# Patient Record
Sex: FEMALE | Race: ASIAN | NOT HISPANIC OR LATINO | Employment: UNEMPLOYED | URBAN - METROPOLITAN AREA
[De-identification: names, ages, dates, MRNs, and addresses within clinical notes are randomized per-mention and may not be internally consistent; named-entity substitution may affect disease eponyms.]

---

## 2022-02-03 ENCOUNTER — OFFICE VISIT (OUTPATIENT)
Dept: FAMILY MEDICINE | Facility: CLINIC | Age: 32
End: 2022-02-03
Payer: COMMERCIAL

## 2022-02-03 VITALS
DIASTOLIC BLOOD PRESSURE: 80 MMHG | OXYGEN SATURATION: 98 % | WEIGHT: 112.88 LBS | HEART RATE: 81 BPM | BODY MASS INDEX: 21.31 KG/M2 | SYSTOLIC BLOOD PRESSURE: 102 MMHG | HEIGHT: 61 IN

## 2022-02-03 DIAGNOSIS — R19.00 PELVIC MASS IN FEMALE: ICD-10-CM

## 2022-02-03 DIAGNOSIS — R10.2 PELVIC PAIN: Primary | ICD-10-CM

## 2022-02-03 PROCEDURE — 1160F RVW MEDS BY RX/DR IN RCRD: CPT | Mod: CPTII,S$GLB,, | Performed by: FAMILY MEDICINE

## 2022-02-03 PROCEDURE — 99204 PR OFFICE/OUTPT VISIT, NEW, LEVL IV, 45-59 MIN: ICD-10-PCS | Mod: S$GLB,,, | Performed by: FAMILY MEDICINE

## 2022-02-03 PROCEDURE — 99999 PR PBB SHADOW E&M-NEW PATIENT-LVL III: ICD-10-PCS | Mod: PBBFAC,,, | Performed by: FAMILY MEDICINE

## 2022-02-03 PROCEDURE — 3008F BODY MASS INDEX DOCD: CPT | Mod: CPTII,S$GLB,, | Performed by: FAMILY MEDICINE

## 2022-02-03 PROCEDURE — 3008F PR BODY MASS INDEX (BMI) DOCUMENTED: ICD-10-PCS | Mod: CPTII,S$GLB,, | Performed by: FAMILY MEDICINE

## 2022-02-03 PROCEDURE — 1159F MED LIST DOCD IN RCRD: CPT | Mod: CPTII,S$GLB,, | Performed by: FAMILY MEDICINE

## 2022-02-03 PROCEDURE — 3074F PR MOST RECENT SYSTOLIC BLOOD PRESSURE < 130 MM HG: ICD-10-PCS | Mod: CPTII,S$GLB,, | Performed by: FAMILY MEDICINE

## 2022-02-03 PROCEDURE — 3079F PR MOST RECENT DIASTOLIC BLOOD PRESSURE 80-89 MM HG: ICD-10-PCS | Mod: CPTII,S$GLB,, | Performed by: FAMILY MEDICINE

## 2022-02-03 PROCEDURE — 1159F PR MEDICATION LIST DOCUMENTED IN MEDICAL RECORD: ICD-10-PCS | Mod: CPTII,S$GLB,, | Performed by: FAMILY MEDICINE

## 2022-02-03 PROCEDURE — 3079F DIAST BP 80-89 MM HG: CPT | Mod: CPTII,S$GLB,, | Performed by: FAMILY MEDICINE

## 2022-02-03 PROCEDURE — 3074F SYST BP LT 130 MM HG: CPT | Mod: CPTII,S$GLB,, | Performed by: FAMILY MEDICINE

## 2022-02-03 PROCEDURE — 99204 OFFICE O/P NEW MOD 45 MIN: CPT | Mod: S$GLB,,, | Performed by: FAMILY MEDICINE

## 2022-02-03 PROCEDURE — 99999 PR PBB SHADOW E&M-NEW PATIENT-LVL III: CPT | Mod: PBBFAC,,, | Performed by: FAMILY MEDICINE

## 2022-02-03 PROCEDURE — 1160F PR REVIEW ALL MEDS BY PRESCRIBER/CLIN PHARMACIST DOCUMENTED: ICD-10-PCS | Mod: CPTII,S$GLB,, | Performed by: FAMILY MEDICINE

## 2022-02-03 NOTE — PROGRESS NOTES
HPI: Pelvic tenderness since 11/2021. No prioe diagnosis of ovarian cyst, but she thinks it is a cyst.  She has a history of endometritis. Periods are normal.  Last GYN exam was 11/2021.            Review of Systems   Constitutional: Negative.    HENT: Negative.    Eyes: Negative.    Respiratory: Negative.    Cardiovascular: Negative.    Gastrointestinal: Negative.    Genitourinary: Negative.    Musculoskeletal: Negative.    Skin: Negative.    Neurological: Negative.    Psychiatric/Behavioral: Negative.         Physical Exam  Constitutional:       Appearance: Normal appearance.   HENT:      Head: Normocephalic and atraumatic.      Nose: Nose normal.      Mouth/Throat:      Mouth: Mucous membranes are dry.      Pharynx: Oropharynx is clear.   Eyes:      Extraocular Movements: Extraocular movements intact.      Pupils: Pupils are equal, round, and reactive to light.   Cardiovascular:      Rate and Rhythm: Normal rate and regular rhythm.      Pulses: Normal pulses.      Heart sounds: Normal heart sounds.   Pulmonary:      Effort: Pulmonary effort is normal.      Breath sounds: Normal breath sounds.   Abdominal:      General: Abdomen is flat. Bowel sounds are normal.      Palpations: Abdomen is soft. There is mass.      Comments: Mobile mass of the pelvis 12 x 12 inches, non-tender.   Musculoskeletal:         General: Normal range of motion.      Cervical back: Normal range of motion and neck supple.   Skin:     General: Skin is warm and dry.   Neurological:      General: No focal deficit present.      Mental Status: She is alert and oriented to person, place, and time.   Psychiatric:         Mood and Affect: Mood normal.         Behavior: Behavior normal.         Thought Content: Thought content normal.         Judgment: Judgment normal.          Pelvic pain  -     US Pelvis Comp with Transvag NON-OB (xpd); Future; Expected date: 02/03/2022    Pelvic mass in female  -     US Pelvis Comp with Transvag NON-OB (xpd);  Future; Expected date: 02/03/2022

## 2022-02-04 ENCOUNTER — TELEPHONE (OUTPATIENT)
Dept: FAMILY MEDICINE | Facility: CLINIC | Age: 32
End: 2022-02-04
Payer: COMMERCIAL

## 2022-02-04 ENCOUNTER — HOSPITAL ENCOUNTER (OUTPATIENT)
Dept: RADIOLOGY | Facility: HOSPITAL | Age: 32
Discharge: HOME | End: 2022-02-04
Attending: FAMILY MEDICINE
Payer: COMMERCIAL

## 2022-02-04 DIAGNOSIS — N83.8 OVARIAN MASS, RIGHT: Primary | ICD-10-CM

## 2022-02-04 DIAGNOSIS — R19.00 PELVIC MASS IN FEMALE: ICD-10-CM

## 2022-02-04 DIAGNOSIS — R10.2 PELVIC PAIN: ICD-10-CM

## 2022-02-04 PROCEDURE — 76856 US PELVIS COMP WITH TRANSVAG NON-OB (XPD): ICD-10-PCS | Mod: 26,,, | Performed by: RADIOLOGY

## 2022-02-04 PROCEDURE — 76856 US EXAM PELVIC COMPLETE: CPT | Mod: 26,,, | Performed by: RADIOLOGY

## 2022-02-04 PROCEDURE — 76830 US PELVIS COMP WITH TRANSVAG NON-OB (XPD): ICD-10-PCS | Mod: 26,,, | Performed by: RADIOLOGY

## 2022-02-04 PROCEDURE — 76830 TRANSVAGINAL US NON-OB: CPT | Mod: TC,PO

## 2022-02-04 PROCEDURE — 76830 TRANSVAGINAL US NON-OB: CPT | Mod: 26,,, | Performed by: RADIOLOGY

## 2022-02-04 NOTE — TELEPHONE ENCOUNTER
----- Message from Sudhir Johnson MD sent at 2/4/2022  1:13 PM CST -----  Please let her know I will place an urgent referral to GYN for the US finding of a large ovarian mass.  They will take it from there.  Thank you!

## 2022-02-04 NOTE — TELEPHONE ENCOUNTER
Called pt and informed.        ----- Message from Sudhir Johnson MD sent at 2/4/2022  1:13 PM CST -----  Please let her know I will place an urgent referral to GYN for the US finding of a large ovarian mass.  They will take it from there.  Thank you!

## 2022-02-10 ENCOUNTER — TELEPHONE (OUTPATIENT)
Dept: OBSTETRICS AND GYNECOLOGY | Facility: CLINIC | Age: 32
End: 2022-02-10
Payer: COMMERCIAL

## 2022-02-10 ENCOUNTER — PATIENT MESSAGE (OUTPATIENT)
Dept: OBSTETRICS AND GYNECOLOGY | Facility: CLINIC | Age: 32
End: 2022-02-10
Payer: COMMERCIAL

## 2022-02-10 NOTE — TELEPHONE ENCOUNTER
Roberto Carlos Elkins,,,got one for you.  She has a large cyst,, prob needs removal  Can you have your peeps get her in with you or someone ???  Thanks, Darshan

## 2022-02-15 ENCOUNTER — OFFICE VISIT (OUTPATIENT)
Dept: OBSTETRICS AND GYNECOLOGY | Facility: CLINIC | Age: 32
End: 2022-02-15
Payer: COMMERCIAL

## 2022-02-15 ENCOUNTER — TELEPHONE (OUTPATIENT)
Dept: HEMATOLOGY/ONCOLOGY | Facility: CLINIC | Age: 32
End: 2022-02-15
Payer: COMMERCIAL

## 2022-02-15 ENCOUNTER — HOSPITAL ENCOUNTER (OUTPATIENT)
Dept: RADIOLOGY | Facility: HOSPITAL | Age: 32
Discharge: HOME | End: 2022-02-15
Attending: OBSTETRICS & GYNECOLOGY
Payer: COMMERCIAL

## 2022-02-15 VITALS
BODY MASS INDEX: 21.52 KG/M2 | SYSTOLIC BLOOD PRESSURE: 102 MMHG | HEIGHT: 61 IN | WEIGHT: 114 LBS | DIASTOLIC BLOOD PRESSURE: 68 MMHG

## 2022-02-15 DIAGNOSIS — R10.9 ABDOMINAL PAIN, UNSPECIFIED ABDOMINAL LOCATION: ICD-10-CM

## 2022-02-15 DIAGNOSIS — N83.202 CYSTS OF BOTH OVARIES: ICD-10-CM

## 2022-02-15 DIAGNOSIS — N83.209 CYST OF OVARY, UNSPECIFIED LATERALITY: Primary | ICD-10-CM

## 2022-02-15 DIAGNOSIS — N83.201 CYSTS OF BOTH OVARIES: ICD-10-CM

## 2022-02-15 PROCEDURE — 74177 CT ABD & PELVIS W/CONTRAST: CPT | Mod: TC,PO

## 2022-02-15 PROCEDURE — 74177 CT ABDOMEN PELVIS WITH CONTRAST: ICD-10-PCS | Mod: 26,,, | Performed by: RADIOLOGY

## 2022-02-15 PROCEDURE — 3078F PR MOST RECENT DIASTOLIC BLOOD PRESSURE < 80 MM HG: ICD-10-PCS | Mod: CPTII,S$GLB,, | Performed by: OBSTETRICS & GYNECOLOGY

## 2022-02-15 PROCEDURE — 1160F PR REVIEW ALL MEDS BY PRESCRIBER/CLIN PHARMACIST DOCUMENTED: ICD-10-PCS | Mod: CPTII,S$GLB,, | Performed by: OBSTETRICS & GYNECOLOGY

## 2022-02-15 PROCEDURE — 99203 PR OFFICE/OUTPT VISIT, NEW, LEVL III, 30-44 MIN: ICD-10-PCS | Mod: S$GLB,,, | Performed by: OBSTETRICS & GYNECOLOGY

## 2022-02-15 PROCEDURE — 99203 OFFICE O/P NEW LOW 30 MIN: CPT | Mod: S$GLB,,, | Performed by: OBSTETRICS & GYNECOLOGY

## 2022-02-15 PROCEDURE — 3008F PR BODY MASS INDEX (BMI) DOCUMENTED: ICD-10-PCS | Mod: CPTII,S$GLB,, | Performed by: OBSTETRICS & GYNECOLOGY

## 2022-02-15 PROCEDURE — 74177 CT ABD & PELVIS W/CONTRAST: CPT | Mod: 26,,, | Performed by: RADIOLOGY

## 2022-02-15 PROCEDURE — 3078F DIAST BP <80 MM HG: CPT | Mod: CPTII,S$GLB,, | Performed by: OBSTETRICS & GYNECOLOGY

## 2022-02-15 PROCEDURE — 25500020 PHARM REV CODE 255: Mod: PO | Performed by: OBSTETRICS & GYNECOLOGY

## 2022-02-15 PROCEDURE — 3074F SYST BP LT 130 MM HG: CPT | Mod: CPTII,S$GLB,, | Performed by: OBSTETRICS & GYNECOLOGY

## 2022-02-15 PROCEDURE — 1159F PR MEDICATION LIST DOCUMENTED IN MEDICAL RECORD: ICD-10-PCS | Mod: CPTII,S$GLB,, | Performed by: OBSTETRICS & GYNECOLOGY

## 2022-02-15 PROCEDURE — 3008F BODY MASS INDEX DOCD: CPT | Mod: CPTII,S$GLB,, | Performed by: OBSTETRICS & GYNECOLOGY

## 2022-02-15 PROCEDURE — A9698 NON-RAD CONTRAST MATERIALNOC: HCPCS | Mod: PO | Performed by: OBSTETRICS & GYNECOLOGY

## 2022-02-15 PROCEDURE — 1159F MED LIST DOCD IN RCRD: CPT | Mod: CPTII,S$GLB,, | Performed by: OBSTETRICS & GYNECOLOGY

## 2022-02-15 PROCEDURE — 99999 PR PBB SHADOW E&M-EST. PATIENT-LVL III: ICD-10-PCS | Mod: PBBFAC,,, | Performed by: OBSTETRICS & GYNECOLOGY

## 2022-02-15 PROCEDURE — 3074F PR MOST RECENT SYSTOLIC BLOOD PRESSURE < 130 MM HG: ICD-10-PCS | Mod: CPTII,S$GLB,, | Performed by: OBSTETRICS & GYNECOLOGY

## 2022-02-15 PROCEDURE — 1160F RVW MEDS BY RX/DR IN RCRD: CPT | Mod: CPTII,S$GLB,, | Performed by: OBSTETRICS & GYNECOLOGY

## 2022-02-15 PROCEDURE — 99999 PR PBB SHADOW E&M-EST. PATIENT-LVL III: CPT | Mod: PBBFAC,,, | Performed by: OBSTETRICS & GYNECOLOGY

## 2022-02-15 RX ADMIN — IOHEXOL 75 ML: 350 INJECTION, SOLUTION INTRAVENOUS at 02:02

## 2022-02-15 RX ADMIN — IOHEXOL 1000 ML: 9 SOLUTION ORAL at 02:02

## 2022-02-15 NOTE — NURSING
Spoke to pt.  Scheduled pt for Dr. Taylor's multi d clinic tomorrow.  Megan Espinoza RN at Dr. Taylor's office notified.

## 2022-02-15 NOTE — PROGRESS NOTES
No chief complaint on file.      History of Present Illness   31 y.o. F patient presents today for consult for an ovarian cyst.     In Nov, felt nodule on her abdomen. She could feel it getting bigger.   Recently, mass is causing pressure and pelvic pain. Denies severe pain at this time.   Cycles became abnormal also.  Usually cycles are monthly, lasting 4 days, normal flow  TVUS noted large ovarian cyst  Contraception: none  No Fam Hx cancer  Sexually active  Last pap: Nov normal  Denies h/o abnormal pap    2/4/22 US FINDINGS:  Uterus: The uterus measures 9 x 5.5 x 6.3 cm in dimension.  No uterine masses appreciated.  There is a normal homogeneous uterine parenchymal echotexture.  The endometrial stripe measures 5 mm, normal for a premenopausal patient.  No fluid/blood products within the endometrial canal.  Ovaries: The right ovary is markedly enlarged measuring 15.7 x 7.9 x 11.1 cm.  The left ovary is not visualized.  The right ovary appears nearly completely replaced by a large complex multi-septated cystic mass which shows areas with low level internal echoes.  No associated calcifications.  Benign and malignant ovarian neoplasms are possible.  Consider additional characterization of the right ovary with contrast enhanced MRI of the female pelvis.  There is normal arterial and venous color flow present in the right ovary.  Adnexal soft tissues: No free fluid in the cul de sac.  No solid or cystic adnexal masses.  Impression:  1. 15.7 x 7.9 x 11.1 cm complex multi-septated cystic right ovarian mass which essentially replaces the entire right ovary.  There is normal arterial and venous color flow present within the right ovary.  Consider additional characterization with contrast enhanced MRI of the female pelvis.  Consideration should be given to surgical removal given the large size (which places the patient at increased risk of developing ovarian torsion).  2. Nonvisualization of the left ovary.  This report  "was flagged in Epic as abnormal.    Past medical and surgical history reviewed.   I have reviewed the patient's medical history in detail and updated the computerized patient record.  Review of patient's allergies indicates:  No Known Allergies    Review of Systems  Constitutional: negative for chills and fatigue  Gastrointestinal: negative for constipation, diarrhea, nausea and vomiting  Genitourinary:negative forgenital lesions and vaginal discharge, dysuria, and hematuria  Admits to urinary frequency    Physical Examination:  /68   Ht 5' 1" (1.549 m)   Wt 51.7 kg (113 lb 15.7 oz)   LMP 01/20/2022   BMI 21.54 kg/m²    Physical Exam:   Constitutional: She appears well-developed and well-nourished. No distress.             Abdominal: Soft. She exhibits distension and mass.                     Assessment/Plan:  Cyst of ovary, unspecified laterality  -     AFP Tumor Marker; Future; Expected date: 02/15/2022  -     ; Future; Expected date: 02/15/2022  -     HCG, Quantitative; Future; Expected date: 02/15/2022  -     Inhibin; Future; Expected date: 02/15/2022  -     Inhibin B; Future; Expected date: 02/15/2022  -     Lactate Dehydrogenase; Future; Expected date: 02/15/2022  -     Risk of Ovarian Malignancy Algorithm, DONNY; Future; Expected date: 02/15/2022  -     CT Abdomen Pelvis With Contrast; Future; Expected date: 02/15/2022    Abdominal pain, unspecified abdominal location  -     CT Abdomen Pelvis With Contrast; Future; Expected date: 02/15/2022      Work up ordered for a large ovarian cyst. Recommenced surgical removal by Gyn or GynOnc pending results.     "

## 2022-02-16 ENCOUNTER — TELEPHONE (OUTPATIENT)
Dept: HEMATOLOGY/ONCOLOGY | Facility: CLINIC | Age: 32
End: 2022-02-16
Payer: COMMERCIAL

## 2022-02-16 ENCOUNTER — OFFICE VISIT (OUTPATIENT)
Dept: GYNECOLOGIC ONCOLOGY | Facility: CLINIC | Age: 32
End: 2022-02-16
Payer: COMMERCIAL

## 2022-02-16 VITALS
SYSTOLIC BLOOD PRESSURE: 107 MMHG | HEIGHT: 61 IN | HEART RATE: 77 BPM | BODY MASS INDEX: 21.02 KG/M2 | RESPIRATION RATE: 16 BRPM | WEIGHT: 111.31 LBS | OXYGEN SATURATION: 98 % | DIASTOLIC BLOOD PRESSURE: 74 MMHG

## 2022-02-16 DIAGNOSIS — R10.9 ABDOMINAL PAIN, UNSPECIFIED ABDOMINAL LOCATION: ICD-10-CM

## 2022-02-16 DIAGNOSIS — N83.209 CYST OF OVARY, UNSPECIFIED LATERALITY: ICD-10-CM

## 2022-02-16 PROCEDURE — 1159F MED LIST DOCD IN RCRD: CPT | Mod: CPTII,S$GLB,, | Performed by: OBSTETRICS & GYNECOLOGY

## 2022-02-16 PROCEDURE — 99204 PR OFFICE/OUTPT VISIT, NEW, LEVL IV, 45-59 MIN: ICD-10-PCS | Mod: S$GLB,,, | Performed by: OBSTETRICS & GYNECOLOGY

## 2022-02-16 PROCEDURE — 3008F BODY MASS INDEX DOCD: CPT | Mod: CPTII,S$GLB,, | Performed by: OBSTETRICS & GYNECOLOGY

## 2022-02-16 PROCEDURE — 3008F PR BODY MASS INDEX (BMI) DOCUMENTED: ICD-10-PCS | Mod: CPTII,S$GLB,, | Performed by: OBSTETRICS & GYNECOLOGY

## 2022-02-16 PROCEDURE — 1159F PR MEDICATION LIST DOCUMENTED IN MEDICAL RECORD: ICD-10-PCS | Mod: CPTII,S$GLB,, | Performed by: OBSTETRICS & GYNECOLOGY

## 2022-02-16 PROCEDURE — 99999 PR PBB SHADOW E&M-EST. PATIENT-LVL III: CPT | Mod: PBBFAC,,, | Performed by: OBSTETRICS & GYNECOLOGY

## 2022-02-16 PROCEDURE — 99204 OFFICE O/P NEW MOD 45 MIN: CPT | Mod: S$GLB,,, | Performed by: OBSTETRICS & GYNECOLOGY

## 2022-02-16 PROCEDURE — 3078F PR MOST RECENT DIASTOLIC BLOOD PRESSURE < 80 MM HG: ICD-10-PCS | Mod: CPTII,S$GLB,, | Performed by: OBSTETRICS & GYNECOLOGY

## 2022-02-16 PROCEDURE — 3074F SYST BP LT 130 MM HG: CPT | Mod: CPTII,S$GLB,, | Performed by: OBSTETRICS & GYNECOLOGY

## 2022-02-16 PROCEDURE — 99999 PR PBB SHADOW E&M-EST. PATIENT-LVL III: ICD-10-PCS | Mod: PBBFAC,,, | Performed by: OBSTETRICS & GYNECOLOGY

## 2022-02-16 PROCEDURE — 3074F PR MOST RECENT SYSTOLIC BLOOD PRESSURE < 130 MM HG: ICD-10-PCS | Mod: CPTII,S$GLB,, | Performed by: OBSTETRICS & GYNECOLOGY

## 2022-02-16 PROCEDURE — 3078F DIAST BP <80 MM HG: CPT | Mod: CPTII,S$GLB,, | Performed by: OBSTETRICS & GYNECOLOGY

## 2022-02-16 NOTE — NURSING
Patient seen in multi-disciplinary clinic today.  Dr. Taylor's office will contact patient for surgery date.

## 2022-02-16 NOTE — PROGRESS NOTES
Subjective:      Chief Complaint: Consult (Pelvic mass )       Patient ID: Francis Nagel is a 31 y.o. female,  2 para 2 referred by Dr. Michelle Ramirez for evaluation of   a large complex cystic pelvic mass.  The patient states she was seen in November by gyn because of pelvic discomfort and was diagnosed with a urinary tract infection.  At the time of her exam she was noted to have a possible pelvic mass with plan for antibiotics and a follow-up ultrasound.  Because of recent increase in her pelvic discomfort and the development of a large palpable mass, the patient presented to Dr. Michelle Ramirez.  A pelvic ultrasound revealed a normal uterus with a markedly enlarged ovary measuring 15.7 x 7.9 x 11.1 cm.  The mass is described as a multi septated cystic mass with no associated solid areas or calcifications.  A CT scan was then performed which confirmed a large complex pelvic mass measuring 16.7 x 14 x 12.8 cm with multiple cystic areas and other areas with prominent vascularity.  The CT scan also showed bilateral early hydronephrosis but no adenopathy or other evidence of malignancy.  The patient's  is minimally elevated at 42 and other tumor markers are all normal.  A long discussion was held with the patient and her mother-in-law and they want to proceed with surgery as soon as possible.  The patient is strongly desirous of a minimally invasive surgical approach if at all possible.  We discussed the reason for trying to remove the mass intact and the possible risk of malignancy.    Past medical history  Illnesses:  None.    Surgeries:  Vaginal delivery x2, the 2nd with postpartum infection requiring hospitalization.          No past medical history on file.   No past surgical history on file.   Family History   Problem Relation Age of Onset    Breast cancer Neg Hx     Colon cancer Neg Hx     Ovarian cancer Neg Hx       Social History     Tobacco Use    Smoking status: Never Smoker    Smokeless  tobacco: Never Used   Substance Use Topics    Alcohol use: Yes     Comment: twice a month    Drug use: Never              Objective:        1. General: Well appearing, no apparent distress, alert and oriented.  2. Lymph: Neck symmetric without cervical or supraclavicular adenopathy or mass.  3. Heart:  Regular rate and rhythm.  4. Lungs: Normal respiratory rate, no accessory muscle use.  5. Psych: Normal affect.  6. Abdomen:  The lower abdomen is distended with a large mass up to her umbilicus.   7. Skin: Warm, dry, no rashes or lesions.   8. Extremities: Bilateral lower extremities without edema or tenderness.  9. Genitourinary               Pelvic Examination is deferred to the OR.                 Assessment/Plan     Large multiloculated cystic abdominal pelvic mass.  Will plan attempted Robotic oophorectomy with frozen section and hyst, BSO and staging as appropriate if a malignancy is documented.  The patient is strongly desirous of MIS but is agreeable to an ex lap if needed.  She does not want fertility preservation.          Cyst of ovary, unspecified laterality  -     Ambulatory referral/consult to Gynecologic Oncology    Abdominal pain, unspecified abdominal location  -     Ambulatory referral/consult to Gynecologic Oncology

## 2022-02-19 NOTE — PROGRESS NOTES
Subjective:      Patient ID: Francis Nagel is a 31 y.o. female.    Chief Complaint: Ovarian Cyst      HPI  Self referred for a second opinion regarding pelvic mass and elevated .     She has previously seen Dr. Rupa Taylor and was referred to Dr. Taylor by Dr. Michelle Ramirez.     Pelvic US 2022  Uterus: The uterus measures 9 x 5.5 x 6.3 cm in dimension.  No uterine masses appreciated.  There is a normal homogeneous uterine parenchymal echotexture.  The endometrial stripe measures 5 mm, normal for a premenopausal patient.  No fluid/blood products within the endometrial canal.     Ovaries: The right ovary is markedly enlarged measuring 15.7 x 7.9 x 11.1 cm.  The left ovary is not visualized.  The right ovary appears nearly completely replaced by a large complex multi-septated cystic mass which shows areas with low level internal echoes.  No associated calcifications.  Benign and malignant ovarian neoplasms are possible.  Consider additional characterization of the right ovary with contrast enhanced MRI of the female pelvis.  There is normal arterial and venous color flow present in the right ovary.    CT A&P 2/15/2022  - there is a large complex pelvic mass which is centered above and above and left lateral to the uterus.  This measures 16.7 cm craniocaudal by 14 point 8 cm transversely by 12.8 cm AP.  This contains multiple cystic areas and demonstrates in prominent enhancement and vascularity in other areas.  The uterus is anteverted and compressed.  Whether this arises from the right or left ovary is uncertain.  There is early bilateral hydronephrosis.  - of note there is no ascites, omental caking or peritoneal implants    DONNY 0.53 (low risk in a premenopausal patient)    Tumor markers:    Inhibin A 10  Inhibin B 46  hcg <2.4   42 (elevated)  AFP 2.7    No prior abdominal surgeries.  x 2. Does not desire future fertility.     Denies family history of breast, ovarian, uterine or colon cancer.      Endorses some occasional pelvic discomforts. Her  was available via speaker phone during our visit.      Review of Systems   Constitutional: Negative for appetite change, chills, fatigue and fever.   HENT: Negative for mouth sores.    Respiratory: Negative for cough and shortness of breath.    Cardiovascular: Negative for leg swelling.   Gastrointestinal: Negative for abdominal pain, blood in stool, constipation and diarrhea.   Endocrine: Negative for cold intolerance.   Genitourinary: Negative for dysuria and vaginal bleeding.   Musculoskeletal: Negative for myalgias.   Skin: Negative for rash.   Allergic/Immunologic: Negative.    Neurological: Negative for weakness and numbness.   Hematological: Negative for adenopathy. Does not bruise/bleed easily.   Psychiatric/Behavioral: Negative for confusion.       History reviewed. No pertinent past medical history.  History reviewed. No pertinent surgical history.  Family History   Problem Relation Age of Onset    Breast cancer Neg Hx     Colon cancer Neg Hx     Ovarian cancer Neg Hx      Social History     Socioeconomic History    Marital status:    Tobacco Use    Smoking status: Never Smoker    Smokeless tobacco: Never Used   Substance and Sexual Activity    Alcohol use: Yes     Comment: twice a month    Drug use: Never    Sexual activity: Yes     No current outpatient medications on file.     No current facility-administered medications for this visit.     Review of patient's allergies indicates:  No Known Allergies    Objective:   Physical Exam:   Constitutional: She is oriented to person, place, and time. She appears well-developed and well-nourished.    HENT:   Head: Normocephalic and atraumatic.    Eyes: Pupils are equal, round, and reactive to light. EOM are normal.    Neck: No thyromegaly present.    Cardiovascular: Normal rate, regular rhythm and intact distal pulses.     Pulmonary/Chest: Effort normal and breath sounds normal. No  respiratory distress. She has no wheezes.        Abdominal: Soft. Bowel sounds are normal. She exhibits no distension and no mass. There is no abdominal tenderness.             Musculoskeletal: Normal range of motion and moves all extremeties.      Lymphadenopathy:     She has no cervical adenopathy.        Right: No supraclavicular adenopathy present.        Left: No supraclavicular adenopathy present.    Neurological: She is alert and oriented to person, place, and time.    Skin: Skin is warm and dry. No rash noted.    Psychiatric: She has a normal mood and affect.       Assessment:     1. Pelvic mass    2. Elevated CA-125        Plan:   No orders of the defined types were placed in this encounter.      We discussed the differential diagnosis for pelvic mass in a premenopausal woman including benign, borderline and malignant etiologies. Given the size and complexity of the lesion I have recommended surgical removal. DONNY is low risk and  is minimally elevated. However she is aware that definitive diagnosis can only be made with surgical excision. I had a lengthy discussion with her and her  regarding surgical approach including minimally invasive versus open laparotomy. We discussed surgical spillage and oncologic implications. She desires minimally invasive approach. We discussed the use of intraoperative frozen section, it's benefits and limitations. At this time she would like to proceed with USO. Should a malignancy be found of final pathology we discussed the potential need for a second surgery. They were allowed to ask questions and all are in agreement     The risks, benefits, and indications of the procedure were discussed with the patient and her family members if present.  These included bleeding, transfusion, infection, damage to surrounding tissues (bowel, bladder, ureter), wound separation, lymphedema, conversion to laparotomy if laparoscopic, perioperative cardiac events, VTE, pneumonia,  and possible death.  We discussed possible need for bowel or urologic resection, temporary or permanent ostomies. She voiced understanding, all questions were answered and consents were signed.    Robotic USO 3/10/2022 [facility].     I spent approximately 45 minutes reviewing the available records and evaluating the patient, out of which over 50% of the time was spent face to face with the patient in counseling and coordinating this patient's care.

## 2022-02-19 NOTE — H&P (VIEW-ONLY)
Subjective:      Patient ID: Francis Nagel is a 31 y.o. female.    Chief Complaint: Ovarian Cyst      HPI  Self referred for a second opinion regarding pelvic mass and elevated .     She has previously seen Dr. Rupa Taylor and was referred to Dr. Taylor by Dr. Michelle Ramirez.     Pelvic US 2022  Uterus: The uterus measures 9 x 5.5 x 6.3 cm in dimension.  No uterine masses appreciated.  There is a normal homogeneous uterine parenchymal echotexture.  The endometrial stripe measures 5 mm, normal for a premenopausal patient.  No fluid/blood products within the endometrial canal.     Ovaries: The right ovary is markedly enlarged measuring 15.7 x 7.9 x 11.1 cm.  The left ovary is not visualized.  The right ovary appears nearly completely replaced by a large complex multi-septated cystic mass which shows areas with low level internal echoes.  No associated calcifications.  Benign and malignant ovarian neoplasms are possible.  Consider additional characterization of the right ovary with contrast enhanced MRI of the female pelvis.  There is normal arterial and venous color flow present in the right ovary.    CT A&P 2/15/2022  - there is a large complex pelvic mass which is centered above and above and left lateral to the uterus.  This measures 16.7 cm craniocaudal by 14 point 8 cm transversely by 12.8 cm AP.  This contains multiple cystic areas and demonstrates in prominent enhancement and vascularity in other areas.  The uterus is anteverted and compressed.  Whether this arises from the right or left ovary is uncertain.  There is early bilateral hydronephrosis.  - of note there is no ascites, omental caking or peritoneal implants    DONNY 0.53 (low risk in a premenopausal patient)    Tumor markers:    Inhibin A 10  Inhibin B 46  hcg <2.4   42 (elevated)  AFP 2.7    No prior abdominal surgeries.  x 2. Does not desire future fertility.     Denies family history of breast, ovarian, uterine or colon cancer.      Endorses some occasional pelvic discomforts. Her  was available via speaker phone during our visit.      Review of Systems   Constitutional: Negative for appetite change, chills, fatigue and fever.   HENT: Negative for mouth sores.    Respiratory: Negative for cough and shortness of breath.    Cardiovascular: Negative for leg swelling.   Gastrointestinal: Negative for abdominal pain, blood in stool, constipation and diarrhea.   Endocrine: Negative for cold intolerance.   Genitourinary: Negative for dysuria and vaginal bleeding.   Musculoskeletal: Negative for myalgias.   Skin: Negative for rash.   Allergic/Immunologic: Negative.    Neurological: Negative for weakness and numbness.   Hematological: Negative for adenopathy. Does not bruise/bleed easily.   Psychiatric/Behavioral: Negative for confusion.       History reviewed. No pertinent past medical history.  History reviewed. No pertinent surgical history.  Family History   Problem Relation Age of Onset    Breast cancer Neg Hx     Colon cancer Neg Hx     Ovarian cancer Neg Hx      Social History     Socioeconomic History    Marital status:    Tobacco Use    Smoking status: Never Smoker    Smokeless tobacco: Never Used   Substance and Sexual Activity    Alcohol use: Yes     Comment: twice a month    Drug use: Never    Sexual activity: Yes     No current outpatient medications on file.     No current facility-administered medications for this visit.     Review of patient's allergies indicates:  No Known Allergies    Objective:   Physical Exam:   Constitutional: She is oriented to person, place, and time. She appears well-developed and well-nourished.    HENT:   Head: Normocephalic and atraumatic.    Eyes: Pupils are equal, round, and reactive to light. EOM are normal.    Neck: No thyromegaly present.    Cardiovascular: Normal rate, regular rhythm and intact distal pulses.     Pulmonary/Chest: Effort normal and breath sounds normal. No  respiratory distress. She has no wheezes.        Abdominal: Soft. Bowel sounds are normal. She exhibits no distension and no mass. There is no abdominal tenderness.             Musculoskeletal: Normal range of motion and moves all extremeties.      Lymphadenopathy:     She has no cervical adenopathy.        Right: No supraclavicular adenopathy present.        Left: No supraclavicular adenopathy present.    Neurological: She is alert and oriented to person, place, and time.    Skin: Skin is warm and dry. No rash noted.    Psychiatric: She has a normal mood and affect.       Assessment:     1. Pelvic mass    2. Elevated CA-125        Plan:   No orders of the defined types were placed in this encounter.      We discussed the differential diagnosis for pelvic mass in a premenopausal woman including benign, borderline and malignant etiologies. Given the size and complexity of the lesion I have recommended surgical removal. DONNY is low risk and  is minimally elevated. However she is aware that definitive diagnosis can only be made with surgical excision. I had a lengthy discussion with her and her  regarding surgical approach including minimally invasive versus open laparotomy. We discussed surgical spillage and oncologic implications. She desires minimally invasive approach. We discussed the use of intraoperative frozen section, it's benefits and limitations. At this time she would like to proceed with USO. Should a malignancy be found of final pathology we discussed the potential need for a second surgery. They were allowed to ask questions and all are in agreement     The risks, benefits, and indications of the procedure were discussed with the patient and her family members if present.  These included bleeding, transfusion, infection, damage to surrounding tissues (bowel, bladder, ureter), wound separation, lymphedema, conversion to laparotomy if laparoscopic, perioperative cardiac events, VTE, pneumonia,  and possible death.  We discussed possible need for bowel or urologic resection, temporary or permanent ostomies. She voiced understanding, all questions were answered and consents were signed.    Robotic USO 3/10/2022 [facility].     I spent approximately 45 minutes reviewing the available records and evaluating the patient, out of which over 50% of the time was spent face to face with the patient in counseling and coordinating this patient's care.

## 2022-02-21 ENCOUNTER — TELEPHONE (OUTPATIENT)
Dept: GYNECOLOGIC ONCOLOGY | Facility: CLINIC | Age: 32
End: 2022-02-21
Payer: COMMERCIAL

## 2022-02-21 ENCOUNTER — OFFICE VISIT (OUTPATIENT)
Dept: GYNECOLOGIC ONCOLOGY | Facility: CLINIC | Age: 32
End: 2022-02-21
Payer: COMMERCIAL

## 2022-02-21 ENCOUNTER — PATIENT MESSAGE (OUTPATIENT)
Dept: GYNECOLOGIC ONCOLOGY | Facility: CLINIC | Age: 32
End: 2022-02-21
Payer: COMMERCIAL

## 2022-02-21 VITALS
SYSTOLIC BLOOD PRESSURE: 120 MMHG | WEIGHT: 112 LBS | HEART RATE: 88 BPM | BODY MASS INDEX: 21.16 KG/M2 | DIASTOLIC BLOOD PRESSURE: 76 MMHG

## 2022-02-21 DIAGNOSIS — R10.9 ABDOMINAL PAIN, UNSPECIFIED ABDOMINAL LOCATION: ICD-10-CM

## 2022-02-21 DIAGNOSIS — R19.00 PELVIC MASS: ICD-10-CM

## 2022-02-21 DIAGNOSIS — R97.1 ELEVATED CA-125: ICD-10-CM

## 2022-02-21 DIAGNOSIS — N83.209 CYST OF OVARY, UNSPECIFIED LATERALITY: Primary | ICD-10-CM

## 2022-02-21 PROCEDURE — 99204 OFFICE O/P NEW MOD 45 MIN: CPT | Mod: S$GLB,,, | Performed by: OBSTETRICS & GYNECOLOGY

## 2022-02-21 PROCEDURE — 3008F PR BODY MASS INDEX (BMI) DOCUMENTED: ICD-10-PCS | Mod: CPTII,S$GLB,, | Performed by: OBSTETRICS & GYNECOLOGY

## 2022-02-21 PROCEDURE — 1160F PR REVIEW ALL MEDS BY PRESCRIBER/CLIN PHARMACIST DOCUMENTED: ICD-10-PCS | Mod: CPTII,S$GLB,, | Performed by: OBSTETRICS & GYNECOLOGY

## 2022-02-21 PROCEDURE — 99999 PR PBB SHADOW E&M-EST. PATIENT-LVL III: CPT | Mod: PBBFAC,,, | Performed by: OBSTETRICS & GYNECOLOGY

## 2022-02-21 PROCEDURE — 1160F RVW MEDS BY RX/DR IN RCRD: CPT | Mod: CPTII,S$GLB,, | Performed by: OBSTETRICS & GYNECOLOGY

## 2022-02-21 PROCEDURE — 99204 PR OFFICE/OUTPT VISIT, NEW, LEVL IV, 45-59 MIN: ICD-10-PCS | Mod: S$GLB,,, | Performed by: OBSTETRICS & GYNECOLOGY

## 2022-02-21 PROCEDURE — 3078F PR MOST RECENT DIASTOLIC BLOOD PRESSURE < 80 MM HG: ICD-10-PCS | Mod: CPTII,S$GLB,, | Performed by: OBSTETRICS & GYNECOLOGY

## 2022-02-21 PROCEDURE — 3074F SYST BP LT 130 MM HG: CPT | Mod: CPTII,S$GLB,, | Performed by: OBSTETRICS & GYNECOLOGY

## 2022-02-21 PROCEDURE — 99999 PR PBB SHADOW E&M-EST. PATIENT-LVL III: ICD-10-PCS | Mod: PBBFAC,,, | Performed by: OBSTETRICS & GYNECOLOGY

## 2022-02-21 PROCEDURE — 1159F PR MEDICATION LIST DOCUMENTED IN MEDICAL RECORD: ICD-10-PCS | Mod: CPTII,S$GLB,, | Performed by: OBSTETRICS & GYNECOLOGY

## 2022-02-21 PROCEDURE — 3008F BODY MASS INDEX DOCD: CPT | Mod: CPTII,S$GLB,, | Performed by: OBSTETRICS & GYNECOLOGY

## 2022-02-21 PROCEDURE — 3078F DIAST BP <80 MM HG: CPT | Mod: CPTII,S$GLB,, | Performed by: OBSTETRICS & GYNECOLOGY

## 2022-02-21 PROCEDURE — 3074F PR MOST RECENT SYSTOLIC BLOOD PRESSURE < 130 MM HG: ICD-10-PCS | Mod: CPTII,S$GLB,, | Performed by: OBSTETRICS & GYNECOLOGY

## 2022-02-21 PROCEDURE — 1159F MED LIST DOCD IN RCRD: CPT | Mod: CPTII,S$GLB,, | Performed by: OBSTETRICS & GYNECOLOGY

## 2022-02-21 RX ORDER — CEFAZOLIN SODIUM/D5W 2 G/100 ML
2 PLASTIC BAG, INJECTION (ML) INTRAVENOUS
Status: CANCELLED | OUTPATIENT
Start: 2022-02-21

## 2022-02-21 RX ORDER — MUPIROCIN 20 MG/G
OINTMENT TOPICAL
Status: CANCELLED | OUTPATIENT
Start: 2022-02-21

## 2022-02-21 RX ORDER — SODIUM CHLORIDE 9 MG/ML
INJECTION, SOLUTION INTRAVENOUS CONTINUOUS
Status: CANCELLED | OUTPATIENT
Start: 2022-02-21

## 2022-02-21 RX ORDER — LIDOCAINE HYDROCHLORIDE 10 MG/ML
1 INJECTION, SOLUTION EPIDURAL; INFILTRATION; INTRACAUDAL; PERINEURAL ONCE
Status: CANCELLED | OUTPATIENT
Start: 2022-02-21 | End: 2022-02-21

## 2022-02-21 NOTE — LETTER
February 21, 2022        Michelle Ramirez MD  [address]  [facility] [address]             [facility] 2nd [facility]  [address]  [place] [address]  Phone: [phone]   Patient: Francis Nagel   MR Number: [id number]   YOB: 1990   Date of Visit: 2/21/2022     Dear Dr. Ramirez,     Please find attached progress note.     Sincerely,      Charmaine Delacruz MD            CC  No Recipients    Enclosure

## 2022-02-23 NOTE — ANESTHESIA PAT ROS NOTE
02/23/2022  Francis Nagel is a 31 y.o., female.      Pre-op Assessment          Review of Systems         Anesthesia Assessment: Preoperative EQUATION    Planned Procedure: Procedure(s) (LRB):  XI ROBOTIC SALPINGO-OOPHORECTOMY/ USO (N/A)  Requested Anesthesia Type:General  Surgeon: Charmaine Delacruz MD  Service: OB/GYN  Known or anticipated Date of Surgery:3/10/2022    Surgeon notes: reviewed    Previous anesthesia records:Not available    Last PCP note: within 3 months , within [facility]   Subspecialty notes: Gyn/ONC    Other important co-morbidities:Per Epic: Ovarian cyst      Tests already available:  No recent tests.      Optimization:  Anesthesia Preop Clinic Assessment  Indicated.    Medical Opinion Indicated.          Plan:    Testing:  CMP, Hematology Profile and T&S   Pre-anesthesia  visit       Visit focus: concerns in complex and/or prolonged anesthesia, position other than supine     Consultation:IM Perioperative Hospitalist     Patient  has previously scheduled Medical Appointment: N/A    Navigation: Tests Scheduled.              Consults scheduled.             Results will be tracked by Preop Clinic.

## 2022-02-24 ENCOUNTER — TELEPHONE (OUTPATIENT)
Dept: PREADMISSION TESTING | Facility: HOSPITAL | Age: 32
End: 2022-02-24
Payer: COMMERCIAL

## 2022-02-24 NOTE — TELEPHONE ENCOUNTER
----- Message from Catherine Leonard RN sent at 2/23/2022  3:02 PM CST -----  Surgery date: 3/10/2022  PreOp appt: N/A    Please call Pt and schedule the following preop appts:    NP  Lab    Thank you!  Catherine

## 2022-03-04 ENCOUNTER — OFFICE VISIT (OUTPATIENT)
Dept: INTERNAL MEDICINE | Facility: CLINIC | Age: 32
End: 2022-03-04
Payer: COMMERCIAL

## 2022-03-04 ENCOUNTER — LAB VISIT (OUTPATIENT)
Dept: LAB | Facility: HOSPITAL | Age: 32
End: 2022-03-04
Attending: ANESTHESIOLOGY
Payer: COMMERCIAL

## 2022-03-04 VITALS
HEIGHT: 61 IN | BODY MASS INDEX: 21.52 KG/M2 | WEIGHT: 114 LBS | DIASTOLIC BLOOD PRESSURE: 76 MMHG | OXYGEN SATURATION: 99 % | TEMPERATURE: 98 F | RESPIRATION RATE: 16 BRPM | HEART RATE: 93 BPM | SYSTOLIC BLOOD PRESSURE: 113 MMHG

## 2022-03-04 DIAGNOSIS — Z01.818 PREOPERATIVE TESTING: ICD-10-CM

## 2022-03-04 DIAGNOSIS — E87.1 HYPONATREMIA: ICD-10-CM

## 2022-03-04 LAB
ABO + RH BLD: NORMAL
ALBUMIN SERPL BCP-MCNC: 3.9 G/DL (ref 3.5–5.2)
ALP SERPL-CCNC: 44 U/L (ref 55–135)
ALT SERPL W/O P-5'-P-CCNC: 11 U/L (ref 10–44)
ANION GAP SERPL CALC-SCNC: 6 MMOL/L (ref 8–16)
AST SERPL-CCNC: 14 U/L (ref 10–40)
BILIRUB SERPL-MCNC: 0.6 MG/DL (ref 0.1–1)
BLD GP AB SCN CELLS X3 SERPL QL: NORMAL
BUN SERPL-MCNC: 6 MG/DL (ref 6–20)
CALCIUM SERPL-MCNC: 10 MG/DL (ref 8.7–10.5)
CHLORIDE SERPL-SCNC: 102 MMOL/L (ref 95–110)
CO2 SERPL-SCNC: 27 MMOL/L (ref 23–29)
CREAT SERPL-MCNC: 0.6 MG/DL (ref 0.5–1.4)
ERYTHROCYTE [DISTWIDTH] IN BLOOD BY AUTOMATED COUNT: 11 % (ref 11.5–14.5)
EST. GFR  (AFRICAN AMERICAN): >60 ML/MIN/1.73 M^2
EST. GFR  (NON AFRICAN AMERICAN): >60 ML/MIN/1.73 M^2
GLUCOSE SERPL-MCNC: 72 MG/DL (ref 70–110)
HCT VFR BLD AUTO: 39.2 % (ref 37–48.5)
HGB BLD-MCNC: 12.4 G/DL (ref 12–16)
MCH RBC QN AUTO: 30.4 PG (ref 27–31)
MCHC RBC AUTO-ENTMCNC: 31.6 G/DL (ref 32–36)
MCV RBC AUTO: 96 FL (ref 82–98)
PLATELET # BLD AUTO: 304 K/UL (ref 150–450)
PMV BLD AUTO: 9 FL (ref 9.2–12.9)
POTASSIUM SERPL-SCNC: 3.8 MMOL/L (ref 3.5–5.1)
PROT SERPL-MCNC: 8.1 G/DL (ref 6–8.4)
RBC # BLD AUTO: 4.08 M/UL (ref 4–5.4)
SODIUM SERPL-SCNC: 135 MMOL/L (ref 136–145)
WBC # BLD AUTO: 5.18 K/UL (ref 3.9–12.7)

## 2022-03-04 PROCEDURE — 1160F PR REVIEW ALL MEDS BY PRESCRIBER/CLIN PHARMACIST DOCUMENTED: ICD-10-PCS | Mod: CPTII,S$GLB,, | Performed by: NURSE PRACTITIONER

## 2022-03-04 PROCEDURE — 3078F DIAST BP <80 MM HG: CPT | Mod: CPTII,S$GLB,, | Performed by: NURSE PRACTITIONER

## 2022-03-04 PROCEDURE — 99214 PR OFFICE/OUTPT VISIT, EST, LEVL IV, 30-39 MIN: ICD-10-PCS | Mod: S$GLB,,, | Performed by: NURSE PRACTITIONER

## 2022-03-04 PROCEDURE — 3008F PR BODY MASS INDEX (BMI) DOCUMENTED: ICD-10-PCS | Mod: CPTII,S$GLB,, | Performed by: NURSE PRACTITIONER

## 2022-03-04 PROCEDURE — 3074F SYST BP LT 130 MM HG: CPT | Mod: CPTII,S$GLB,, | Performed by: NURSE PRACTITIONER

## 2022-03-04 PROCEDURE — 85027 COMPLETE CBC AUTOMATED: CPT | Performed by: ANESTHESIOLOGY

## 2022-03-04 PROCEDURE — 86900 BLOOD TYPING SEROLOGIC ABO: CPT | Performed by: ANESTHESIOLOGY

## 2022-03-04 PROCEDURE — 36415 COLL VENOUS BLD VENIPUNCTURE: CPT | Performed by: ANESTHESIOLOGY

## 2022-03-04 PROCEDURE — 1159F PR MEDICATION LIST DOCUMENTED IN MEDICAL RECORD: ICD-10-PCS | Mod: CPTII,S$GLB,, | Performed by: NURSE PRACTITIONER

## 2022-03-04 PROCEDURE — 3078F PR MOST RECENT DIASTOLIC BLOOD PRESSURE < 80 MM HG: ICD-10-PCS | Mod: CPTII,S$GLB,, | Performed by: NURSE PRACTITIONER

## 2022-03-04 PROCEDURE — 3008F BODY MASS INDEX DOCD: CPT | Mod: CPTII,S$GLB,, | Performed by: NURSE PRACTITIONER

## 2022-03-04 PROCEDURE — 99999 PR PBB SHADOW E&M-EST. PATIENT-LVL IV: ICD-10-PCS | Mod: PBBFAC,,, | Performed by: NURSE PRACTITIONER

## 2022-03-04 PROCEDURE — 80053 COMPREHEN METABOLIC PANEL: CPT | Performed by: ANESTHESIOLOGY

## 2022-03-04 PROCEDURE — 86901 BLOOD TYPING SEROLOGIC RH(D): CPT | Performed by: ANESTHESIOLOGY

## 2022-03-04 PROCEDURE — 99214 OFFICE O/P EST MOD 30 MIN: CPT | Mod: S$GLB,,, | Performed by: NURSE PRACTITIONER

## 2022-03-04 PROCEDURE — 99999 PR PBB SHADOW E&M-EST. PATIENT-LVL IV: CPT | Mod: PBBFAC,,, | Performed by: NURSE PRACTITIONER

## 2022-03-04 PROCEDURE — 1159F MED LIST DOCD IN RCRD: CPT | Mod: CPTII,S$GLB,, | Performed by: NURSE PRACTITIONER

## 2022-03-04 PROCEDURE — 3074F PR MOST RECENT SYSTOLIC BLOOD PRESSURE < 130 MM HG: ICD-10-PCS | Mod: CPTII,S$GLB,, | Performed by: NURSE PRACTITIONER

## 2022-03-04 PROCEDURE — 1160F RVW MEDS BY RX/DR IN RCRD: CPT | Mod: CPTII,S$GLB,, | Performed by: NURSE PRACTITIONER

## 2022-03-04 RX ORDER — IBUPROFEN 200 MG
200 TABLET ORAL EVERY 6 HOURS PRN
Status: ON HOLD | COMMUNITY
End: 2022-03-10 | Stop reason: HOSPADM

## 2022-03-04 RX ORDER — MINERAL OIL
180 ENEMA (ML) RECTAL DAILY
COMMUNITY
End: 2022-06-29

## 2022-03-04 NOTE — PROGRESS NOTES
[facility] 2nd [facility]  Progress Note    Patient Name: Francis Nagel  MRN: [id number]  Date of Evaluation- 03/04/2022  PCP- Primary Doctor No    Future cases for Francis Nagel [[id number]]     Case ID Status Date Time Edgar Procedure Provider Location    [id number] [facility] 3/10/2022 10:35  XI ROBOTIC SALPINGO-OOPHORECTOMY/ USO Charmaine Delacruz MD [[id number]] [facility] OR 2ND FLR          HPI:  This is a 31 y.o. female  who presents today for a preoperative evaluation in preparation for a GYN  procedure.  Scheduled for  3/10/22  Surgery is indicated for Robotic oophorectomy.   Patient is new to me.  Details of current problem: The duration of problem began in  October 2021. Self was referred to Dr. Delacruz for a second opinion regarding pelvic mass and elevated .   Reports symptoms of  long periods 2-3 times, could feel a mass on her left abdomen, pain intermittently, increased urination  Aggravating factors include: drinking, eating a lot of food    Relieving factors are  none identified  Treated with  surgery  Reports pain: 0/10  The history has been obtained by speaking with the patient and reviewing the electronic medical record and/or outside health information. Significant health conditions for the perioperative period are discussed below in assessment and plan.     Patient reports current health status to be Good.  Denies any new symptoms before surgery.         Subjective/ Objective:     Chief Complaint: Preoperative evaulation, perioperative medical management, and complication reduction plan.     Functional Capacity: Able to climb a flight of stairs without CP SOB or Syncope.  Able to meet 4 METs.  Runs/Walks 1 time per week.  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 No pertinent past medical history.      Past Medical History Pertinent Negatives:   Diagnosis Date Noted    Anemia 03/04/2022    Anxiety 03/04/2022    Asthma 03/04/2022    Cancer 03/04/2022    CHF (congestive heart failure) 03/04/2022    COPD (chronic obstructive pulmonary disease) 03/04/2022    Coronary artery disease 03/04/2022    Deep vein thrombosis 03/04/2022    Depression 03/04/2022    Diabetes mellitus, type 2 03/04/2022    Disorder of kidney and ureter 03/04/2022    GERD (gastroesophageal reflux disease) 03/04/2022    Heart murmur 03/04/2022    Hyperlipidemia 03/04/2022    Hypertension 03/04/2022    Myocardial infarction 03/04/2022    Pulmonary embolism 03/04/2022    Seizures 03/04/2022    Stroke 03/04/2022    Thyroid disease 03/04/2022     History reviewed. No pertinent surgical history.    Review of Systems   Constitutional: Positive for fatigue. Negative for chills and fever.   HENT: Negative for trouble swallowing and voice change.    Eyes: Negative for photophobia and visual disturbance.        No acute visual changes   Respiratory: Negative for apnea, cough, chest tightness, shortness of breath and wheezing.         STOP bang  Score 0  Low risk CONRAD     Cardiovascular: Negative for chest pain, palpitations and leg swelling.   Gastrointestinal: Positive for abdominal distention, abdominal pain and nausea. Negative for blood in stool, constipation, diarrhea and vomiting.        NO FLD, hepatitis, cirrhosis  No BRB or black tarry stool     Endocrine: Negative for cold intolerance, heat intolerance, polydipsia, polyphagia and polyuria.   Genitourinary: Positive for frequency and urgency. Negative for difficulty urinating, dysuria, flank pain and hematuria.   Musculoskeletal: Negative for arthralgias, back pain, gait problem, joint swelling, myalgias, neck pain and neck stiffness.   Neurological: Positive for  "dizziness and headaches. Negative for tremors, seizures, syncope, weakness, light-headedness and numbness.   Psychiatric/Behavioral: Negative for suicidal ideas. The patient is not nervous/anxious.       VITALS  Visit Vitals  /76   Pulse 93   Temp 98.2 °F (36.8 °C) (Oral)   Resp 16   Ht 5' 1" (1.549 m)   Wt 51.7 kg (114 lb)   SpO2 99%   BMI 21.54 kg/m²          Physical Exam  Constitutional:       General: She is not in acute distress.     Appearance: Normal appearance. She is well-developed. She is not diaphoretic.   HENT:      Head: Normocephalic.      Mouth/Throat:      Pharynx: No oropharyngeal exudate.   Eyes:      General: Lids are normal.         Right eye: No discharge.         Left eye: No discharge.      Conjunctiva/sclera: Conjunctivae normal.   Neck:      Thyroid: No thyromegaly.      Vascular: No JVD.      Trachea: Trachea normal. No tracheal deviation.   Cardiovascular:      Rate and Rhythm: Normal rate and regular rhythm.      Pulses:           Carotid pulses are 2+ on the right side and 2+ on the left side.       Radial pulses are 2+ on the right side and 2+ on the left side.        Posterior tibial pulses are 2+ on the right side and 2+ on the left side.      Heart sounds: Normal heart sounds. No murmur heard.    No friction rub. No gallop.   Pulmonary:      Effort: Pulmonary effort is normal. No respiratory distress.      Breath sounds: Normal breath sounds. No stridor. No wheezing or rales.   Chest:      Chest wall: No tenderness.   Abdominal:      General: Bowel sounds are normal. There is no distension.      Palpations: Abdomen is soft.      Tenderness: There is no abdominal tenderness. There is no guarding.   Musculoskeletal:         General: No tenderness or deformity. Normal range of motion.      Cervical back: Normal range of motion and neck supple.   Lymphadenopathy:      Head:      Right side of head: No submental, submandibular, tonsillar, preauricular, posterior auricular or " occipital adenopathy.      Left side of head: No submental, submandibular, tonsillar, preauricular, posterior auricular or occipital adenopathy.      Cervical: No cervical adenopathy.      Right cervical: No superficial cervical adenopathy.     Left cervical: No superficial cervical adenopathy.   Skin:     General: Skin is warm and dry.      Capillary Refill: Capillary refill takes 2 to 3 seconds.      Coloration: Skin is not pale.      Findings: No erythema or rash.   Neurological:      Mental Status: She is alert and oriented to person, place, and time.      GCS: GCS eye subscore is 4. GCS verbal subscore is 5. GCS motor subscore is 6.      Motor: No abnormal muscle tone.      Coordination: Coordination normal.   Psychiatric:         Attention and Perception: Attention and perception normal.         Mood and Affect: Mood and affect normal.         Speech: Speech normal.         Behavior: Behavior normal. Behavior is cooperative.         Thought Content: Thought content normal.         Cognition and Memory: Cognition and memory normal.         Judgment: Judgment normal.          Significant Labs:  Lab Results   Component Value Date    WBC 5.18 03/04/2022    HGB 12.4 03/04/2022    HCT 39.2 03/04/2022     03/04/2022    ALT 11 03/04/2022    AST 14 03/04/2022     (L) 03/04/2022    K 3.8 03/04/2022     03/04/2022    CREATININE 0.6 03/04/2022    BUN 6 03/04/2022    CO2 27 03/04/2022       Diagnostic Studies: No relevant studies.    EKG:   No results found for this or any previous visit.    2D ECHO:  TTE:  No results found for this or any previous visit.    WU:  No results found for this or any previous visit.     Imaging     Active Cardiac Conditions: None      Revised Cardiac Risk Index   High -Risk Surgery  Intraperitoneal; Intrathoracic; suprainguinal vascular Yes- + 1 No- 0   History of Ischemic Heart Disease   (Hx of MI/positive exercise test/current chest pain due to ischemia/use of nitrate  therapy/EKG with pathological Q waves) Yes- + 1 No- 0   History of CHF  (Pulmonary edema/bilateral rales or S3 gallop/PND/CXR showing pulmonary vascular redistribution) Yes- + 1 No- 0   History of CVA   (Prior stroke or TIA) Yes- + 1 No- 0   Pre-operative treatment with insulin Yes- + 1 No- 0   Pre-operative creatinine > 2mg/dl Yes- + 1 No- 0   Total:      Risk Status:  Estimated risk of cardiac complications after non-cardiac surgery using the Revised Cardiac Risk Index for Preoperative risk is 3.9 %      ARISCAT (Canet) risk index: Low: 1.6% risk of post-op pulmonary complications.    American Society of Anesthesiologists Physical Status classification (ASA): 2           No further cardiac workup needed prior to surgery.        Preoperative cardiac risk assessment-  The patient does not have any active cardiac conditions . Revised cardiac risk index predictors- ---.Functional capacity is more than 4 Mets. She will be undergoing a Gynecological procedure that carries a Moderate Risk risk     The estimated risk of the rate of adverse cardiac outcomes  3.9%    No further cardiac work up is indicated prior to proceeding with the surgery          American Society of Anesthesiologists Physical status classification ( ASA ) class: 2     Postoperative pulmonary complication risk assessment: 1.6%     ARISCAT ( Canet) risk index- risk class -  Low, if duration of surgery is under 3 hours, intermediate, if duration of surgery is over 3 hours          Assessment/Plan:     Hyponatremia  Na 135        Preventive perioperative care    Thromboembolic prophylaxis:  Her risk factors for thrombosis include surgical procedure and reduced mobility.I suggest  thromboembolic prophylaxis ( mechanical/pharmacological, weighing the risk benefits of pharmacological agent use considering karlie procedural bleeding )  during the perioperative period.I suggested being active in the post operative period.      Postoperative pulmonary complication  prophylaxis-Risk factors for post operative pulmonary complications include I suggest incentive spirometry use, early ambulation and pain control so as to avoid diaphragmatic splinting  Brush teeth twice per day, oral rinses, sleep with the head of the bed up 30 degrees     Renal complication prophylaxis. I suggest keeping her well hydrated and avoidance/ minimizing the use of  NSAID's,JEONG 2 Inhibitors ,IV contrast if possible in the perioperative period.I suggested drinking 2 litre's of water a day      Surgical site Infection Prophylaxis-I  suggest appropriate antibiotic for Prophylaxis against Surgical site infections Shower with Hibiclens in the night before surgery and the morning of surgery     This visit was focused on Preoperative evaluation, Perioperative Medical management, complication reduction plans. I suggest that the patient follows up with primary care or relevant sub specialists for ongoing health care.    I appreciate the opportunity to be involved in this patients care. Please feel free to contact me if there were any questions about this consultation.    Patient is optimized     Cornell Bush NP  Perioperative Medicine  [facility]   Pager [phone]

## 2022-03-04 NOTE — HPI
This is a 31 y.o. female  who presents today for a preoperative evaluation in preparation for a GYN  procedure.  Scheduled for  3/10/22  Surgery is indicated for Robotic oophorectomy.   Patient is new to me.  Details of current problem: The duration of problem began in  October 2021. Self was referred to Dr. Delacruz for a second opinion regarding pelvic mass and elevated .   Reports symptoms of  long periods 2-3 times, could feel a mass on her left abdomen, pain intermittently, increased urination  Aggravating factors include: drinking, eating a lot of food    Relieving factors are  none identified  Treated with  surgery  Reports pain: 0/10  The history has been obtained by speaking with the patient and reviewing the electronic medical record and/or outside health information. Significant health conditions for the perioperative period are discussed below in assessment and plan.     Patient reports current health status to be Good.  Denies any new symptoms before surgery.

## 2022-03-04 NOTE — PATIENT INSTRUCTIONS
Preventive perioperative care    Thromboembolic prophylaxis:  Her risk factors for thrombosis include surgical procedure and reduced mobility.I suggest  thromboembolic prophylaxis ( mechanical/pharmacological, weighing the risk benefits of pharmacological agent use considering karlie procedural bleeding )  during the perioperative period.I suggested being active in the post operative period.      Postoperative pulmonary complication prophylaxis-Risk factors for post operative pulmonary complications include I suggest incentive spirometry use, early ambulation and pain control so as to avoid diaphragmatic splinting  Brush teeth twice per day, oral rinses, sleep with the head of the bed up 30 degrees     Renal complication prophylaxis. I suggest keeping her well hydrated and avoidance/ minimizing the use of  NSAID's,JEONG 2 Inhibitors ,IV contrast if possible in the perioperative period.I suggested drinking 2 litre's of water a day      Surgical site Infection Prophylaxis-I  suggest appropriate antibiotic for Prophylaxis against Surgical site infections Shower with Hibiclens in the night before surgery and the morning of surgery     This visit was focused on Preoperative evaluation, Perioperative Medical management, complication reduction plans. I suggest that the patient follows up with primary care or relevant sub specialists for ongoing health care.    I appreciate the opportunity to be involved in this patients care. Please feel free to contact me if there were any questions about this consultation.    Patient is pending optimization

## 2022-03-09 ENCOUNTER — TELEPHONE (OUTPATIENT)
Dept: GYNECOLOGIC ONCOLOGY | Facility: CLINIC | Age: 32
End: 2022-03-09
Payer: COMMERCIAL

## 2022-03-09 ENCOUNTER — ANESTHESIA EVENT (OUTPATIENT)
Dept: SURGERY | Facility: HOSPITAL | Age: 32
DRG: 982 | End: 2022-03-09
Payer: COMMERCIAL

## 2022-03-09 ENCOUNTER — PATIENT MESSAGE (OUTPATIENT)
Dept: SURGERY | Facility: HOSPITAL | Age: 32
End: 2022-03-09
Payer: COMMERCIAL

## 2022-03-10 ENCOUNTER — HOSPITAL ENCOUNTER (INPATIENT)
Facility: HOSPITAL | Age: 32
LOS: 2 days | Discharge: HOME | DRG: 982 | End: 2022-03-12
Attending: OBSTETRICS & GYNECOLOGY | Admitting: OBSTETRICS & GYNECOLOGY
Payer: COMMERCIAL

## 2022-03-10 ENCOUNTER — ANESTHESIA (OUTPATIENT)
Dept: SURGERY | Facility: HOSPITAL | Age: 32
DRG: 982 | End: 2022-03-10
Payer: COMMERCIAL

## 2022-03-10 DIAGNOSIS — R97.1 ELEVATED CA-125: ICD-10-CM

## 2022-03-10 DIAGNOSIS — Z01.818 PREOPERATIVE TESTING: ICD-10-CM

## 2022-03-10 DIAGNOSIS — Z90.721 S/P UNILATERAL SALPINGO-OOPHORECTOMY: Primary | ICD-10-CM

## 2022-03-10 DIAGNOSIS — R19.00 PELVIC MASS: ICD-10-CM

## 2022-03-10 LAB
BASOPHILS # BLD AUTO: 0 K/UL (ref 0–0.2)
BASOPHILS NFR BLD: 0 % (ref 0–1.9)
DIFFERENTIAL METHOD: ABNORMAL
EOSINOPHIL # BLD AUTO: 0 K/UL (ref 0–0.5)
EOSINOPHIL NFR BLD: 0 % (ref 0–8)
ERYTHROCYTE [DISTWIDTH] IN BLOOD BY AUTOMATED COUNT: 11.5 % (ref 11.5–14.5)
HCT VFR BLD AUTO: 27.1 % (ref 37–48.5)
HGB BLD-MCNC: 8.7 G/DL (ref 12–16)
IMM GRANULOCYTES # BLD AUTO: 0.03 K/UL (ref 0–0.04)
IMM GRANULOCYTES NFR BLD AUTO: 0.3 % (ref 0–0.5)
LYMPHOCYTES # BLD AUTO: 0.5 K/UL (ref 1–4.8)
LYMPHOCYTES NFR BLD: 4.9 % (ref 18–48)
MCH RBC QN AUTO: 30.7 PG (ref 27–31)
MCHC RBC AUTO-ENTMCNC: 32.1 G/DL (ref 32–36)
MCV RBC AUTO: 96 FL (ref 82–98)
MONOCYTES # BLD AUTO: 0.4 K/UL (ref 0.3–1)
MONOCYTES NFR BLD: 3.5 % (ref 4–15)
NEUTROPHILS # BLD AUTO: 10.1 K/UL (ref 1.8–7.7)
NEUTROPHILS NFR BLD: 91.3 % (ref 38–73)
NRBC BLD-RTO: 0 /100 WBC
PLATELET # BLD AUTO: 226 K/UL (ref 150–450)
PMV BLD AUTO: 9.3 FL (ref 9.2–12.9)
RBC # BLD AUTO: 2.83 M/UL (ref 4–5.4)
WBC # BLD AUTO: 11.06 K/UL (ref 3.9–12.7)

## 2022-03-10 PROCEDURE — 20600001 HC STEP DOWN PRIVATE ROOM

## 2022-03-10 PROCEDURE — 36000711: Performed by: OBSTETRICS & GYNECOLOGY

## 2022-03-10 PROCEDURE — 25000003 PHARM REV CODE 250: Performed by: STUDENT IN AN ORGANIZED HEALTH CARE EDUCATION/TRAINING PROGRAM

## 2022-03-10 PROCEDURE — 88341 IMHCHEM/IMCYTCHM EA ADD ANTB: CPT | Mod: 59 | Performed by: PATHOLOGY

## 2022-03-10 PROCEDURE — 88112 CYTOPATH CELL ENHANCE TECH: CPT | Mod: 26,,, | Performed by: PATHOLOGY

## 2022-03-10 PROCEDURE — 88341 PR IHC OR ICC EACH ADD'L SINGLE ANTIBODY  STAINPR: ICD-10-PCS | Mod: 26,,, | Performed by: PATHOLOGY

## 2022-03-10 PROCEDURE — 27201423 OPTIME MED/SURG SUP & DEVICES STERILE SUPPLY: Performed by: OBSTETRICS & GYNECOLOGY

## 2022-03-10 PROCEDURE — 58661 PR LAP,RMV  ADNEXAL STRUCTURE: ICD-10-PCS | Mod: 22,AS,LT, | Performed by: NURSE PRACTITIONER

## 2022-03-10 PROCEDURE — 58661 LAPAROSCOPY REMOVE ADNEXA: CPT | Mod: 22,AS,LT, | Performed by: NURSE PRACTITIONER

## 2022-03-10 PROCEDURE — 88305 TISSUE EXAM BY PATHOLOGIST: ICD-10-PCS | Mod: 26,,, | Performed by: PATHOLOGY

## 2022-03-10 PROCEDURE — 63600175 PHARM REV CODE 636 W HCPCS: Performed by: STUDENT IN AN ORGANIZED HEALTH CARE EDUCATION/TRAINING PROGRAM

## 2022-03-10 PROCEDURE — 37000008 HC ANESTHESIA 1ST 15 MINUTES: Performed by: OBSTETRICS & GYNECOLOGY

## 2022-03-10 PROCEDURE — 88112 PR  CYTOPATH, CELL ENHANCE TECH: ICD-10-PCS | Mod: 26,,, | Performed by: PATHOLOGY

## 2022-03-10 PROCEDURE — 58661 PR LAP,RMV  ADNEXAL STRUCTURE: ICD-10-PCS | Mod: 22,LT,, | Performed by: OBSTETRICS & GYNECOLOGY

## 2022-03-10 PROCEDURE — 88305 TISSUE EXAM BY PATHOLOGIST: CPT | Performed by: PATHOLOGY

## 2022-03-10 PROCEDURE — 36415 COLL VENOUS BLD VENIPUNCTURE: CPT | Performed by: OBSTETRICS & GYNECOLOGY

## 2022-03-10 PROCEDURE — 88342 IMHCHEM/IMCYTCHM 1ST ANTB: CPT | Mod: 91 | Performed by: PATHOLOGY

## 2022-03-10 PROCEDURE — 88341 IMHCHEM/IMCYTCHM EA ADD ANTB: CPT | Mod: 26,,, | Performed by: PATHOLOGY

## 2022-03-10 PROCEDURE — 88342 IMHCHEM/IMCYTCHM 1ST ANTB: CPT | Performed by: PATHOLOGY

## 2022-03-10 PROCEDURE — 25000003 PHARM REV CODE 250: Performed by: OBSTETRICS & GYNECOLOGY

## 2022-03-10 PROCEDURE — 71000016 HC POSTOP RECOV ADDL HR: Performed by: OBSTETRICS & GYNECOLOGY

## 2022-03-10 PROCEDURE — 85025 COMPLETE CBC W/AUTO DIFF WBC: CPT | Performed by: STUDENT IN AN ORGANIZED HEALTH CARE EDUCATION/TRAINING PROGRAM

## 2022-03-10 PROCEDURE — 88342 CHG IMMUNOCYTOCHEMISTRY: ICD-10-PCS | Mod: 26,,, | Performed by: PATHOLOGY

## 2022-03-10 PROCEDURE — 88342 IMHCHEM/IMCYTCHM 1ST ANTB: CPT | Mod: 26,,, | Performed by: PATHOLOGY

## 2022-03-10 PROCEDURE — 71000015 HC POSTOP RECOV 1ST HR: Performed by: OBSTETRICS & GYNECOLOGY

## 2022-03-10 PROCEDURE — 36000710: Performed by: OBSTETRICS & GYNECOLOGY

## 2022-03-10 PROCEDURE — 58661 LAPAROSCOPY REMOVE ADNEXA: CPT | Mod: 22,LT,, | Performed by: OBSTETRICS & GYNECOLOGY

## 2022-03-10 PROCEDURE — 88112 CYTOPATH CELL ENHANCE TECH: CPT | Performed by: PATHOLOGY

## 2022-03-10 PROCEDURE — 88307 TISSUE EXAM BY PATHOLOGIST: CPT | Performed by: PATHOLOGY

## 2022-03-10 PROCEDURE — D9220A PRA ANESTHESIA: Mod: ,,, | Performed by: STUDENT IN AN ORGANIZED HEALTH CARE EDUCATION/TRAINING PROGRAM

## 2022-03-10 PROCEDURE — 94761 N-INVAS EAR/PLS OXIMETRY MLT: CPT

## 2022-03-10 PROCEDURE — 88305 TISSUE EXAM BY PATHOLOGIST: CPT | Mod: 26,,, | Performed by: PATHOLOGY

## 2022-03-10 PROCEDURE — 37000009 HC ANESTHESIA EA ADD 15 MINS: Performed by: OBSTETRICS & GYNECOLOGY

## 2022-03-10 PROCEDURE — D9220A PRA ANESTHESIA: ICD-10-PCS | Mod: ,,, | Performed by: STUDENT IN AN ORGANIZED HEALTH CARE EDUCATION/TRAINING PROGRAM

## 2022-03-10 PROCEDURE — 71000033 HC RECOVERY, INTIAL HOUR: Performed by: OBSTETRICS & GYNECOLOGY

## 2022-03-10 PROCEDURE — 27000221 HC OXYGEN, UP TO 24 HOURS

## 2022-03-10 RX ORDER — SODIUM CHLORIDE 9 MG/ML
INJECTION, SOLUTION INTRAVENOUS CONTINUOUS
Status: DISCONTINUED | OUTPATIENT
Start: 2022-03-10 | End: 2022-03-12 | Stop reason: HOSPADM

## 2022-03-10 RX ORDER — IBUPROFEN 600 MG/1
600 TABLET ORAL EVERY 6 HOURS
Status: DISCONTINUED | OUTPATIENT
Start: 2022-03-11 | End: 2022-03-10

## 2022-03-10 RX ORDER — OXYCODONE HYDROCHLORIDE 5 MG/1
5 TABLET ORAL EVERY 4 HOURS PRN
Status: DISCONTINUED | OUTPATIENT
Start: 2022-03-10 | End: 2022-03-12 | Stop reason: HOSPADM

## 2022-03-10 RX ORDER — ONDANSETRON 2 MG/ML
4 INJECTION INTRAMUSCULAR; INTRAVENOUS EVERY 8 HOURS PRN
Status: DISCONTINUED | OUTPATIENT
Start: 2022-03-10 | End: 2022-03-12 | Stop reason: HOSPADM

## 2022-03-10 RX ORDER — ACETAMINOPHEN 10 MG/ML
INJECTION, SOLUTION INTRAVENOUS
Status: DISCONTINUED | OUTPATIENT
Start: 2022-03-10 | End: 2022-03-10

## 2022-03-10 RX ORDER — DEXAMETHASONE SODIUM PHOSPHATE 4 MG/ML
INJECTION, SOLUTION INTRA-ARTICULAR; INTRALESIONAL; INTRAMUSCULAR; INTRAVENOUS; SOFT TISSUE
Status: DISCONTINUED | OUTPATIENT
Start: 2022-03-10 | End: 2022-03-10

## 2022-03-10 RX ORDER — MECLIZINE HYDROCHLORIDE 25 MG/1
25 TABLET ORAL EVERY 6 HOURS
Status: DISCONTINUED | OUTPATIENT
Start: 2022-03-11 | End: 2022-03-10

## 2022-03-10 RX ORDER — FENTANYL CITRATE 50 UG/ML
25 INJECTION, SOLUTION INTRAMUSCULAR; INTRAVENOUS EVERY 5 MIN PRN
Status: DISCONTINUED | OUTPATIENT
Start: 2022-03-10 | End: 2022-03-10 | Stop reason: HOSPADM

## 2022-03-10 RX ORDER — OXYCODONE HYDROCHLORIDE 5 MG/1
5 TABLET ORAL
Status: DISCONTINUED | OUTPATIENT
Start: 2022-03-10 | End: 2022-03-10 | Stop reason: SDUPTHER

## 2022-03-10 RX ORDER — ONDANSETRON 2 MG/ML
4 INJECTION INTRAMUSCULAR; INTRAVENOUS DAILY PRN
Status: DISCONTINUED | OUTPATIENT
Start: 2022-03-10 | End: 2022-03-10 | Stop reason: SDUPTHER

## 2022-03-10 RX ORDER — PHENYLEPHRINE HCL IN 0.9% NACL 1 MG/10 ML
SYRINGE (ML) INTRAVENOUS
Status: DISCONTINUED | OUTPATIENT
Start: 2022-03-10 | End: 2022-03-10

## 2022-03-10 RX ORDER — DEXTROMETHORPHAN HYDROBROMIDE, GUAIFENESIN 5; 100 MG/5ML; MG/5ML
650 LIQUID ORAL
Qty: 60 TABLET | Refills: 1 | Status: SHIPPED | OUTPATIENT
Start: 2022-03-10 | End: 2022-06-29

## 2022-03-10 RX ORDER — PROMETHAZINE HYDROCHLORIDE 25 MG/1
25 TABLET ORAL EVERY 6 HOURS PRN
Status: DISCONTINUED | OUTPATIENT
Start: 2022-03-10 | End: 2022-03-12 | Stop reason: HOSPADM

## 2022-03-10 RX ORDER — MUPIROCIN 20 MG/G
OINTMENT TOPICAL
Status: DISCONTINUED | OUTPATIENT
Start: 2022-03-10 | End: 2022-03-10 | Stop reason: HOSPADM

## 2022-03-10 RX ORDER — FENTANYL CITRATE 50 UG/ML
INJECTION, SOLUTION INTRAMUSCULAR; INTRAVENOUS
Status: DISCONTINUED | OUTPATIENT
Start: 2022-03-10 | End: 2022-03-10

## 2022-03-10 RX ORDER — LIDOCAINE HYDROCHLORIDE 20 MG/ML
INJECTION, SOLUTION EPIDURAL; INFILTRATION; INTRACAUDAL; PERINEURAL
Status: DISCONTINUED | OUTPATIENT
Start: 2022-03-10 | End: 2022-03-10

## 2022-03-10 RX ORDER — ONDANSETRON 2 MG/ML
INJECTION INTRAMUSCULAR; INTRAVENOUS
Status: DISCONTINUED | OUTPATIENT
Start: 2022-03-10 | End: 2022-03-10

## 2022-03-10 RX ORDER — OXYCODONE HYDROCHLORIDE 10 MG/1
10 TABLET ORAL EVERY 4 HOURS PRN
Status: DISCONTINUED | OUTPATIENT
Start: 2022-03-10 | End: 2022-03-12 | Stop reason: HOSPADM

## 2022-03-10 RX ORDER — IBUPROFEN 600 MG/1
600 TABLET ORAL EVERY 6 HOURS PRN
Qty: 60 TABLET | Refills: 1 | Status: SHIPPED | OUTPATIENT
Start: 2022-03-10 | End: 2022-05-05 | Stop reason: CLARIF

## 2022-03-10 RX ORDER — SODIUM CHLORIDE 0.9 % (FLUSH) 0.9 %
10 SYRINGE (ML) INJECTION
Status: DISCONTINUED | OUTPATIENT
Start: 2022-03-10 | End: 2022-03-12 | Stop reason: HOSPADM

## 2022-03-10 RX ORDER — ROCURONIUM BROMIDE 10 MG/ML
INJECTION, SOLUTION INTRAVENOUS
Status: DISCONTINUED | OUTPATIENT
Start: 2022-03-10 | End: 2022-03-10

## 2022-03-10 RX ORDER — NEOSTIGMINE METHYLSULFATE 0.5 MG/ML
INJECTION, SOLUTION INTRAVENOUS
Status: DISCONTINUED | OUTPATIENT
Start: 2022-03-10 | End: 2022-03-10

## 2022-03-10 RX ORDER — PHENYLEPHRINE HYDROCHLORIDE 10 MG/ML
INJECTION INTRAVENOUS
Status: DISCONTINUED | OUTPATIENT
Start: 2022-03-10 | End: 2022-03-10

## 2022-03-10 RX ORDER — MIDAZOLAM HYDROCHLORIDE 1 MG/ML
INJECTION, SOLUTION INTRAMUSCULAR; INTRAVENOUS
Status: DISCONTINUED | OUTPATIENT
Start: 2022-03-10 | End: 2022-03-10

## 2022-03-10 RX ORDER — MECLIZINE HYDROCHLORIDE 25 MG/1
25 TABLET ORAL EVERY 6 HOURS
Status: COMPLETED | OUTPATIENT
Start: 2022-03-10 | End: 2022-03-10

## 2022-03-10 RX ORDER — HYDROMORPHONE HYDROCHLORIDE 1 MG/ML
0.5 INJECTION, SOLUTION INTRAMUSCULAR; INTRAVENOUS; SUBCUTANEOUS
Status: DISCONTINUED | OUTPATIENT
Start: 2022-03-10 | End: 2022-03-12 | Stop reason: HOSPADM

## 2022-03-10 RX ORDER — HYDROCODONE BITARTRATE AND ACETAMINOPHEN 5; 325 MG/1; MG/1
1 TABLET ORAL EVERY 6 HOURS PRN
Qty: 10 TABLET | Refills: 0 | Status: SHIPPED | OUTPATIENT
Start: 2022-03-10 | End: 2022-05-05 | Stop reason: CLARIF

## 2022-03-10 RX ORDER — CEFAZOLIN SODIUM 1 G/3ML
INJECTION, POWDER, FOR SOLUTION INTRAMUSCULAR; INTRAVENOUS
Status: DISCONTINUED | OUTPATIENT
Start: 2022-03-10 | End: 2022-03-10

## 2022-03-10 RX ORDER — ACETAMINOPHEN 325 MG/1
650 TABLET ORAL
Status: DISCONTINUED | OUTPATIENT
Start: 2022-03-10 | End: 2022-03-12 | Stop reason: HOSPADM

## 2022-03-10 RX ORDER — CEFAZOLIN SODIUM/WATER 2 G/20 ML
2 SYRINGE (ML) INTRAVENOUS
Status: DISCONTINUED | OUTPATIENT
Start: 2022-03-10 | End: 2022-03-10 | Stop reason: HOSPADM

## 2022-03-10 RX ORDER — HYDROMORPHONE HYDROCHLORIDE 1 MG/ML
0.2 INJECTION, SOLUTION INTRAMUSCULAR; INTRAVENOUS; SUBCUTANEOUS EVERY 5 MIN PRN
Status: DISCONTINUED | OUTPATIENT
Start: 2022-03-10 | End: 2022-03-10 | Stop reason: HOSPADM

## 2022-03-10 RX ORDER — LIDOCAINE HYDROCHLORIDE 10 MG/ML
1 INJECTION, SOLUTION EPIDURAL; INFILTRATION; INTRACAUDAL; PERINEURAL ONCE
Status: DISCONTINUED | OUTPATIENT
Start: 2022-03-10 | End: 2022-03-10 | Stop reason: HOSPADM

## 2022-03-10 RX ORDER — PROPOFOL 10 MG/ML
VIAL (ML) INTRAVENOUS
Status: DISCONTINUED | OUTPATIENT
Start: 2022-03-10 | End: 2022-03-10

## 2022-03-10 RX ORDER — HYDROCODONE BITARTRATE AND ACETAMINOPHEN 5; 325 MG/1; MG/1
1 TABLET ORAL ONCE AS NEEDED
Status: COMPLETED | OUTPATIENT
Start: 2022-03-10 | End: 2022-03-10

## 2022-03-10 RX ADMIN — HYDROMORPHONE HYDROCHLORIDE 0.2 MG: 1 INJECTION, SOLUTION INTRAMUSCULAR; INTRAVENOUS; SUBCUTANEOUS at 02:03

## 2022-03-10 RX ADMIN — ONDANSETRON 4 MG: 2 INJECTION INTRAMUSCULAR; INTRAVENOUS at 07:03

## 2022-03-10 RX ADMIN — ONDANSETRON HYDROCHLORIDE 4 MG: 2 INJECTION INTRAMUSCULAR; INTRAVENOUS at 01:03

## 2022-03-10 RX ADMIN — OXYCODONE 5 MG: 5 TABLET ORAL at 02:03

## 2022-03-10 RX ADMIN — NEOSTIGMINE METHYLSULFATE 3 MG: 0.5 INJECTION INTRAVENOUS at 01:03

## 2022-03-10 RX ADMIN — MUPIROCIN: 20 OINTMENT TOPICAL at 09:03

## 2022-03-10 RX ADMIN — ROCURONIUM BROMIDE 10 MG: 10 INJECTION, SOLUTION INTRAVENOUS at 11:03

## 2022-03-10 RX ADMIN — SODIUM CHLORIDE, SODIUM GLUCONATE, SODIUM ACETATE, POTASSIUM CHLORIDE, MAGNESIUM CHLORIDE, SODIUM PHOSPHATE, DIBASIC, AND POTASSIUM PHOSPHATE: .53; .5; .37; .037; .03; .012; .00082 INJECTION, SOLUTION INTRAVENOUS at 12:03

## 2022-03-10 RX ADMIN — MIDAZOLAM HYDROCHLORIDE 2 MG: 1 INJECTION, SOLUTION INTRAMUSCULAR; INTRAVENOUS at 10:03

## 2022-03-10 RX ADMIN — Medication 150 MCG: at 12:03

## 2022-03-10 RX ADMIN — HYDROCODONE BITARTRATE AND ACETAMINOPHEN 1 TABLET: 5; 325 TABLET ORAL at 04:03

## 2022-03-10 RX ADMIN — ACETAMINOPHEN 650 MG: 325 TABLET ORAL at 10:03

## 2022-03-10 RX ADMIN — MECLIZINE HYDROCHLORIDE 25 MG: 25 TABLET ORAL at 06:03

## 2022-03-10 RX ADMIN — PROMETHAZINE HYDROCHLORIDE 25 MG: 25 TABLET ORAL at 10:03

## 2022-03-10 RX ADMIN — OXYCODONE 5 MG: 5 TABLET ORAL at 08:03

## 2022-03-10 RX ADMIN — PROPOFOL 110 MG: 10 INJECTION, EMULSION INTRAVENOUS at 10:03

## 2022-03-10 RX ADMIN — ACETAMINOPHEN 1000 MG: 10 INJECTION, SOLUTION INTRAVENOUS at 12:03

## 2022-03-10 RX ADMIN — PHENYLEPHRINE HYDROCHLORIDE 100 MCG: 10 INJECTION INTRAVENOUS at 11:03

## 2022-03-10 RX ADMIN — LIDOCAINE HYDROCHLORIDE 60 MG: 20 INJECTION, SOLUTION EPIDURAL; INFILTRATION; INTRACAUDAL; PERINEURAL at 10:03

## 2022-03-10 RX ADMIN — PROPOFOL 40 MG: 10 INJECTION, EMULSION INTRAVENOUS at 01:03

## 2022-03-10 RX ADMIN — DEXAMETHASONE SODIUM PHOSPHATE 4 MG: 4 INJECTION, SOLUTION INTRAMUSCULAR; INTRAVENOUS at 10:03

## 2022-03-10 RX ADMIN — SODIUM CHLORIDE: 0.9 INJECTION, SOLUTION INTRAVENOUS at 10:03

## 2022-03-10 RX ADMIN — ROCURONIUM BROMIDE 15 MG: 10 INJECTION, SOLUTION INTRAVENOUS at 12:03

## 2022-03-10 RX ADMIN — SODIUM CHLORIDE: 0.9 INJECTION, SOLUTION INTRAVENOUS at 08:03

## 2022-03-10 RX ADMIN — FENTANYL CITRATE 50 MCG: 50 INJECTION, SOLUTION INTRAMUSCULAR; INTRAVENOUS at 01:03

## 2022-03-10 RX ADMIN — CEFAZOLIN 2 G: 330 INJECTION, POWDER, FOR SOLUTION INTRAMUSCULAR; INTRAVENOUS at 10:03

## 2022-03-10 RX ADMIN — GLYCOPYRROLATE 0.4 MG: 0.2 INJECTION, SOLUTION INTRAMUSCULAR; INTRAVITREAL at 01:03

## 2022-03-10 RX ADMIN — FENTANYL CITRATE 100 MCG: 50 INJECTION, SOLUTION INTRAMUSCULAR; INTRAVENOUS at 10:03

## 2022-03-10 RX ADMIN — ROCURONIUM BROMIDE 40 MG: 10 INJECTION, SOLUTION INTRAVENOUS at 10:03

## 2022-03-10 NOTE — ANESTHESIA POSTPROCEDURE EVALUATION
Anesthesia Post Evaluation    Patient: Francis Nagel    Procedure(s) Performed: Procedure(s) (LRB):  XI ROBOTIC SALPINGO-OOPHORECTOMY/ USO (Left)    Final Anesthesia Type: general      Patient location during evaluation: PACU  Patient participation: Yes- Able to Participate  Level of consciousness: awake and alert  Post-procedure vital signs: reviewed and stable  Pain management: adequate  Airway patency: patent  CONRAD mitigation strategies: Multimodal analgesia  PONV status at discharge: No PONV  Anesthetic complications: no      Cardiovascular status: blood pressure returned to baseline and hemodynamically stable  Respiratory status: unassisted  Hydration status: euvolemic  Follow-up not needed.          Vitals Value Taken Time   BP 97/55 03/10/22 1401   Temp 36.3 °C (97.3 °F) 03/10/22 1358   Pulse 73 03/10/22 1409   Resp 16 03/10/22 1409   SpO2 100 % 03/10/22 1409   Vitals shown include unvalidated device data.      No case tracking events are documented in the log.      Pain/Damián Score: Pain Rating Prior to Med Admin: 8 (3/10/2022  2:04 PM)  Damián Score: 7 (3/10/2022  1:58 PM)

## 2022-03-10 NOTE — PLAN OF CARE
Patient states they are ready to be discharged. Instructions and prescription given to patient and family. Both verbalize understanding. Patient tolerating po liquids with no difficulty. Patient states pain is at a tolerable level for them. Anesthesia consent and surgical consent in chart upon patient's discharge from [facility].

## 2022-03-10 NOTE — ANESTHESIA PROCEDURE NOTES
Intubation    Date/Time: 3/10/2022 10:40 AM  Performed by: Dora Baltazar MD  Authorized by: Dora Baltazar MD     Intubation:     Induction:  Intravenous    Intubated:  Postinduction    Attempts:  1    Attempted By:  Staff anesthesiologist    Method of Intubation:  Direct    Blade:  Luis 3    Laryngeal View Grade: Grade I - full view of cords      Difficult Airway Encountered?: No      Complications:  None    Airway Device:  Oral endotracheal tube    Airway Device Size:  7.0    Style/Cuff Inflation:  Cuffed    Inflation Amount (mL):  5    Tube secured:  21    Placement Verified By:  Capnometry    Complicating Factors:  None    Findings Post-Intubation:  BS equal bilateral

## 2022-03-10 NOTE — OPERATIVE NOTE ADDENDUM
Certification of Assistant at Surgery       Surgery Date: 3/10/2022     Participating Surgeons:  Surgeon(s) and Role:     * Charmaine Delacruz MD - Primary     * Alfred Kent MD - Resident - Assisting    Procedures:  Procedure(s) (LRB):  XI ROBOTIC SALPINGO-OOPHORECTOMY/ USO (Left)    Assistant Surgeon's Certification of Necessity:  I understand that section 1842 (b) (6) (d) of the Social Security Act generally prohibits Medicare Part B reasonable charge payment for the services of assistants at surgery in teaching hospitals when qualified residents are available to furnish such services. I certify that the services for which payment is claimed were medically necessary, and that no qualified resident was available to perform the services. I further understand that these services are subject to post-payment review by the Medicare carrier.      Andra Devine NP    03/10/2022  2:16 PM

## 2022-03-10 NOTE — INTERVAL H&P NOTE
The patient has been examined and the H&P has been reviewed:    I concur with the findings and no changes have occurred since H&P was written.    Surgery risks, benefits and alternative options discussed and understood by patient/family.          There are no hospital problems to display for this patient.    JAIRO Kent MD  OBGYN PGY-3

## 2022-03-10 NOTE — ANESTHESIA RELEASE NOTE
"Anesthesia Release from PACU Note    Patient: Francis Nagel    Procedure(s) Performed: Procedure(s) (LRB):  XI ROBOTIC SALPINGO-OOPHORECTOMY/ USO (Left)    Anesthesia type: general    Post pain: Adequate analgesia    Post assessment: no apparent anesthetic complications    Last Vitals:   Visit Vitals  BP (!) 93/55 (BP Location: Right arm, Patient Position: Lying)   Pulse 76   Temp 36.3 °C (97.3 °F) (Temporal)   Resp 15   Ht 5' 1" (1.549 m)   Wt 49.9 kg (110 lb)   LMP 02/15/2022 (Exact Date)   SpO2 100%   Breastfeeding No   BMI 20.78 kg/m²       Post vital signs: stable    Level of consciousness: awake    Nausea/Vomiting: no nausea/no vomiting    Complications: none    Airway Patency: patent    Respiratory: unassisted    Cardiovascular: stable and blood pressure at baseline    Hydration: euvolemic  "

## 2022-03-10 NOTE — DISCHARGE SUMMARY
[facility] ([facility])  Brief Operative Note    Surgery Date: 3/10/2022     Surgeon(s) and Role:     * Charmaine Delacruz MD - Primary     * Alfred Kent MD - Resident - Assisting    Pre-op Diagnosis:  Cyst of ovary, unspecified laterality [N83.209]  Abdominal pain, unspecified abdominal location [R10.9]    Post-op Diagnosis:  Post-Op Diagnosis Codes:     * Cyst of ovary, unspecified laterality [N83.209]     * Abdominal pain, unspecified abdominal location [R10.9]    Procedure(s) (LRB):  XI ROBOTIC SALPINGO-OOPHORECTOMY/ USO (Left)    Anesthesia: General    Description of the findings of the procedure(s):   USO performed and 17cm ovary removed via minilap. Otherwise normal pelvic anatomy.     Estimated Blood Loss: * No values recorded between 3/10/2022 10:53 AM and 3/10/2022  1:54 PM *         Specimens:   Specimens (From admission, onward)    None             Discharge Note    OUTCOME: Patient tolerated treatment/procedure well without complication and is now ready for discharge.    DISPOSITION: Home or Self Care    FINAL DIAGNOSIS:  S/P unilateral salpingo-oophorectomy    FOLLOWUP: In clinic    DISCHARGE INSTRUCTIONS:    Discharge Procedure Orders   Comprehensive Metabolic Panel   Standing Status: Future Number of Occurrences: 1 Standing Exp. Date: 04/24/23     CBC Without Differential   Standing Status: Future Number of Occurrences: 1 Standing Exp. Date: 04/24/23     Diet Adult Regular     No driving until:   Order Comments: No driving while taking narcotics     Pelvic Rest   Order Comments: Nothing in vagina for two weeks     Notify your health care provider if you experience any of the following:  temperature >100.4     Notify your health care provider if you experience any of the following:  persistent nausea and vomiting or diarrhea     Notify your health care provider if you experience any of the following:  redness, tenderness, or signs of infection (pain, swelling, redness, odor or green/yellow  discharge around incision site)     Notify your health care provider if you experience any of the following:   Order Comments: Vaginal bleeding greater than 2 saturated pads in 1 hour.     Type & Screen   Standing Status: Future Number of Occurrences: 1 Standing Exp. Date: 04/24/23     Activity as tolerated            Medication List      START taking these medications    acetaminophen 650 MG Tbsr  Commonly known as: TYLENOL  Take 1 tablet (650 mg total) by mouth every 6 to 8 hours as needed (Pain). Alternate every 3 hours with ibuprofen.     HYDROcodone-acetaminophen 5-325 mg per tablet  Commonly known as: NORCO  Take 1 tablet by mouth every 6 (six) hours as needed for Pain.        CHANGE how you take these medications    ibuprofen 600 MG tablet  Commonly known as: ADVIL,MOTRIN  Take 1 tablet (600 mg total) by mouth every 6 (six) hours as needed for Pain. Alternate every 3 hours with tylenol  What changed:   · medication strength  · how much to take  · additional instructions        CONTINUE taking these medications    fexofenadine 180 MG tablet  Commonly known as: ALLEGRA           Where to Get Your Medications      These medications were sent to [facility] [address] [address]    Hours: Mon-Fri 7a-7p, Sat-Sun 10a-4p Phone: [phone]   · acetaminophen 650 MG Tbsr  · HYDROcodone-acetaminophen 5-325 mg per tablet  · ibuprofen 600 MG tablet          JAIRO Kent MD  OBGYN PGY-3

## 2022-03-10 NOTE — ANESTHESIA PREPROCEDURE EVALUATION
03/10/2022  Pre-operative evaluation for Procedure(s) (LRB):  XI ROBOTIC SALPINGO-OOPHORECTOMY/ USO (N/A)    Francis Nagel is a 31 y.o. female     Patient Active Problem List   Diagnosis    Pelvic mass    Elevated CA-125    Hyponatremia       Review of patient's allergies indicates:  No Known Allergies    No current facility-administered medications on file prior to encounter.     No current outpatient medications on file prior to encounter.       No past surgical history on file.    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    CMP: No results for input(s): NA, K, CL, CO2, BUN, CREATININE, GLU, MG, PHOS, CALCIUM, ALBUMIN, PROT, ALKPHOS, ALT, AST, BILITOT in the last 72 hours.    INR  No results for input(s): PT, INR, PROTIME, APTT in the last 72 hours.      2D Echo:  No results found for this or any previous visit.        Pre-op Assessment    I have reviewed the Patient Summary Reports.     I have reviewed the Nursing Notes. I have reviewed the NPO Status.   I have reviewed the Medications.     Review of Systems      Physical Exam  General: Well nourished and Cooperative    Airway:  Mallampati: II   Mouth Opening: Normal  TM Distance: Normal  Tongue: Normal  Neck ROM: Normal ROM    Chest/Lungs:  Clear to auscultation, Normal Respiratory Rate    Heart:  Rate: Normal  Rhythm: Regular Rhythm  Sounds: Normal        Anesthesia Plan  Type of  Anesthesia, risks & benefits discussed:    Anesthesia Type: Gen ETT  Intra-op Monitoring Plan: Standard ASA Monitors  Post Op Pain Control Plan: multimodal analgesia and IV/PO Opioids PRN  Induction:  IV  Airway Plan: Direct and Video, Post-Induction  Informed Consent: Informed consent signed with the Patient and all parties understand the risks and agree with anesthesia plan.  All questions answered.   ASA Score: 2    Ready For Surgery From Anesthesia Perspective.     .

## 2022-03-11 PROBLEM — D64.9 ANEMIA: Status: ACTIVE | Noted: 2022-03-11

## 2022-03-11 LAB
ABO + RH BLD: NORMAL
BASOPHILS # BLD AUTO: 0.01 K/UL (ref 0–0.2)
BASOPHILS # BLD AUTO: 0.02 K/UL (ref 0–0.2)
BASOPHILS NFR BLD: 0.1 % (ref 0–1.9)
BASOPHILS NFR BLD: 0.2 % (ref 0–1.9)
BLD GP AB SCN CELLS X3 SERPL QL: NORMAL
BLD PROD TYP BPU: NORMAL
BLD PROD TYP BPU: NORMAL
BLOOD UNIT EXPIRATION DATE: NORMAL
BLOOD UNIT EXPIRATION DATE: NORMAL
BLOOD UNIT TYPE CODE: 5100
BLOOD UNIT TYPE CODE: 5100
BLOOD UNIT TYPE: NORMAL
BLOOD UNIT TYPE: NORMAL
CODING SYSTEM: NORMAL
CODING SYSTEM: NORMAL
DIFFERENTIAL METHOD: ABNORMAL
DIFFERENTIAL METHOD: ABNORMAL
DISPENSE STATUS: NORMAL
DISPENSE STATUS: NORMAL
EOSINOPHIL # BLD AUTO: 0 K/UL (ref 0–0.5)
EOSINOPHIL # BLD AUTO: 0.1 K/UL (ref 0–0.5)
EOSINOPHIL NFR BLD: 0.1 % (ref 0–8)
EOSINOPHIL NFR BLD: 0.7 % (ref 0–8)
ERYTHROCYTE [DISTWIDTH] IN BLOOD BY AUTOMATED COUNT: 11.5 % (ref 11.5–14.5)
ERYTHROCYTE [DISTWIDTH] IN BLOOD BY AUTOMATED COUNT: 12.1 % (ref 11.5–14.5)
FINAL PATHOLOGIC DIAGNOSIS: ABNORMAL
HCT VFR BLD AUTO: 23 % (ref 37–48.5)
HCT VFR BLD AUTO: 25.3 % (ref 37–48.5)
HGB BLD-MCNC: 7.3 G/DL (ref 12–16)
HGB BLD-MCNC: 8 G/DL (ref 12–16)
IMM GRANULOCYTES # BLD AUTO: 0.01 K/UL (ref 0–0.04)
IMM GRANULOCYTES # BLD AUTO: 0.04 K/UL (ref 0–0.04)
IMM GRANULOCYTES NFR BLD AUTO: 0.1 % (ref 0–0.5)
IMM GRANULOCYTES NFR BLD AUTO: 0.4 % (ref 0–0.5)
LYMPHOCYTES # BLD AUTO: 1.6 K/UL (ref 1–4.8)
LYMPHOCYTES # BLD AUTO: 2.4 K/UL (ref 1–4.8)
LYMPHOCYTES NFR BLD: 21.1 % (ref 18–48)
LYMPHOCYTES NFR BLD: 26.3 % (ref 18–48)
Lab: ABNORMAL
MCH RBC QN AUTO: 30.5 PG (ref 27–31)
MCH RBC QN AUTO: 30.9 PG (ref 27–31)
MCHC RBC AUTO-ENTMCNC: 31.6 G/DL (ref 32–36)
MCHC RBC AUTO-ENTMCNC: 31.7 G/DL (ref 32–36)
MCV RBC AUTO: 96 FL (ref 82–98)
MCV RBC AUTO: 98 FL (ref 82–98)
MONOCYTES # BLD AUTO: 0.7 K/UL (ref 0.3–1)
MONOCYTES # BLD AUTO: 0.7 K/UL (ref 0.3–1)
MONOCYTES NFR BLD: 7.3 % (ref 4–15)
MONOCYTES NFR BLD: 8.7 % (ref 4–15)
NEUTROPHILS # BLD AUTO: 5.4 K/UL (ref 1.8–7.7)
NEUTROPHILS # BLD AUTO: 6 K/UL (ref 1.8–7.7)
NEUTROPHILS NFR BLD: 65.1 % (ref 38–73)
NEUTROPHILS NFR BLD: 69.9 % (ref 38–73)
NRBC BLD-RTO: 0 /100 WBC
NRBC BLD-RTO: 0 /100 WBC
NUM UNITS TRANS PACKED RBC: NORMAL
PLATELET # BLD AUTO: 155 K/UL (ref 150–450)
PLATELET # BLD AUTO: 205 K/UL (ref 150–450)
PMV BLD AUTO: 8.7 FL (ref 9.2–12.9)
PMV BLD AUTO: 9.4 FL (ref 9.2–12.9)
RBC # BLD AUTO: 2.39 M/UL (ref 4–5.4)
RBC # BLD AUTO: 2.59 M/UL (ref 4–5.4)
TRANS ERYTHROCYTES VOL PATIENT: NORMAL ML
WBC # BLD AUTO: 7.68 K/UL (ref 3.9–12.7)
WBC # BLD AUTO: 9.15 K/UL (ref 3.9–12.7)

## 2022-03-11 PROCEDURE — 99024 POSTOP FOLLOW-UP VISIT: CPT | Mod: ,,, | Performed by: OBSTETRICS & GYNECOLOGY

## 2022-03-11 PROCEDURE — 36415 COLL VENOUS BLD VENIPUNCTURE: CPT | Performed by: STUDENT IN AN ORGANIZED HEALTH CARE EDUCATION/TRAINING PROGRAM

## 2022-03-11 PROCEDURE — P9021 RED BLOOD CELLS UNIT: HCPCS | Performed by: STUDENT IN AN ORGANIZED HEALTH CARE EDUCATION/TRAINING PROGRAM

## 2022-03-11 PROCEDURE — 20600001 HC STEP DOWN PRIVATE ROOM

## 2022-03-11 PROCEDURE — 25000003 PHARM REV CODE 250: Performed by: OBSTETRICS & GYNECOLOGY

## 2022-03-11 PROCEDURE — 85025 COMPLETE CBC W/AUTO DIFF WBC: CPT | Performed by: STUDENT IN AN ORGANIZED HEALTH CARE EDUCATION/TRAINING PROGRAM

## 2022-03-11 PROCEDURE — P9016 RBC LEUKOCYTES REDUCED: HCPCS | Performed by: STUDENT IN AN ORGANIZED HEALTH CARE EDUCATION/TRAINING PROGRAM

## 2022-03-11 PROCEDURE — 99024 PR POST-OP FOLLOW-UP VISIT: ICD-10-PCS | Mod: ,,, | Performed by: OBSTETRICS & GYNECOLOGY

## 2022-03-11 PROCEDURE — 63600175 PHARM REV CODE 636 W HCPCS: Performed by: STUDENT IN AN ORGANIZED HEALTH CARE EDUCATION/TRAINING PROGRAM

## 2022-03-11 PROCEDURE — 86920 COMPATIBILITY TEST SPIN: CPT | Performed by: STUDENT IN AN ORGANIZED HEALTH CARE EDUCATION/TRAINING PROGRAM

## 2022-03-11 PROCEDURE — 36430 TRANSFUSION BLD/BLD COMPNT: CPT

## 2022-03-11 PROCEDURE — 25000003 PHARM REV CODE 250: Performed by: STUDENT IN AN ORGANIZED HEALTH CARE EDUCATION/TRAINING PROGRAM

## 2022-03-11 PROCEDURE — 86901 BLOOD TYPING SEROLOGIC RH(D): CPT | Performed by: STUDENT IN AN ORGANIZED HEALTH CARE EDUCATION/TRAINING PROGRAM

## 2022-03-11 RX ORDER — DIPHENHYDRAMINE HCL 25 MG
25 CAPSULE ORAL EVERY 6 HOURS PRN
Status: DISCONTINUED | OUTPATIENT
Start: 2022-03-11 | End: 2022-03-12 | Stop reason: HOSPADM

## 2022-03-11 RX ORDER — HYDROCODONE BITARTRATE AND ACETAMINOPHEN 500; 5 MG/1; MG/1
TABLET ORAL
Status: DISCONTINUED | OUTPATIENT
Start: 2022-03-11 | End: 2022-03-12 | Stop reason: HOSPADM

## 2022-03-11 RX ORDER — DIPHENHYDRAMINE HYDROCHLORIDE 50 MG/ML
25 INJECTION INTRAMUSCULAR; INTRAVENOUS ONCE
Status: COMPLETED | OUTPATIENT
Start: 2022-03-11 | End: 2022-03-11

## 2022-03-11 RX ADMIN — SODIUM CHLORIDE: 0.9 INJECTION, SOLUTION INTRAVENOUS at 10:03

## 2022-03-11 RX ADMIN — SODIUM CHLORIDE: 0.9 INJECTION, SOLUTION INTRAVENOUS at 03:03

## 2022-03-11 RX ADMIN — OXYCODONE HYDROCHLORIDE 10 MG: 10 TABLET ORAL at 12:03

## 2022-03-11 RX ADMIN — SODIUM CHLORIDE 1000 ML: 0.9 INJECTION, SOLUTION INTRAVENOUS at 08:03

## 2022-03-11 RX ADMIN — ACETAMINOPHEN 650 MG: 325 TABLET ORAL at 12:03

## 2022-03-11 RX ADMIN — ACETAMINOPHEN 650 MG: 325 TABLET ORAL at 11:03

## 2022-03-11 RX ADMIN — SODIUM CHLORIDE 1000 ML: 0.9 INJECTION, SOLUTION INTRAVENOUS at 04:03

## 2022-03-11 RX ADMIN — DIPHENHYDRAMINE HYDROCHLORIDE 25 MG: 25 CAPSULE ORAL at 05:03

## 2022-03-11 RX ADMIN — OXYCODONE HYDROCHLORIDE 10 MG: 10 TABLET ORAL at 04:03

## 2022-03-11 RX ADMIN — OXYCODONE 5 MG: 5 TABLET ORAL at 05:03

## 2022-03-11 RX ADMIN — DIPHENHYDRAMINE HYDROCHLORIDE 25 MG: 50 INJECTION, SOLUTION INTRAMUSCULAR; INTRAVENOUS at 04:03

## 2022-03-11 RX ADMIN — OXYCODONE 5 MG: 5 TABLET ORAL at 09:03

## 2022-03-11 RX ADMIN — ACETAMINOPHEN 650 MG: 325 TABLET ORAL at 06:03

## 2022-03-11 RX ADMIN — ACETAMINOPHEN 650 MG: 325 TABLET ORAL at 04:03

## 2022-03-11 NOTE — RESPIRATORY THERAPY
RAPID RESPONSE RESPIRATORY THERAPY NOTE             Code Status: Full Code   : 1990  Bed: 822/822 A:   MRN: [id number]  Time page Received: 0650  Time Rapid Response RT at Bedside: 0654  Time Rapid Response RT left Bedside: 0700    SITUATION    Evaluated patient for: Blood Trans issue    BACKGROUND    Why is the patient in the hospital?: S/P unilateral salpingo-oophorectomy    Patient has a past medical history of Anemia and S/P unilateral salpingo-oophorectomy/mass resection.    24 Hours Vitals Range:  Temp:  [97.3 °F (36.3 °C)-98.8 °F (37.1 °C)]   Pulse:  []   Resp:  [14-20]   BP: ()/(36-78)   SpO2:  [92 %-100 %]     Labs:    No results for input(s): NA, K, CL, CO2, CREATININE, GLU, PHOS, MG in the last 72 hours.    Invalid input(s): CMP,  BUN, TBIL     No results for input(s): PH, PCO2, PO2, HCO3, POCSATURATED, BE in the last 72 hours.    ASSESSMENT/INTERVENTIONS    Upon arrival in room Pt resting comfortably with no respiratory needs at this time    Last Vitals: Temp: 97.9 °F (36.6 °C) (707)  Pulse: 95 (707)  Resp: 16 (707)  BP: 84/56 (707)  SpO2: 94 % (707)  Level of Consciousness: Level of Consciousness (AVPU): alert  Respiratory Effort: Respiratory Effort: Normal, Unlabored  Expansion/Accessory Muscle Usage: Expansion/Accessory Muscles/Retractions: expansion symmetric, no retractions, no use of accessory muscles  All Lung Field Breath Sounds:    O2 Device/Concentration: Flow (L/min): 0,  , O2 Device (Oxygen Therapy): room air  NIPPV: No Surgical airway: No Vent: No  ETCO2 monitored:    Ambu at bedside:      Active Orders   Respiratory Care    Oxygen Continuous     Frequency: Continuous     Number of Occurrences: Until Specified     Order Comments: Discontinue when Sp02 is greater than or equal to 95% of equal to Preop Sp02       Order Questions:      Device type: Low flow      Device: Simple Face Mask      Titrate O2 per Oxygen Titration Protocol: Yes       Notify MD of: Inability to achieve desired SpO2    Pulse Oximetry Q4H     Frequency: Q4H     Number of Occurrences: Until Specified       RECOMMENDATIONS  ?  We recommend: RRT Recs: Continue POC per primary team.    ESCALATION        FOLLOW-UP    Disposition:     Please call back the Rapid Response RT, Grady Krishna RRT at x [phone] for any questions or concerns.

## 2022-03-11 NOTE — NURSING TRANSFER
Nursing Transfer Note      3/10/2022     Reason patient is being transferred: Observation    Transfer To: 822    Transfer via stretcher    Transfer with cardiac monitoring, N/A    Transported by RN    Medicines sent: N/A    Any special needs or follow-up needed: No    Chart send with patient: Yes    Notified: spouse    Patient reassessed at: 2000 on 3/10/22

## 2022-03-11 NOTE — PROGRESS NOTES
Tracy Berman MD made aware of current vital signs and at least a 20-30 minute delay prior to getting blood product from blood bank at this time.  Tracy Berman MD gave TORB to bolus patient if blood product was delayed past 0700.  Rapid response made aware  of current vitals and awaiting blood transfusion.  Will continue to monitor.

## 2022-03-11 NOTE — PLAN OF CARE
Patient progressing towards discharge.    Patient had no acute events noted.  Blood pressure noted being low while patient sleeping at 80/50.  Patient still able to ambulate with only mild dizziness.  Blood pressure rechecked after ambulation and noted being higher.  Pain controlled with oxycodone as ordered.  Adequate urine output overnight.  Discussed POC with patient and family with verbalization of understanding.    Will continue to monitor.

## 2022-03-11 NOTE — PLAN OF CARE
"MD Berman paged about patient trying to stand up and feeling "dizzy, light-headed, and like she was going to pass out." I explained that this is the second time this event has happened since the patient has been in recovery. Blood pressure dropped during both events (see flow sheets) but it came back up. Patient seemed pale and stated "I don't feel good." Patient was placed back in stretcher and vital signs were taken. MD Berman ordered Meclizine to help with dizziness and instructed to give the patient more time and to let her know how she does. Continuing to monitor patient.   "

## 2022-03-11 NOTE — PLAN OF CARE
Side rails up x2; call bell in place; bed in lowest, locked position; skid proof socks on; no evidence of skin breakdown; care plan explained to patient; pt remains free of injury.  Pt tolerated po, voids, up to BR with standby assist, passed gas. Pt with c/o pain oxy IR 5 mg po given x 2. One unit of PRBCs completed and one is infusing. Pt received one liter NS bolus, NS IVF infused at 125 cc/hr. Pt encouraged to call as needed. VSS and afebrile.

## 2022-03-11 NOTE — SUBJECTIVE & OBJECTIVE
Interval History: NAEON, although BP continues to be soft. Patient resting comfortably in bed this morning, states she feels better compared to last night. She says that her pain is improved as well. She says that she ate dinner without nausea or vomiting. Has been ambulating to the bathroom and urinating without difficulty.     Scheduled Meds:   acetaminophen  650 mg Oral Q6H     Continuous Infusions:   sodium chloride 0.9%      sodium chloride 0.9% 125 mL/hr at 03/11/22 0400     PRN Meds:sodium chloride, diphenhydrAMINE, HYDROmorphone, ondansetron, oxyCODONE, oxyCODONE, promethazine, sodium chloride 0.9%    Review of patient's allergies indicates:  No Known Allergies    Objective:     Vital Signs (Most Recent):  Temp: 98.8 °F (37.1 °C) (03/11/22 0345)  Pulse: 91 (03/11/22 0541)  Resp: 18 (03/11/22 0404)  BP: (!) 86/62 (03/11/22 0541)  SpO2: 97 % (03/11/22 0345)   Vital Signs (24h Range):  Temp:  [97.3 °F (36.3 °C)-98.8 °F (37.1 °C)] 98.8 °F (37.1 °C)  Pulse:  [69-97] 91  Resp:  [14-20] 18  SpO2:  [97 %-100 %] 97 %  BP: ()/(36-78) 86/62     Weight: 49.9 kg (110 lb)  Body mass index is 20.78 kg/m².    Intake/Output - Last 3 Shifts         03/09 0700  03/10 0659 03/10 0700  03/11 0659    P.O.  900    I.V. (mL/kg)  1106.6 (22.2)    IV Piggyback  2000    Total Intake(mL/kg)  4006.6 (80.3)    Urine (mL/kg/hr)  1000    Total Output  1000    Net  +3006.6          Urine Occurrence  1 x               Physical Exam:   Constitutional: She is oriented to person, place, and time. She appears well-developed and well-nourished. No distress.    HENT:   Head: Normocephalic and atraumatic.    Eyes: Pupils are equal, round, and reactive to light. Conjunctivae and EOM are normal.     Cardiovascular:  Normal rate, regular rhythm and normal heart sounds.             Pulmonary/Chest: Effort normal and breath sounds normal. No respiratory distress.        Abdominal: Soft. She exhibits distension (Moderately distended) and abdominal  incision (Dressing over minilap site c/d/i). There is abdominal tenderness (Mild tenderness to palpation). There is no rebound and no guarding.             Musculoskeletal: Moves all extremeties. No tenderness or edema.       Neurological: She is alert and oriented to person, place, and time.    Skin: Skin is warm and dry. No rash noted. She is not diaphoretic.    Psychiatric: Her behavior is normal.     Lines/Drains/Airways       Peripheral Intravenous Line  Duration                  Peripheral IV - Single Lumen 03/10/22 0937 18 G Anterior;Proximal;Right Forearm <1 day                    Laboratory:  Recent Labs   Lab 03/04/22  1000 03/10/22  1926 03/11/22  0254   WBC 5.18 11.06 7.68   HGB 12.4 8.7* 7.3*   HCT 39.2 27.1* 23.0*   MCV 96 96 96    226 205

## 2022-03-11 NOTE — ASSESSMENT & PLAN NOTE
- See H/H trend as above  - Patient with weakness and dizziness in PACU last night, reports she is feeling better since then.  - Has been persistently hypotensive but never tachycardic. UOP has been adequate.  - Abdominal exam as expected for POD #1  - Given significant drop in H/H and persistent hypotension, will transfuse 1 u pRBCs and continue to monitor closely.

## 2022-03-11 NOTE — HOSPITAL COURSE
3/10/2022 - To OR for planned procedure. No complications. Some hypotension and dizziness postop, decision made to admit to observation. Post-op H/H 8.7/26, down from 12/37 one week prior.   03/12/2022 - POD#2, s/p 2u prBC due to acute H/H drop to 7/23,with appropraite rise to 9.5/30 this AM. Stable for discharge.

## 2022-03-11 NOTE — CODE/ RAPID DOCUMENTATION
RAPID RESPONSE NURSE NOTE        Admit Date: 3/10/2022  LOS: 1  Code Status: Full Code   Date of Consult: 2022  : 1990  Age: 31 y.o.  Weight:   Wt Readings from Last 1 Encounters:   03/10/22 49.9 kg (110 lb)     Sex: female  Race:    Bed: [address] [place] [address] A:   MRN: [id number]  Time Rapid Response Team page Received: 0648  Time Rapid Response Team at Bedside: 0651  Time Rapid Response Team left Bedside: 0700  Was the patient discharged from an ICU this admission? No   Was the patient discharged from a PACU within last 24 hours? Yes   Did the patient receive conscious sedation/general anesthesia in last 24 hours? No  Was the patient in the ED within the past 24 hours? No  Was the patient on NIPPV within the past 24 hours? No   Did this progress into an ARC or CPA: no  Attending Physician: Charmaine Delacruz MD  Primary Service: Gynecologic Oncology       SITUATION    Notified by code pager.  Reason for alert: Hypotension  Called to evaluate the patient for Circulatory    BACKGROUND     Why is the patient in the hospital?: S/P unilateral salpingo-oophorectomy    Patient has a past medical history of Anemia and S/P unilateral salpingo-oophorectomy/mass resection.    Last Vitals:  Temp: 98.8 °F (37.1 °C) (652)  Pulse: 110 (652)  Resp: 18 (652)  BP: 97/59 (652)  SpO2: 96 % (652)    24 Hours Vitals Range:  Temp:  [97.3 °F (36.3 °C)-98.8 °F (37.1 °C)]   Pulse:  []   Resp:  [14-20]   BP: ()/(36-78)   SpO2:  [92 %-100 %]     Labs:  Recent Labs     03/10/22  1926 22  0254   WBC 11.06 7.68   HGB 8.7* 7.3*   HCT 27.1* 23.0*    205       No results for input(s): NA, K, CL, CO2, CREATININE, GLU, PHOS, MG in the last 72 hours.    Invalid input(s): CMP,  BUN, TBIL     No results for input(s): PH, PCO2, PO2, HCO3, POCSATURATED, BE in the last 72 hours.     ASSESSMENT    Physical Exam  Constitutional:       Appearance: Normal appearance.   HENT:      Head:  Normocephalic.      Nose: Nose normal.   Eyes:      General: Lids are normal.      Pupils: Pupils are equal, round, and reactive to light.   Cardiovascular:      Rate and Rhythm: Tachycardia present.   Pulmonary:      Effort: Pulmonary effort is normal.   Abdominal:      General: Abdomen is flat.   Neurological:      General: No focal deficit present.      Mental Status: She is alert and oriented to person, place, and time.      GCS: GCS eye subscore is 4. GCS verbal subscore is 5. GCS motor subscore is 6.      Motor: Motor function is intact.   Psychiatric:         Mood and Affect: Mood normal.         INTERVENTIONS    2L of fluid were given and a unit of blood was being started. BP stable MAP of 72 on arrival.     RECOMMENDATIONS    We recommend: 1 unit RBC, repeat CBC, 1 more unit if still symptomatic    PROVIDER ESCALATION    Orders received and case discussed with NA.    Disposition: Remain in room 822.    FOLLOW UP    Charge Eliz BISWAS updated on plan of care. Instructed to call the Rapid Response Nurse, Flynn Valdivia RN at [phone] for additional questions or concerns.

## 2022-03-11 NOTE — PROGRESS NOTES
[facility] ([facility])  Gynecologic Oncology  Progress Note      Patient Name: Francis Nagel  MRN: [id number]  Admission Date: 3/10/2022  Hospital Length of Stay: 1 days  Attending Provider: Charmaine Delacruz MD  Primary Care Provider: Primary Doctor No  Principal Problem: S/P unilateral salpingo-oophorectomy    Follow-up For: Procedure(s) (LRB):  XI ROBOTIC SALPINGO-OOPHORECTOMY/ USO (Left)  Post-Operative Day: 1 Day Post-Op  Subjective:      History of Present Illness:  Francis Nagel is a 31 y.o. F who presents for scheduled RA-USO due to an adnexal mass.      Hospital Course:  3/10/2022 - To OR for planned procedure. No complications. Some hypotension and dizziness postop, decision made to admit to observation. Post-op H/H 8.7/26, down from 12/37 one week prior.       Interval History: NAEON, although BP continues to be soft. Patient resting comfortably in bed this morning, states she feels better compared to last night. She says that her pain is improved as well. She says that she ate dinner without nausea or vomiting. Has been ambulating to the bathroom and urinating without difficulty.     Scheduled Meds:   acetaminophen  650 mg Oral Q6H     Continuous Infusions:   sodium chloride 0.9%      sodium chloride 0.9% 125 mL/hr at 03/11/22 0400     PRN Meds:sodium chloride, diphenhydrAMINE, HYDROmorphone, ondansetron, oxyCODONE, oxyCODONE, promethazine, sodium chloride 0.9%    Review of patient's allergies indicates:  No Known Allergies    Objective:     Vital Signs (Most Recent):  Temp: 98.8 °F (37.1 °C) (03/11/22 0345)  Pulse: 91 (03/11/22 0541)  Resp: 18 (03/11/22 0404)  BP: (!) 86/62 (03/11/22 0541)  SpO2: 97 % (03/11/22 0345)   Vital Signs (24h Range):  Temp:  [97.3 °F (36.3 °C)-98.8 °F (37.1 °C)] 98.8 °F (37.1 °C)  Pulse:  [69-97] 91  Resp:  [14-20] 18  SpO2:  [97 %-100 %] 97 %  BP: ()/(36-78) 86/62     Weight: 49.9 kg (110 lb)  Body mass index is 20.78 kg/m².    Intake/Output - Last 3  Shifts         03/09 0700  03/10 0659 03/10 0700 03/11 0659    P.O.  900    I.V. (mL/kg)  1106.6 (22.2)    IV Piggyback  2000    Total Intake(mL/kg)  4006.6 (80.3)    Urine (mL/kg/hr)  1000    Total Output  1000    Net  +3006.6          Urine Occurrence  1 x               Physical Exam:   Constitutional: She is oriented to person, place, and time. She appears well-developed and well-nourished. No distress.    HENT:   Head: Normocephalic and atraumatic.    Eyes: Pupils are equal, round, and reactive to light. Conjunctivae and EOM are normal.     Cardiovascular:  Normal rate, regular rhythm and normal heart sounds.             Pulmonary/Chest: Effort normal and breath sounds normal. No respiratory distress.        Abdominal: Soft. She exhibits distension (Moderately distended) and abdominal incision (Dressing over minilap site c/d/i). There is abdominal tenderness (Mild tenderness to palpation). There is no rebound and no guarding.             Musculoskeletal: Moves all extremeties. No tenderness or edema.       Neurological: She is alert and oriented to person, place, and time.    Skin: Skin is warm and dry. No rash noted. She is not diaphoretic.    Psychiatric: Her behavior is normal.     Lines/Drains/Airways       Peripheral Intravenous Line  Duration                  Peripheral IV - Single Lumen 03/10/22 0937 18 G Anterior;Proximal;Right Forearm <1 day                    Laboratory:  Recent Labs   Lab 03/04/22  1000 03/10/22  1926 03/11/22  0254   WBC 5.18 11.06 7.68   HGB 12.4 8.7* 7.3*   HCT 39.2 27.1* 23.0*   MCV 96 96 96    226 205          Assessment/Plan:     * S/P unilateral salpingo-oophorectomy/mass resection  - Procedure complications: None  - IVF @ 125 cc/h; also got 1 L bolus this morning  - Diet: regular  - Pain control: Scheduled tylenol, oxy IR 5/10 PRN with IV dilaudid for breakthrough pain  - In/Out: 1000 cc (about 1.3 cc/kg/hr) out since surgery  - Bowel function: -flatus/-BM   - PRN:  "simethicone, zofran, phenergan, benadryl  - Heme: See "anemia"  - PPX: ambulation encouraged, IS encouraged, TEDs/SCDs in place    Disposition: Will plan to evaluate the patient as she meets her post-operative milestones.      Anemia  - See H/H trend as above  - Patient with weakness and dizziness in PACU last night, reports she is feeling better since then.  - Has been persistently hypotensive but never tachycardic. UOP has been adequate.  - Abdominal exam as expected for POD #1  - Given significant drop in H/H and persistent hypotension, will transfuse 1 u pRBCs and continue to monitor closely.      VTE Risk Mitigation (From admission, onward)         Ordered     Place KAREN hose  Until discontinued         03/10/22 0916     Place sequential compression device  Until discontinued         03/10/22 0916                Was gomez catheter removed? Yes    Tracy Berman MD  Gynecologic Oncology  [facility] - Oncology ([facility])  "

## 2022-03-11 NOTE — ASSESSMENT & PLAN NOTE
"- Procedure complications: None  - IVF @ 125 cc/h; also got 1 L bolus this morning  - Diet: regular  - Pain control: Scheduled tylenol, oxy IR 5/10 PRN with IV dilaudid for breakthrough pain  - In/Out: 1000 cc (about 1.3 cc/kg/hr) out since surgery  - Bowel function: -flatus/-BM   - PRN: simethicone, zofran, phenergan, benadryl  - Heme: See "anemia"  - PPX: ambulation encouraged, IS encouraged, TEDs/SCDs in place    Disposition: Will plan to evaluate the patient as she meets her post-operative milestones.    "

## 2022-03-11 NOTE — HPI
Francis Tracyjose roberto is a 31 y.o. F who presents for scheduled RA-USO due to an adnexal mass.

## 2022-03-11 NOTE — PROGRESS NOTES
Tracy Berman MD made aware of current vital signs and hgb of 7.3.  Received TORB for 1 L Bolus Normal Saline at this time.  Rapid response made aware of current vital signs trend and labs.  Will continue to monitor.

## 2022-03-12 VITALS
TEMPERATURE: 98 F | WEIGHT: 110 LBS | HEIGHT: 61 IN | DIASTOLIC BLOOD PRESSURE: 72 MMHG | OXYGEN SATURATION: 95 % | RESPIRATION RATE: 16 BRPM | HEART RATE: 73 BPM | SYSTOLIC BLOOD PRESSURE: 109 MMHG | BODY MASS INDEX: 20.77 KG/M2

## 2022-03-12 LAB
BASOPHILS # BLD AUTO: 0.01 K/UL (ref 0–0.2)
BASOPHILS NFR BLD: 0.2 % (ref 0–1.9)
DIFFERENTIAL METHOD: ABNORMAL
EOSINOPHIL # BLD AUTO: 0.1 K/UL (ref 0–0.5)
EOSINOPHIL NFR BLD: 2.2 % (ref 0–8)
ERYTHROCYTE [DISTWIDTH] IN BLOOD BY AUTOMATED COUNT: 12.9 % (ref 11.5–14.5)
HCT VFR BLD AUTO: 30.2 % (ref 37–48.5)
HGB BLD-MCNC: 9.5 G/DL (ref 12–16)
IMM GRANULOCYTES # BLD AUTO: 0.01 K/UL (ref 0–0.04)
IMM GRANULOCYTES NFR BLD AUTO: 0.2 % (ref 0–0.5)
LYMPHOCYTES # BLD AUTO: 2.5 K/UL (ref 1–4.8)
LYMPHOCYTES NFR BLD: 39.6 % (ref 18–48)
MCH RBC QN AUTO: 29.7 PG (ref 27–31)
MCHC RBC AUTO-ENTMCNC: 31.5 G/DL (ref 32–36)
MCV RBC AUTO: 94 FL (ref 82–98)
MONOCYTES # BLD AUTO: 0.5 K/UL (ref 0.3–1)
MONOCYTES NFR BLD: 7.7 % (ref 4–15)
NEUTROPHILS # BLD AUTO: 3.2 K/UL (ref 1.8–7.7)
NEUTROPHILS NFR BLD: 50.1 % (ref 38–73)
NRBC BLD-RTO: 0 /100 WBC
PLATELET # BLD AUTO: 171 K/UL (ref 150–450)
PMV BLD AUTO: 9.3 FL (ref 9.2–12.9)
RBC # BLD AUTO: 3.2 M/UL (ref 4–5.4)
WBC # BLD AUTO: 6.34 K/UL (ref 3.9–12.7)

## 2022-03-12 PROCEDURE — 36415 COLL VENOUS BLD VENIPUNCTURE: CPT | Performed by: STUDENT IN AN ORGANIZED HEALTH CARE EDUCATION/TRAINING PROGRAM

## 2022-03-12 PROCEDURE — 94761 N-INVAS EAR/PLS OXIMETRY MLT: CPT

## 2022-03-12 PROCEDURE — 85025 COMPLETE CBC W/AUTO DIFF WBC: CPT | Performed by: STUDENT IN AN ORGANIZED HEALTH CARE EDUCATION/TRAINING PROGRAM

## 2022-03-12 PROCEDURE — 63600175 PHARM REV CODE 636 W HCPCS: Performed by: STUDENT IN AN ORGANIZED HEALTH CARE EDUCATION/TRAINING PROGRAM

## 2022-03-12 PROCEDURE — 25000003 PHARM REV CODE 250: Performed by: STUDENT IN AN ORGANIZED HEALTH CARE EDUCATION/TRAINING PROGRAM

## 2022-03-12 RX ORDER — FUROSEMIDE 10 MG/ML
20 INJECTION INTRAMUSCULAR; INTRAVENOUS ONCE
Status: COMPLETED | OUTPATIENT
Start: 2022-03-12 | End: 2022-03-12

## 2022-03-12 RX ADMIN — ACETAMINOPHEN 650 MG: 325 TABLET ORAL at 05:03

## 2022-03-12 RX ADMIN — OXYCODONE 5 MG: 5 TABLET ORAL at 08:03

## 2022-03-12 RX ADMIN — ACETAMINOPHEN 650 MG: 325 TABLET ORAL at 11:03

## 2022-03-12 RX ADMIN — OXYCODONE 5 MG: 5 TABLET ORAL at 05:03

## 2022-03-12 RX ADMIN — OXYCODONE HYDROCHLORIDE 10 MG: 10 TABLET ORAL at 02:03

## 2022-03-12 RX ADMIN — SODIUM CHLORIDE: 0.9 INJECTION, SOLUTION INTRAVENOUS at 06:03

## 2022-03-12 RX ADMIN — FUROSEMIDE 20 MG: 10 INJECTION, SOLUTION INTRAVENOUS at 03:03

## 2022-03-12 NOTE — ASSESSMENT & PLAN NOTE
- See H/H trend as above, s/p 2u pRBC with appropriate rise in H/H  - Patient with weakness and dizziness in PACU last night, reports she is feeling better since then.  - Has been persistently hypotensive but never tachycardic. UOP has been adequate.  - Abdominal exam as expected for POD #2

## 2022-03-12 NOTE — PROGRESS NOTES
[facility] - Oncology ([facility])  Gynecologic Oncology  Progress Note      Patient Name: Francis Nagel  MRN: [id number]  Admission Date: 3/10/2022  Hospital Length of Stay: 2 days  Attending Provider: Charmaine Delacruz MD  Primary Care Provider: Primary Doctor No  Principal Problem: S/P unilateral salpingo-oophorectomy    Follow-up For: Procedure(s) (LRB):  XI ROBOTIC SALPINGO-OOPHORECTOMY/ USO (Left)  Post-Operative Day: 2 Days Post-Op  Subjective:      History of Present Illness:  Francis Nagel is a 31 y.o. F who presents for scheduled RA-USO due to an adnexal mass.      Hospital Course:  3/10/2022 - To OR for planned procedure. No complications. Some hypotension and dizziness postop, decision made to admit to observation. Post-op H/H 8.7/26, down from 12/37 one week prior.   03/12/2022 - POD#2, s/p 2u prBC due to acute H/H drop to 7/23,with appropraite rise to 9.5/30 this AM. Stable for discharge.       Interval History: NAEON, BP maintained 110s/70s. Patient resting comfortably in bed this morning, states she feels good and pain is controlled. Tolerating regular diet. Has been ambulating to the bathroom and urinating without difficulty.    Scheduled Meds:   acetaminophen  650 mg Oral Q6H     Continuous Infusions:   sodium chloride 0.9%      sodium chloride 0.9% 125 mL/hr at 03/12/22 0636     PRN Meds:sodium chloride, sodium chloride, diphenhydrAMINE, HYDROmorphone, ondansetron, oxyCODONE, oxyCODONE, promethazine, sodium chloride 0.9%    Review of patient's allergies indicates:  No Known Allergies    Objective:     Vital Signs (Most Recent):  Temp: 98.2 °F (36.8 °C) (03/12/22 0751)  Pulse: 84 (03/12/22 0751)  Resp: 16 (03/12/22 0751)  BP: 113/71 (03/12/22 0751)  SpO2: 96 % (03/12/22 0751)   Vital Signs (24h Range):  Temp:  [97.6 °F (36.4 °C)-98.9 °F (37.2 °C)] 98.2 °F (36.8 °C)  Pulse:  [] 84  Resp:  [16-19] 16  SpO2:  [93 %-99 %] 96 %  BP: (100-113)/(64-77) 113/71     Weight: 49.9 kg (110 lb)  Body  mass index is 20.78 kg/m².    Intake/Output - Last 3 Shifts         03/10 0700  03/11 0659 03/11 0700 03/12 0659 03/12 0700  03/13 0659    P.O. 900 840     I.V. (mL/kg) 1106.6 (22.2) 269.7 (5.4)     Blood 261 595     IV Piggyback 2000      Total Intake(mL/kg) 4267.6 (85.5) 1704.7 (34.2)     Urine (mL/kg/hr) 1000 1500 (1.3)     Total Output 1000 1500     Net +3267.6 +204.7            Urine Occurrence 1 x  1 x               Physical Exam:   Constitutional: She is oriented to person, place, and time. She appears well-developed and well-nourished. No distress.    HENT:   Head: Normocephalic and atraumatic.    Eyes: Pupils are equal, round, and reactive to light. Conjunctivae and EOM are normal.     Cardiovascular:  Normal rate, regular rhythm and normal heart sounds.             Pulmonary/Chest: Effort normal and breath sounds normal. No respiratory distress.        Abdominal: Soft. She exhibits abdominal incision (Dressing over minilap site c/d/i). She exhibits no distension. There is abdominal tenderness (Mild tenderness to palpation). There is no rebound and no guarding.             Musculoskeletal: Moves all extremeties. No tenderness or edema.       Neurological: She is alert and oriented to person, place, and time.    Skin: Skin is warm and dry. No rash noted. She is not diaphoretic.    Psychiatric: Her behavior is normal.     Lines/Drains/Airways       Peripheral Intravenous Line  Duration                  Peripheral IV - Single Lumen 03/10/22 0937 18 G Anterior;Proximal;Right Forearm 1 day                    Laboratory:  Recent Labs   Lab 03/11/22  0254 03/11/22  1226 03/12/22  0307   WBC 7.68 9.15 6.34   HGB 7.3* 8.0* 9.5*   HCT 23.0* 25.3* 30.2*   MCV 96 98 94    155 171            Assessment/Plan:     * S/P unilateral salpingo-oophorectomy/mass resection  - Procedure complications: None  - d/c IVF   - Diet: regular  - Pain control: Scheduled tylenol, oxy IR 5/10 PRN with IV dilaudid for breakthrough  "pain  - In/Out: UOP appropriate   - Bowel function: -flatus/-BM   - PRN: simethicone, zofran, phenergan, benadryl  - Heme: See "anemia"  - PPX: ambulation encouraged, IS encouraged, TEDs/SCDs in place    Disposition: Will plan to evaluate the patient as she meets her post-operative milestones.      Anemia  - See H/H trend as above, s/p 2u pRBC with appropriate rise in H/H  - Patient with weakness and dizziness in PACU last night, reports she is feeling better since then.  - Has been persistently hypotensive but never tachycardic. UOP has been adequate.  - Abdominal exam as expected for POD #2      VTE Risk Mitigation (From admission, onward)         Ordered     Place KAREN hose  Until discontinued         03/10/22 0916     Place sequential compression device  Until discontinued         03/10/22 0916                Was gomez catheter removed? Yes    Diane Mccartney MD  Gynecologic Oncology  [facility] - Oncology ([facility])  "

## 2022-03-12 NOTE — PLAN OF CARE
Blood transfusion completed no reactions. Blood pressure within normal limit afebrile. Voiding ambulated to bathroom with assistance. Patient medicated for pain with Oxy X 2. Hourly rounding completed. Bed locked and in low position, call light with patient. Advised patient to call for help as needed. Plan of care reviewed. TM

## 2022-03-12 NOTE — NURSING
Dr Mccartney's notified pt with 02 sats 80-81 % on RA, pt placed on 2 L NC she was 87%. 3L NC 98%. Levels checked  X 2. Pt remains 02 3 L at this time 02 sats 98%. RR wnl, pt has c/o feeling sob and HA. MD stated she would place order for CXR. Charge nurse also aware.

## 2022-03-12 NOTE — DISCHARGE SUMMARY
[facility] - [facility] ([facility])  Gynecologic Oncology  Discharge Summary    Patient Name: Francis Nagel  MRN: [id number]  Admission Date: 3/10/2022  Hospital Length of Stay: 2 days  Discharge Date and Time:  03/12/2022 9:02 AM  Attending Physician: Charmaine Delacruz MD   Discharging Provider: Diane Mccartney MD  Primary Care Provider: Primary Doctor No    HPI:   Francis Nagel is a 31 y.o. F who presents for scheduled RA-USO due to an adnexal mass.      Hospital Course:  3/10/2022 - To OR for planned procedure. No complications. Some hypotension and dizziness postop, decision made to admit to observation. Post-op H/H 8.7/26, down from 12/37 one week prior.   03/12/2022 - POD#2, s/p 2u prBC due to acute H/H drop to 7/23,with appropraite rise to 9.5/30 this AM. Stable for discharge.       Goals of Care Treatment Preferences:  Code Status: Full Code      Procedure(s) (LRB):  XI ROBOTIC SALPINGO-OOPHORECTOMY/ USO (Left)         Significant Diagnostic Studies: Labs:   CBC   Recent Labs   Lab 03/11/22  0254 03/11/22  1226 03/12/22  0307   WBC 7.68 9.15 6.34   HGB 7.3* 8.0* 9.5*   HCT 23.0* 25.3* 30.2*    155 171       Pending Diagnostic Studies:     Procedure Component Value Units Date/Time    Specimen to Pathology, Surgery Gynecology and Obstetrics [[id number]] Collected: 03/10/22 1322    Order Status: Sent Lab Status: In process Updated: 03/10/22 1621        Final Active Diagnoses:    Diagnosis Date Noted POA    PRINCIPAL PROBLEM:  S/P unilateral salpingo-oophorectomy/mass resection [Z90.721] 03/10/2022 Not Applicable    Anemia [D64.9] 03/11/2022 No      Problems Resolved During this Admission:        Does this patient meet criteria for extended DVT prophylaxis? No, because benign pathology, MIS     Discharged Condition: good    Disposition: Home or Self Care    Follow Up:   Follow-up Information     Charmaine Delacruz MD Follow up in 2 week(s).    Specialty: Gynecologic Oncology  Why: Post-Op Follow  Up  Contact information:  [address]  [facility] [address]  [phone]                       Patient Instructions:      Comprehensive Metabolic Panel   Standing Status: Future Number of Occurrences: 1 Standing Exp. Date: 04/24/23     CBC Without Differential   Standing Status: Future Number of Occurrences: 1 Standing Exp. Date: 04/24/23     Diet Adult Regular     Diet general     No driving until:   Order Comments: No driving while taking narcotics     Pelvic Rest   Order Comments: Nothing in vagina for two weeks     Notify your health care provider if you experience any of the following:  temperature >100.4     Notify your health care provider if you experience any of the following:  persistent nausea and vomiting or diarrhea     Notify your health care provider if you experience any of the following:  redness, tenderness, or signs of infection (pain, swelling, redness, odor or green/yellow discharge around incision site)     Notify your health care provider if you experience any of the following:   Order Comments: Vaginal bleeding greater than 2 saturated pads in 1 hour.     Lifting restrictions   Order Comments: No lifting greater than 15 pounds for six weeks.     Other restrictions (specify):   Order Comments: PELVIC REST:  No douching, tampons, or intercourse for 6 weeks.    If prescribed, vaginal estrogen cream may be used during the postoperative period.     DRIVING:  No driving while on narcotics. Driving may be resumed initially with a competent passenger one to two weeks after surgery if no longer taking narcotics.     EXERCISE:  For six weeks your exercise should be limited to walking. You may walk as far as you wish, as long as you increase your level of exertion gradually and avoid slippery surfaces. You may climb stairs as needed to get around, but should not use stair climbing for exercise.     Remove dressing in 24 hours   Order Comments: If you have a bandage on wound, you may remove it  the day after dismissal.  If you had steri-strips remove them once they begin to peel off (usually 2 weeks). Keep incision clean and dry.  Inspect the incision daily for signs and symptoms of infection.     Wound care routine (specify)   Order Comments: WOUND CARE:  If you have a band-aid or bandage on your wound, you may remove it the day after dismissal.  You may wash the wound with mild soap and water.   You may shower at any time but should avoid immersing any abdominal incisions in water for at least two weeks after surgery or until the wound is completely healed. If given, please shower with Hibiclens soap until bottle is completely finished. Keep your wound clean and dry.  You should observe your incision for signs of infection which include redness, warmth, drainage or fever.     Call MD for:  temperature >100.4     Call MD for:  persistent nausea and vomiting     Call MD for:  severe uncontrolled pain     Call MD for:  difficulty breathing, headache or visual disturbances     Call MD for:  redness, tenderness, or signs of infection (pain, swelling, redness, odor or green/yellow discharge around incision site)     Call MD for:  hives     Call MD for:   Order Comments: inability to void,urine is ketchup colored or you have large clots, vaginal bleeding is heavier than a period.    VAGINAL DISCHARGE: You may develop a vaginal discharge and intermittent vaginal spotting after surgery and up to 6 weeks postoperatively.  The discharge may have an odor and may change in color but it is normal.  This is due to dissolving stiches.  Contact your surgical team if you develop vaginal or vulvar irritation along with a discharge.  Also contact your surgical team if you have vaginal discharge that smells like urine or stool.    CONSTIPATION REMEDIES: Patients are often constipated after surgery or with use of oral narcotic medicine. You should continue to take the stool softener, Senokot-S during the next six weeks, and  consume adequate amounts of water.  If you have not had a bowel movement for 3 days after dismissal, or are uncomfortable and unable to pass stool, please try one or all of the following measures:  1.  Milk of Magnesia - 30 cc by mouth every 12 hours   2.  Dulcolax suppository - One suppository per rectum every 4-6 hours   3.  Metamucil, Fibercon or other bulk former - use as directed  4.  Fleets Enema        PAIN MEDICATIONS:     Take your pain medications as instructed. It is best to take pain medications before your pain becomes severe. This will allow you to take less medication yet have better pain relief. For the first 2 or 3 days it may be helpful to take your pain medications on a regular schedule (e.g. every 4 to 6 hours). This will help you to keep your pain under better control. You should then begin to take fewer medications each day until you no longer need them. Do not take pain medication on an empty stomach. This may lead to nausea and vomiting.     Type & Screen   Standing Status: Future Number of Occurrences: 1 Standing Exp. Date: 04/24/23     Activity as tolerated     Activity as tolerated   Order Comments: Return to normal activity slowly as you feel able.  For 6 weeks your exercise should be limited to walking.  You may walk as far as you wish, as long as you increase your level of exertion gradually and avoid slippery surfaces.    If you had a hysterectomy at the surgery do not insert anything in your vagina for 9 weeks.     Shower on day dressing removed (No bath)   Order Comments: You may shower at any time but should avoid immersing any abdominal incisions in water for at least 2 weeks after surgery or until the wound is completely healed.  If given, please shower with Hibaclens soap until bottle is completely finish     Medications:  Reconciled Home Medications:      Medication List      START taking these medications    8 HOUR PAIN RELIEVER 650 MG Tbsr  Generic drug: acetaminophen  Take 1  tablet (650 mg total) by mouth every 6 to 8 hours as needed (Pain). Alternate every 3 hours with ibuprofen.     HYDROcodone-acetaminophen 5-325 mg per tablet  Commonly known as: NORCO  Take 1 tablet by mouth every 6 (six) hours as needed for Pain.        CHANGE how you take these medications    ibuprofen 600 MG tablet  Commonly known as: ADVIL,MOTRIN  Take 1 tablet (600 mg total) by mouth every 6 (six) hours as needed for Pain. Alternate every 3 hours with tylenol  What changed:   · medication strength  · how much to take  · additional instructions        CONTINUE taking these medications    fexofenadine 180 MG tablet  Commonly known as: ALLEGRA  Take 180 mg by mouth once daily.            Diane Mccartney MD  Gynecologic Oncology  [facility] - Oncology ([facility])

## 2022-03-12 NOTE — ASSESSMENT & PLAN NOTE
"- Procedure complications: None  - d/c IVF   - Diet: regular  - Pain control: Scheduled tylenol, oxy IR 5/10 PRN with IV dilaudid for breakthrough pain  - In/Out: UOP appropriate   - Bowel function: -flatus/-BM   - PRN: simethicone, zofran, phenergan, benadryl  - Heme: See "anemia"  - PPX: ambulation encouraged, IS encouraged, TEDs/SCDs in place    Disposition: Will plan to evaluate the patient as she meets her post-operative milestones.    "

## 2022-03-12 NOTE — SUBJECTIVE & OBJECTIVE
Interval History: NAEON, BP maintained 110s/70s. Patient resting comfortably in bed this morning, states she feels good and pain is controlled. Tolerating regular diet. Has been ambulating to the bathroom and urinating without difficulty.    Scheduled Meds:   acetaminophen  650 mg Oral Q6H     Continuous Infusions:   sodium chloride 0.9%      sodium chloride 0.9% 125 mL/hr at 03/12/22 0636     PRN Meds:sodium chloride, sodium chloride, diphenhydrAMINE, HYDROmorphone, ondansetron, oxyCODONE, oxyCODONE, promethazine, sodium chloride 0.9%    Review of patient's allergies indicates:  No Known Allergies    Objective:     Vital Signs (Most Recent):  Temp: 98.2 °F (36.8 °C) (03/12/22 0751)  Pulse: 84 (03/12/22 0751)  Resp: 16 (03/12/22 0751)  BP: 113/71 (03/12/22 0751)  SpO2: 96 % (03/12/22 0751)   Vital Signs (24h Range):  Temp:  [97.6 °F (36.4 °C)-98.9 °F (37.2 °C)] 98.2 °F (36.8 °C)  Pulse:  [] 84  Resp:  [16-19] 16  SpO2:  [93 %-99 %] 96 %  BP: (100-113)/(64-77) 113/71     Weight: 49.9 kg (110 lb)  Body mass index is 20.78 kg/m².    Intake/Output - Last 3 Shifts         03/10 0700  03/11 0659 03/11 0700 03/12 0659 03/12 0700 03/13 0659    P.O. 900 840     I.V. (mL/kg) 1106.6 (22.2) 269.7 (5.4)     Blood 261 595     IV Piggyback 2000      Total Intake(mL/kg) 4267.6 (85.5) 1704.7 (34.2)     Urine (mL/kg/hr) 1000 1500 (1.3)     Total Output 1000 1500     Net +3267.6 +204.7            Urine Occurrence 1 x  1 x               Physical Exam:   Constitutional: She is oriented to person, place, and time. She appears well-developed and well-nourished. No distress.    HENT:   Head: Normocephalic and atraumatic.    Eyes: Pupils are equal, round, and reactive to light. Conjunctivae and EOM are normal.     Cardiovascular:  Normal rate, regular rhythm and normal heart sounds.             Pulmonary/Chest: Effort normal and breath sounds normal. No respiratory distress.        Abdominal: Soft. She exhibits abdominal incision  (Dressing over minilap site c/d/i). She exhibits no distension. There is abdominal tenderness (Mild tenderness to palpation). There is no rebound and no guarding.             Musculoskeletal: Moves all extremeties. No tenderness or edema.       Neurological: She is alert and oriented to person, place, and time.    Skin: Skin is warm and dry. No rash noted. She is not diaphoretic.    Psychiatric: Her behavior is normal.     Lines/Drains/Airways       Peripheral Intravenous Line  Duration                  Peripheral IV - Single Lumen 03/10/22 0937 18 G Anterior;Proximal;Right Forearm 1 day                    Laboratory:  Recent Labs   Lab 03/11/22  0254 03/11/22  1226 03/12/22  0307   WBC 7.68 9.15 6.34   HGB 7.3* 8.0* 9.5*   HCT 23.0* 25.3* 30.2*   MCV 96 98 94    155 171

## 2022-03-13 NOTE — PLAN OF CARE
Side rails up x2; call bell in place; bed in lowest, locked position; skid proof socks on; no evidence of skin breakdown; care plan explained to patient; pt remains free of injury.  Pt tolerated po, voids, ambulates with standby assist, passing gas. Pt with c/o pain oxy IR 5 mg given x 2. CXR completed, Lasix 20 mg iv given, pt voided @ 5 times, 02 sats on RA 96%. Pt stated she is comfortable to go home. IV d/cd dressing applied. Reviewed d/c post op instructions, appts, medications and prescriptions. VSS and afebrile. Escort ordered for w/c transportation out.

## 2022-03-14 ENCOUNTER — PATIENT MESSAGE (OUTPATIENT)
Dept: GYNECOLOGIC ONCOLOGY | Facility: CLINIC | Age: 32
End: 2022-03-14
Payer: COMMERCIAL

## 2022-03-14 NOTE — PHYSICIAN QUERY
PT Name: Francis Nagel  MR #: [id number]    DOCUMENTATION CLARIFICATION      CDS/: CANDI Herr,RNC-[email or web address]        Contact information:[email or web address]    This form is a permanent document in the medical record.      Query Date: March 14, 2022    By submitting this query, we are merely seeking further clarification of documentation. Please utilize your independent clinical judgment when addressing the question(s) below.    The Medical Record contains the following:   Indicators  Supporting Clinical Findings Location in Medical Record   X Anemia documented Anemia GYN Progress note 3/12@1001am   X H&H Post-op H/H 8.7/26, down from 12/37 one week prior.   03/12/2022 - POD#2, s/p 2u prBC due to acute H/H drop to 7/23,with appropraite rise to 9.5/30 this AM. Stable for discharge.     GYN Progress note 3/12@1001am   X BP                    HR - Patient with weakness and dizziness in PACU last night, reports she is feeling better since then.  - Has been persistently hypotensive but never tachycardic.  GYN Progress note 3/12@1001am    GI bleeding documented     X Acute bleeding (Non GI site) Acute post operative decrease in hgb and symptomatic  GYN Progress note  3/11@230pm   X Transfusion(s) s/p 2u pRBC with appropriate rise in H/H GYN Progress note 3/12@1001am   X Acute/Chronic illness S/P unilateral salpingo-oophorectomy/mass resection GYN Progress note 3/12@1001am    Treatments      Other       Provider, please specify diagnosis or diagnoses associated with above clinical findings.   [  x ] Acute blood loss anemia    [   ] Acute blood loss anemia expected post-operatively    [   ] Anemia, unspecified    [   ] Other Hematological Diagnosis (please specify): _________________   [   ] Clinically Undetermined            Please document in your progress notes daily for the duration of treatment, until resolved, and include in your discharge summary.    Form No. [id number]

## 2022-03-15 NOTE — OP NOTE
DATE OF PROCEDURE:  3/10/2022     SURGEON:  Charmaine Delacruz M.D.     ASSISTANTS: JL Hamm, First [facility]ist-- No qualified resident was available for the procedure. Alfred Kent MD (RES)     PREOPERATIVE DIAGNOSIS:   1. Complex pelvic mass  2. Elevated       POSTOPERATIVE DIAGNOSES:    1. Complex pelvic mass  2. Elevated      PROCEDURE PERFORMED:  Robotic-assisted left salpingo-oophorectomy, mini-laparotomy     ANESTHESIA:  General endotracheal anesthesia.     SPECIMENS REMOVED:  1. Right fallopian tube and ovary  2. Pelvic washings     ESTIMATED BLOOD LOSS:  300 mL.     COMPLICATIONS:  None.     FINDINGS: 8 week size normal uterus. Normal cervix. Normal appearing right fallopian tube and ovary. Enlarged 16cm left ovary, solid and firm, smooth capsule---contained manual morcellation in a bag extracted from midline mini-laparotomy incision. Normal appearing appendix. Smooth peritoneal surfaces, no gross omental lesions, normal appearing diaphragms, no evidence of intraperitoneal disease.        PROCEDURE IN DETAIL:  The patient was taken to the Operating Room.  Informed consent had been obtained.  She underwent general endotracheal anesthesia without difficulty, was prepped and draped in the normal sterile fashion in a dorsal lithotomy position.  Timeout was performed.  All parties agreed to the planned procedure.  Perioperative antibiotics were administered.  Valencia catheter was placed under sterile conditions. The VCare uterine manipulator was then secured in place in a standard fashion for uterine manipulation. Gloves were changed and attention was turned to the patient's abdomen.       Veress needle was introduced at the umbilicus. Intra-abdominal placement was confirmed with low opening pressure and water drop test. Abdomen was insufflated and pneumoperitoneum was obtained.  Skin incision was made superior to the umbilicus. Robotic trocar was introduced.  Additional trocars were placed, 2  robotic trocars to the left of the camera, 1 robotic trocar to the right of the camera and an additional 8 mm assist port to the right of the camera.  The patient was placed in steep Trendelenburg. Robot was docked and operating surgeon reported to the console.       Survey of the abdomen and pelvis revealed the above findings. Pelvic washings were obtained. The posterior leaf of the broad ligament was then opened on the left facilitating access to the retroperitoneum.  The left infundibulopelvic ligament was then skeletonized with good visualization of the ureter beneath, cauterized and transected.  The left uterine-ovarian pedicle was then cauterized and transected and the enlarged left ovarian complex was detached from the uterus. Bilateral ureters were reinspected and both noted to be vermiculating equally and briskly. Pelvis was hemostatic. The robotic trocars were then removed under direct visualization and the robot was undocked. We had placed the left adnexa in an endocatch bag. We performed a contained manual morcellation within the bag via mini-laparotomy in order to extract the specimen. Fascia of the mini-laparotomy site was then reapproximated with 0-vicryl in a running fashion. Skin incisions were then rendered hemostatic. These were closed with 4-0 Monocryl in a subcuticular fashion and topped with sterile Dermabond. The patient tolerated the procedure well. Sponge, lap, needle and instrument counts were correct x2 as reported by the circulating nurse.  She was awakened from anesthesia and taken to recovery in stable condition.

## 2022-03-19 ENCOUNTER — TELEPHONE (OUTPATIENT)
Dept: GYNECOLOGIC ONCOLOGY | Facility: CLINIC | Age: 32
End: 2022-03-19
Payer: COMMERCIAL

## 2022-03-21 ENCOUNTER — TELEPHONE (OUTPATIENT)
Dept: GYNECOLOGIC ONCOLOGY | Facility: CLINIC | Age: 32
End: 2022-03-21
Payer: COMMERCIAL

## 2022-03-21 NOTE — TELEPHONE ENCOUNTER
Spoke with patient and discussed prelim pathology. See my prior note. She voiced understanding and her questions were answered.     ----- Message from Aspen Kapadia MA sent at 3/21/2022 12:46 PM CDT -----  Regarding: FW: rt a missed call  Pt returning your call  ----- Message -----  From: Zahra Olea  Sent: 3/21/2022  12:29 PM CDT  To: Jayme Montano Staff  Subject: rt a missed call                                  Type:  Patient Returning Call    Who Called: TITO STAHL [[id number]]    Who Left Message for Patient:    Does the patient know what this is regarding?yes    Best Call Back Number:[phone]    Additional Information:

## 2022-03-21 NOTE — TELEPHONE ENCOUNTER
Called patient to review pathology.   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 Final classification is pending additional stains.     No answer. Left voicemail.

## 2022-03-29 ENCOUNTER — OFFICE VISIT (OUTPATIENT)
Dept: GYNECOLOGIC ONCOLOGY | Facility: CLINIC | Age: 32
End: 2022-03-29
Payer: COMMERCIAL

## 2022-03-29 VITALS
DIASTOLIC BLOOD PRESSURE: 80 MMHG | HEART RATE: 97 BPM | BODY MASS INDEX: 19.84 KG/M2 | WEIGHT: 105 LBS | SYSTOLIC BLOOD PRESSURE: 119 MMHG

## 2022-03-29 DIAGNOSIS — C16.9: Primary | ICD-10-CM

## 2022-03-29 PROCEDURE — 99024 PR POST-OP FOLLOW-UP VISIT: ICD-10-PCS | Mod: S$GLB,,, | Performed by: OBSTETRICS & GYNECOLOGY

## 2022-03-29 PROCEDURE — 99024 POSTOP FOLLOW-UP VISIT: CPT | Mod: S$GLB,,, | Performed by: OBSTETRICS & GYNECOLOGY

## 2022-03-29 PROCEDURE — 99999 PR PBB SHADOW E&M-EST. PATIENT-LVL III: CPT | Mod: PBBFAC,,, | Performed by: OBSTETRICS & GYNECOLOGY

## 2022-03-29 PROCEDURE — 3074F PR MOST RECENT SYSTOLIC BLOOD PRESSURE < 130 MM HG: ICD-10-PCS | Mod: CPTII,S$GLB,, | Performed by: OBSTETRICS & GYNECOLOGY

## 2022-03-29 PROCEDURE — 3079F DIAST BP 80-89 MM HG: CPT | Mod: CPTII,S$GLB,, | Performed by: OBSTETRICS & GYNECOLOGY

## 2022-03-29 PROCEDURE — 3074F SYST BP LT 130 MM HG: CPT | Mod: CPTII,S$GLB,, | Performed by: OBSTETRICS & GYNECOLOGY

## 2022-03-29 PROCEDURE — 3079F PR MOST RECENT DIASTOLIC BLOOD PRESSURE 80-89 MM HG: ICD-10-PCS | Mod: CPTII,S$GLB,, | Performed by: OBSTETRICS & GYNECOLOGY

## 2022-03-29 PROCEDURE — 3008F BODY MASS INDEX DOCD: CPT | Mod: CPTII,S$GLB,, | Performed by: OBSTETRICS & GYNECOLOGY

## 2022-03-29 PROCEDURE — 3008F PR BODY MASS INDEX (BMI) DOCUMENTED: ICD-10-PCS | Mod: CPTII,S$GLB,, | Performed by: OBSTETRICS & GYNECOLOGY

## 2022-03-29 PROCEDURE — 99999 PR PBB SHADOW E&M-EST. PATIENT-LVL III: ICD-10-PCS | Mod: PBBFAC,,, | Performed by: OBSTETRICS & GYNECOLOGY

## 2022-03-29 NOTE — PHYSICIAN QUERY
PT Name: Francis Nagel  MR #: [id number]    DOCUMENTATION CLARIFICATION     CDS/: CANDI Herr,RNC-MNN        Contact information:[email or web address]  This form is a permanent document in the medical record.     Query Date: March 29, 2022    By submitting this query, we are merely seeking further clarification of documentation.  Please utilize your independent clinical judgment when addressing the question(s) below.    The medical record contains the following:  Pathology Findings Location in Medical Record   THE LEFT OVARY SHOWS ADENOCARCINOMA, INTESTINAL PHENOTYPE MEASURING 15 CM,  RUPTURED. FINAL CLASSIFICATION IS PENDING ADDITIONAL STAINS.  INITIAL IMMUNOHISTOCHEMICAL STAINS SHOW THAT THE TUMOR IS POSITIVE FOR CK7, CK 20  AND CDX2. TUMOR IS NEGATIVE FOR PAX 8 AND WT 1. THE CONTROLS STAIN APPROPRIATELY.  NOTE: Primary versus metastatic carcinoma based on morphology and immunostain pattern. Will attempt an  additional panel of stains to subclassify. Intestinal phenotype tumors can arise in multiple locations, most  commonly the upper and lower gastrointestinal tract and occaisionally cervix and pancreas. Correlation with  endoscopy and imaging may be needed if additional stains do not provide specificity. THIS CASE WAS  REVIEWED BY DR. JAVED WHO CONCURS WITH THE DIAGNOSIS.    FINDINGS: 8 week size normal uterus. Normal cervix. Normal appearing right fallopian tube and ovary. Enlarged 16cm left ovary, solid and firm, smooth capsule---contained manual morcellation in a bag extracted from midline mini-laparotomy incision. Normal appearing appendix. Smooth peritoneal surfaces, no gross omental lesions, normal appearing diaphragms, no evidence of intraperitoneal disease.       POSTOPERATIVE DIAGNOSES:    1. Complex pelvic mass  2. Elevated      PROCEDURE PERFORMED:  Robotic-assisted left salpingo-oophorectomy, mini-laparotomy Pathology report 3/10                                      Op  note 3/12       Please clarify:  [X  ] Pathology findings noted above are ruled in/confirmed as diagnoses   [  ] Pathology findings noted above are not confirmed as diagnoses   [  ] Pathology findings noted above are incidental   [  ] Other diagnosis (please specify): ___________   [  ] Clinically Undetermined       Please document in your progress notes daily for the duration of treatment until resolved and include in your discharge summary.    Form No. [id number]

## 2022-04-01 ENCOUNTER — PATIENT MESSAGE (OUTPATIENT)
Dept: GYNECOLOGIC ONCOLOGY | Facility: CLINIC | Age: 32
End: 2022-04-01
Payer: COMMERCIAL

## 2022-04-03 NOTE — PROGRESS NOTES
Subjective:      Patient ID: Francis Nagel is a 31 y.o. female.    Chief Complaint: Post-op Evaluation      HPI  S/p RA LSO 3/10/2022  Pathology:  THE LEFT OVARY SHOWS ADENOCARCINOMA, INTESTINAL PHENOTYPE MEASURING 15 CM,   RUPTURED.  FINAL CLASSIFICATION IS PENDING ADDITIONAL STAINS.   INITIAL IMMUNOHISTOCHEMICAL STAINS SHOW THAT THE TUMOR IS POSITIVE FOR CK7,   CK 20 AND CDX2.  TUMOR IS NEGATIVE FOR PAX 8 AND WT 1. THE CONTROLS STAIN   APPROPRIATELY.   NOTE:  Primary versus metastatic carcinoma based on morphology and   immunostain pattern. Will attempt an additional panel of stains to   subclassify. Intestinal phenotype tumors can arise in multiple locations,   most commonly the upper and lower gastrointestinal tract and occaisionally   cervix and pancreas. Correlation with endoscopy and imaging may be needed if   additional stains do not provide specificity.    Presents today for post operative visit and further treatment planning. Recovering appropriately from surgery. Up and about, eating, +BM.  Reports 9/2021 pap normal, she will obtain a copy for me.   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     Referral history:  Self referred for a second opinion regarding pelvic mass and elevated .      She has previously seen Dr. Rupa Taylor and was referred to Dr. Taylor by Dr. Michelle Ramirez.      Pelvic US 2/4/2022  Uterus: The uterus measures 9 x 5.5 x 6.3 cm in dimension.  No uterine masses appreciated.  There is a normal homogeneous uterine parenchymal echotexture.  The endometrial stripe measures 5 mm, normal for a premenopausal patient.  No fluid/blood products within the endometrial canal.     Ovaries: The right ovary is markedly enlarged measuring 15.7 x 7.9 x 11.1 cm.  The left ovary is not visualized.  The right ovary appears nearly completely replaced by a large complex multi-septated cystic mass which shows areas  with low level internal echoes.  No associated calcifications.  Benign and malignant ovarian neoplasms are possible.  Consider additional characterization of the right ovary with contrast enhanced MRI of the female pelvis.  There is normal arterial and venous color flow present in the right ovary.     CT A&P 2/15/2022  - there is a large complex pelvic mass which is centered above and above and left lateral to the uterus.  This measures 16.7 cm craniocaudal by 14 point 8 cm transversely by 12.8 cm AP.  This contains multiple cystic areas and demonstrates in prominent enhancement and vascularity in other areas.  The uterus is anteverted and compressed.  Whether this arises from the right or left ovary is uncertain.  There is early bilateral hydronephrosis.  - of note there is no ascites, omental caking or peritoneal implants     DONNY 0.53 (low risk in a premenopausal patient)     Tumor markers:    Inhibin A 10  Inhibin B 46  hcg <2.4   42 (elevated)  AFP 2.7     No prior abdominal surgeries.  x 2. Does not desire future fertility.      Denies family history of breast, ovarian, uterine or colon cancer.      Endorses some occasional pelvic discomforts. Her  was available via speaker phone during our visit.    Review of Systems   Constitutional: Negative for appetite change, chills, fatigue and fever.   HENT: Negative for mouth sores.    Respiratory: Negative for cough and shortness of breath.    Cardiovascular: Negative for leg swelling.   Gastrointestinal: Negative for abdominal pain, blood in stool, constipation and diarrhea.   Endocrine: Negative for cold intolerance.   Genitourinary: Negative for dysuria and vaginal bleeding.   Musculoskeletal: Negative for myalgias.   Skin: Negative for rash.   Allergic/Immunologic: Negative.    Neurological: Negative for weakness and numbness.   Hematological: Negative for adenopathy. Does not bruise/bleed easily.   Psychiatric/Behavioral: Negative for  confusion.       Objective:   Physical Exam:   Constitutional: She is oriented to person, place, and time. She appears well-developed and well-nourished.    HENT:   Head: Normocephalic and atraumatic.    Eyes: Pupils are equal, round, and reactive to light. EOM are normal.    Neck: No thyromegaly present.    Cardiovascular: Normal rate, regular rhythm and intact distal pulses.     Pulmonary/Chest: Effort normal and breath sounds normal. No respiratory distress. She has no wheezes.        Abdominal: Soft. Bowel sounds are normal. She exhibits abdominal incision. She exhibits no distension and no mass. There is no abdominal tenderness.             Musculoskeletal: Normal range of motion and moves all extremeties.      Lymphadenopathy:     She has no cervical adenopathy.        Right: No supraclavicular adenopathy present.        Left: No supraclavicular adenopathy present.    Neurological: She is alert and oriented to person, place, and time.    Skin: Skin is warm and dry. No rash noted.    Psychiatric: She has a normal mood and affect.       Assessment:     1. Adenocarcinoma, intestinal type        Plan:     Orders Placed This Encounter   Procedures    Ambulatory referral/consult to Gastroenterology     Recovering appropriately from surgery.   Ovary showed intestinal type adenocarcinoma. Plan for further work up and evaluation for potential krukenberg tumor with CT pancreas protocol and EGD/colonoscopy. She will obtain a copy of her normal pap smear for me.   RTC 2-4 weeks.

## 2022-04-07 ENCOUNTER — OFFICE VISIT (OUTPATIENT)
Dept: GASTROENTEROLOGY | Facility: CLINIC | Age: 32
End: 2022-04-07
Payer: COMMERCIAL

## 2022-04-07 ENCOUNTER — TELEPHONE (OUTPATIENT)
Dept: GASTROENTEROLOGY | Facility: CLINIC | Age: 32
End: 2022-04-07
Payer: COMMERCIAL

## 2022-04-07 VITALS — WEIGHT: 107.38 LBS | HEIGHT: 61 IN | BODY MASS INDEX: 20.27 KG/M2

## 2022-04-07 DIAGNOSIS — C16.9: Primary | ICD-10-CM

## 2022-04-07 DIAGNOSIS — Z90.721 S/P UNILATERAL SALPINGO-OOPHORECTOMY: ICD-10-CM

## 2022-04-07 PROCEDURE — 1159F PR MEDICATION LIST DOCUMENTED IN MEDICAL RECORD: ICD-10-PCS | Mod: CPTII,S$GLB,, | Performed by: INTERNAL MEDICINE

## 2022-04-07 PROCEDURE — 3008F BODY MASS INDEX DOCD: CPT | Mod: CPTII,S$GLB,, | Performed by: INTERNAL MEDICINE

## 2022-04-07 PROCEDURE — 1160F PR REVIEW ALL MEDS BY PRESCRIBER/CLIN PHARMACIST DOCUMENTED: ICD-10-PCS | Mod: CPTII,S$GLB,, | Performed by: INTERNAL MEDICINE

## 2022-04-07 PROCEDURE — 99203 PR OFFICE/OUTPT VISIT, NEW, LEVL III, 30-44 MIN: ICD-10-PCS | Mod: S$GLB,,, | Performed by: INTERNAL MEDICINE

## 2022-04-07 PROCEDURE — 99999 PR PBB SHADOW E&M-EST. PATIENT-LVL III: ICD-10-PCS | Mod: PBBFAC,,, | Performed by: INTERNAL MEDICINE

## 2022-04-07 PROCEDURE — 1159F MED LIST DOCD IN RCRD: CPT | Mod: CPTII,S$GLB,, | Performed by: INTERNAL MEDICINE

## 2022-04-07 PROCEDURE — 1160F RVW MEDS BY RX/DR IN RCRD: CPT | Mod: CPTII,S$GLB,, | Performed by: INTERNAL MEDICINE

## 2022-04-07 PROCEDURE — 1111F PR DISCHARGE MEDS RECONCILED W/ CURRENT OUTPATIENT MED LIST: ICD-10-PCS | Mod: CPTII,S$GLB,, | Performed by: INTERNAL MEDICINE

## 2022-04-07 PROCEDURE — 3008F PR BODY MASS INDEX (BMI) DOCUMENTED: ICD-10-PCS | Mod: CPTII,S$GLB,, | Performed by: INTERNAL MEDICINE

## 2022-04-07 PROCEDURE — 99999 PR PBB SHADOW E&M-EST. PATIENT-LVL III: CPT | Mod: PBBFAC,,, | Performed by: INTERNAL MEDICINE

## 2022-04-07 PROCEDURE — 99203 OFFICE O/P NEW LOW 30 MIN: CPT | Mod: S$GLB,,, | Performed by: INTERNAL MEDICINE

## 2022-04-07 PROCEDURE — 1111F DSCHRG MED/CURRENT MED MERGE: CPT | Mod: CPTII,S$GLB,, | Performed by: INTERNAL MEDICINE

## 2022-04-07 NOTE — PROGRESS NOTES
"Subjective:       Patient ID: Francis Nagel is a 31 y.o. female.    Chief Complaint: No chief complaint on file.    This is my first encounter with this pleasant 30 y/o F, here with her mother-in-law, who presents to discuss her recent surgery for a pelvic mass, and the future w/u.  See the reports below, for a time sequence.   She was referred by Dr. Delacruz specifically, " for EGD/colonoscopy and CT pancrease protocol for further evaluation of potential krukenberg tumor per pathology as above. "     She is recovering well from her surgery.    She has no GI symptoms.  She denies heartburn.  No post prandial pain, or gas, or cramps.  Her BMs are normal.  Nomelena or BRBPR.  As far as she knows, there's no FHx of GI cancer.  She is originally from [facility].        See initial visit w PCP  Office Visit   2/3/2022  [facility]  Sudhir Johnson MD  Progress Notes  Sudhir Johnson MD (Physician)   Family Medicine  HPI: Pelvic tenderness since 11/2021. No prioe diagnosis of ovarian cyst, but she thinks it is a cyst.  She has a history of endometritis. Periods are normal.  Last GYN exam was 11/2021.          U/S 2/04/2022  Impression:   1. 15.7 x 7.9 x 11.1 cm complex multi-septated cystic right ovarian mass which essentially replaces the entire right ovary.  There is normal arterial and venous color flow present within the right ovary.  Consider additional characterization with contrast enhanced MRI of the female pelvis.  Consideration should be given to surgical removal given the large size (which places the patient at increased risk of developing ovarian torsion).  2. Nonvisualization of the left ovary.  This report was flagged in Epic as abnormal.   Electronically signed by: David Francis MD  Date:                                            02/04/2022        DATE OF PROCEDURE:  3/10/2022   SURGEON:  Charmaine Delacruz M.D.  PREOPERATIVE DIAGNOSIS:   1. Complex pelvic mass  2. Elevated "     POSTOPERATIVE DIAGNOSES:    1. Complex pelvic mass  2. Elevated    PROCEDURE PERFORMED:  Robotic-assisted left salpingo-oophorectomy, mini-laparotomy  SPECIMENS REMOVED:  1. Right fallopian tube and ovary  2. Pelvic washings    FINDINGS: 8 week size normal uterus. Normal cervix. Normal appearing right fallopian tube and ovary. Enlarged 16cm left ovary, solid and firm, smooth capsule---contained manual morcellation in a bag extracted from midline mini-laparotomy incision. Normal appearing appendix. Smooth peritoneal surfaces, no gross omental lesions, normal appearing diaphragms, no evidence of intraperitoneal disease.     PROCEDURE IN DETAIL:  Veress needle was introduced at the umbilicus. Intra-abdominal placement was confirmed with low opening pressure and water drop test. Abdomen was insufflated and pneumoperitoneum was obtained.  Skin incision was made superior to the umbilicus. Robotic trocar was introduced.  Additional trocars were placed, 2 robotic trocars to the left of the camera, 1 robotic trocar to the right of the camera and an additional 8 mm assist port to the right of the camera.  The patient was placed in steep Trendelenburg. Robot was docked and operating surgeon reported to the console.       Survey of the abdomen and pelvis revealed the above findings. Pelvic washings were obtained. The posterior leaf of the broad ligament was then opened on the left facilitating access to the retroperitoneum.  The left infundibulopelvic ligament was then skeletonized with good visualization of the ureter beneath, cauterized and transected.  The left uterine-ovarian pedicle was then cauterized and transected and the enlarged left ovarian complex was detached from the uterus. Bilateral ureters were reinspected and both noted to be vermiculating equally and briskly. Pelvis was hemostatic. The robotic trocars were then removed under direct visualization and the robot was undocked. We had placed the left  adnexa in an endocatch bag. We performed a contained manual morcellation within the bag via mini-laparotomy in order to extract the specimen.         Pathology:  THE LEFT OVARY SHOWS ADENOCARCINOMA, INTESTINAL PHENOTYPE MEASURING 15 CM, RUPTURED.  FINAL CLASSIFICATION IS PENDING ADDITIONAL STAINS.   INITIAL IMMUNOHISTOCHEMICAL STAINS SHOW THAT THE TUMOR IS POSITIVE FOR CK7, CK 20 AND CDX2.  TUMOR IS NEGATIVE FOR PAX 8 AND WT 1. THE CONTROLS STAIN APPROPRIATELY.   NOTE:  Primary versus metastatic carcinoma based on morphology and immunostain pattern. Will attempt an additional panel of stains to   subclassify. Intestinal phenotype tumors can arise in multiple locations, most commonly the upper and lower gastrointestinal tract and occaisionally   cervix and pancreas. Correlation with endoscopy and imaging may be needed if additional stains do not provide specificity.    THIS CASE WAS REVIEWED BY DR. JAVED WHO CONCURS WITH THE DIAGNOSIS.  VC     Comment: Interp By Chandler Cartwright M.D., Signed on 03/29/2022 at 13:49  Supplemental Diagnosis   THE RESULTS OF MMR IMMUNOSTAINS ARE AS FOLLOWS:   PMS2, MSH6, MSH2 AND MLH1 ALL SHOW INTACT NUCLEAR STAINING.  THE CONTROLS STAIN APPROPRIATELY.   NOTE:  WITH INTACT NUCLEAR STAINING THE PROBABILITY OF MICROSATELLITE INSTABILITY IS LOW.  HOWEVER, WE ADVISE CORRELATION WITH CLINICAL FINDINGS.         Office Visit    3/29/2022  [facility] - Gyn Onc 2nd Fl  Charmaine Delacruz MD  Gynecologic Oncology Adenocarcinoma, intestinal type  HPI  S/p RA LSO 3/10/2022  Pathology:  THE LEFT OVARY SHOWS ADENOCARCINOMA, INTESTINAL PHENOTYPE MEASURING 15 CM, RUPTURED.  FINAL CLASSIFICATION IS PENDING ADDITIONAL STAINS.   INITIAL IMMUNOHISTOCHEMICAL STAINS SHOW THAT THE TUMOR IS POSITIVE FOR CK7, CK 20 AND CDX2.  TUMOR IS NEGATIVE FOR PAX 8 AND WT 1. THE CONTROLS STAIN APPROPRIATELY.   NOTE:  Primary versus metastatic carcinoma based on morphology and immunostain pattern. Will attempt an  additional panel of stains to   subclassify. Intestinal phenotype tumors can arise in multiple locations, most commonly the upper and lower gastrointestinal tract and occaisionally   cervix and pancreas. Correlation with endoscopy and imaging may be needed if additional stains do not provide specificity.     Presents today for post operative visit and further treatment planning. Recovering appropriately from surgery. Up and about, eating, +BM.  Reports 9/2021 pap normal, she will obtain a copy for me.   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      Referral history:  Self referred for a second opinion regarding pelvic mass and elevated .    She has previously seen Dr. Rupa Taylor and was referred to Dr. Taylor by Dr. Michelle Ramirez.     Assessment:   1. Adenocarcinoma, intestinal type    Plan:   Orders Placed This Encounter  Procedures   Ambulatory referral/consult to Gastroenterology   Recovering appropriately from surgery.   Ovary showed intestinal type adenocarcinoma. Plan for further work up and evaluation for potential krukenberg tumor with CT pancreas protocol and EGD/colonoscopy. She will obtain a copy of her normal pap smear for me.   RTC 2-4 weeks.                   Review of Systems   Constitutional: Negative.  Negative for activity change, appetite change, fatigue, fever and unexpected weight change.   HENT: Negative.  Negative for dental problem, drooling, mouth sores, sore throat, trouble swallowing and voice change.    Eyes: Negative.    Respiratory: Negative for cough, choking, shortness of breath, wheezing and stridor.    Cardiovascular: Negative for chest pain.   Gastrointestinal: Negative for abdominal distention, abdominal pain, anal bleeding, blood in stool, constipation, diarrhea, nausea, rectal pain, vomiting and fecal incontinence.   Genitourinary: Negative.  Negative for difficulty urinating and  dysuria.   Musculoskeletal: Negative for arthralgias, joint swelling, myalgias and neck stiffness.   Integumentary:  Negative for color change and rash.   Neurological: Negative for weakness.   Hematological: Negative for adenopathy. Does not bruise/bleed easily.   Psychiatric/Behavioral: Negative for agitation, behavioral problems, confusion, decreased concentration and dysphoric mood.         Objective:      Physical Exam  Exam conducted with a chaperone present.   Constitutional:       General: She is not in acute distress.     Appearance: Normal appearance. She is well-developed and normal weight.   HENT:      Head: Normocephalic.      Nose: Nose normal.      Mouth/Throat:      Mouth: Mucous membranes are moist.      Pharynx: No oropharyngeal exudate.   Eyes:      General: No scleral icterus.     Conjunctiva/sclera: Conjunctivae normal.   Neck:      Thyroid: No thyromegaly.      Trachea: No tracheal deviation.   Cardiovascular:      Rate and Rhythm: Normal rate and regular rhythm.      Heart sounds: Normal heart sounds. No murmur heard.    No gallop.   Pulmonary:      Effort: Pulmonary effort is normal.      Breath sounds: Normal breath sounds. No wheezing or rales.   Abdominal:      General: Bowel sounds are normal. There is no distension.      Palpations: Abdomen is soft. There is no mass.      Tenderness: There is no abdominal tenderness. There is no guarding.      Comments: Flat.  Has about 4 healing laparoscopy/robotic surgery scars from recent surgery.  Soft.  Nontender.  No masses.   Musculoskeletal:         General: Normal range of motion.      Cervical back: Neck supple.   Lymphadenopathy:      Cervical: No cervical adenopathy.   Skin:     General: Skin is warm and dry.      Findings: No erythema or rash.   Neurological:      General: No focal deficit present.      Mental Status: She is alert and oriented to person, place, and time.   Psychiatric:         Mood and Affect: Mood normal.         Behavior:  Behavior normal.         Thought Content: Thought content normal.         Judgment: Judgment normal.          Assessment:         Adenocarcinoma, intestinal type  -     Ambulatory referral/consult to Gastroenterology    S/P unilateral salpingo-oophorectomy/mass resection      Plan:        1. Schedule for EGD and Colonoscopy.   2. Probably, then to follow, CT of pancreas.

## 2022-04-07 NOTE — TELEPHONE ENCOUNTER
Attempted to reach the patient to offer her a date for the EGD/Colonoscopy that her oncologist was requesting as we have had a cancellation for 04/11/2022, left message asking for a call back to the clinic, clinic number provided.

## 2022-04-10 ENCOUNTER — ANESTHESIA EVENT (OUTPATIENT)
Dept: ENDOSCOPY | Facility: HOSPITAL | Age: 32
End: 2022-04-10
Payer: COMMERCIAL

## 2022-04-11 ENCOUNTER — ANESTHESIA (OUTPATIENT)
Dept: ENDOSCOPY | Facility: HOSPITAL | Age: 32
End: 2022-04-11
Payer: COMMERCIAL

## 2022-04-11 ENCOUNTER — HOSPITAL ENCOUNTER (OUTPATIENT)
Facility: HOSPITAL | Age: 32
Discharge: HOME | End: 2022-04-11
Attending: INTERNAL MEDICINE | Admitting: INTERNAL MEDICINE
Payer: COMMERCIAL

## 2022-04-11 DIAGNOSIS — C79.9 METASTATIC CANCER: ICD-10-CM

## 2022-04-11 LAB
B-HCG UR QL: NEGATIVE
CTP QC/QA: YES

## 2022-04-11 PROCEDURE — D9220A PRA ANESTHESIA: ICD-10-PCS | Mod: ANES,,, | Performed by: ANESTHESIOLOGY

## 2022-04-11 PROCEDURE — D9220A PRA ANESTHESIA: Mod: ANES,,, | Performed by: ANESTHESIOLOGY

## 2022-04-11 PROCEDURE — 37000009 HC ANESTHESIA EA ADD 15 MINS: Mod: PO | Performed by: INTERNAL MEDICINE

## 2022-04-11 PROCEDURE — 88305 TISSUE EXAM BY PATHOLOGIST: ICD-10-PCS | Mod: 26,,, | Performed by: PATHOLOGY

## 2022-04-11 PROCEDURE — 88305 TISSUE EXAM BY PATHOLOGIST: CPT | Performed by: PATHOLOGY

## 2022-04-11 PROCEDURE — 43239 PR EGD, FLEX, W/BIOPSY, SGL/MULTI: ICD-10-PCS | Mod: 51,,, | Performed by: INTERNAL MEDICINE

## 2022-04-11 PROCEDURE — 63600175 PHARM REV CODE 636 W HCPCS: Mod: PO | Performed by: NURSE ANESTHETIST, CERTIFIED REGISTERED

## 2022-04-11 PROCEDURE — 27201012 HC FORCEPS, HOT/COLD, DISP: Mod: PO | Performed by: INTERNAL MEDICINE

## 2022-04-11 PROCEDURE — D9220A PRA ANESTHESIA: ICD-10-PCS | Mod: CRNA,,, | Performed by: NURSE ANESTHETIST, CERTIFIED REGISTERED

## 2022-04-11 PROCEDURE — 37000008 HC ANESTHESIA 1ST 15 MINUTES: Mod: PO | Performed by: INTERNAL MEDICINE

## 2022-04-11 PROCEDURE — 63600175 PHARM REV CODE 636 W HCPCS: Mod: PO | Performed by: INTERNAL MEDICINE

## 2022-04-11 PROCEDURE — 43239 EGD BIOPSY SINGLE/MULTIPLE: CPT | Mod: PO | Performed by: INTERNAL MEDICINE

## 2022-04-11 PROCEDURE — D9220A PRA ANESTHESIA: Mod: CRNA,,, | Performed by: NURSE ANESTHETIST, CERTIFIED REGISTERED

## 2022-04-11 PROCEDURE — 88305 TISSUE EXAM BY PATHOLOGIST: CPT | Mod: 26,,, | Performed by: PATHOLOGY

## 2022-04-11 PROCEDURE — 25000003 PHARM REV CODE 250: Mod: PO | Performed by: NURSE ANESTHETIST, CERTIFIED REGISTERED

## 2022-04-11 PROCEDURE — 45378 PR COLONOSCOPY,DIAGNOSTIC: ICD-10-PCS | Mod: ,,, | Performed by: INTERNAL MEDICINE

## 2022-04-11 PROCEDURE — 81025 URINE PREGNANCY TEST: CPT | Mod: PO | Performed by: INTERNAL MEDICINE

## 2022-04-11 PROCEDURE — 45378 DIAGNOSTIC COLONOSCOPY: CPT | Mod: ,,, | Performed by: INTERNAL MEDICINE

## 2022-04-11 PROCEDURE — 45378 DIAGNOSTIC COLONOSCOPY: CPT | Mod: PO | Performed by: INTERNAL MEDICINE

## 2022-04-11 PROCEDURE — 43239 EGD BIOPSY SINGLE/MULTIPLE: CPT | Mod: 51,,, | Performed by: INTERNAL MEDICINE

## 2022-04-11 RX ORDER — SODIUM CHLORIDE 0.9 % (FLUSH) 0.9 %
10 SYRINGE (ML) INJECTION
Status: DISCONTINUED | OUTPATIENT
Start: 2022-04-11 | End: 2022-04-11 | Stop reason: HOSPADM

## 2022-04-11 RX ORDER — PROPOFOL 10 MG/ML
VIAL (ML) INTRAVENOUS
Status: DISCONTINUED | OUTPATIENT
Start: 2022-04-11 | End: 2022-04-11

## 2022-04-11 RX ORDER — LIDOCAINE HCL/PF 100 MG/5ML
SYRINGE (ML) INTRAVENOUS
Status: DISCONTINUED | OUTPATIENT
Start: 2022-04-11 | End: 2022-04-11

## 2022-04-11 RX ORDER — PROPOFOL 10 MG/ML
VIAL (ML) INTRAVENOUS CONTINUOUS PRN
Status: DISCONTINUED | OUTPATIENT
Start: 2022-04-11 | End: 2022-04-11

## 2022-04-11 RX ORDER — FENTANYL CITRATE 50 UG/ML
INJECTION, SOLUTION INTRAMUSCULAR; INTRAVENOUS
Status: DISCONTINUED | OUTPATIENT
Start: 2022-04-11 | End: 2022-04-11

## 2022-04-11 RX ORDER — SODIUM CHLORIDE, SODIUM LACTATE, POTASSIUM CHLORIDE, CALCIUM CHLORIDE 600; 310; 30; 20 MG/100ML; MG/100ML; MG/100ML; MG/100ML
INJECTION, SOLUTION INTRAVENOUS CONTINUOUS
Status: DISCONTINUED | OUTPATIENT
Start: 2022-04-11 | End: 2022-04-11 | Stop reason: HOSPADM

## 2022-04-11 RX ADMIN — FENTANYL CITRATE 100 MCG: 50 INJECTION, SOLUTION INTRAMUSCULAR; INTRAVENOUS at 12:04

## 2022-04-11 RX ADMIN — PROPOFOL 100 MG: 10 INJECTION, EMULSION INTRAVENOUS at 12:04

## 2022-04-11 RX ADMIN — PROPOFOL 150 MCG/KG/MIN: 10 INJECTION, EMULSION INTRAVENOUS at 12:04

## 2022-04-11 RX ADMIN — LIDOCAINE HYDROCHLORIDE 100 MG: 20 INJECTION, SOLUTION INTRAVENOUS at 12:04

## 2022-04-11 RX ADMIN — SODIUM CHLORIDE, SODIUM LACTATE, POTASSIUM CHLORIDE, AND CALCIUM CHLORIDE: .6; .31; .03; .02 INJECTION, SOLUTION INTRAVENOUS at 12:04

## 2022-04-11 NOTE — PROVATION PATIENT INSTRUCTIONS
Discharge Summary/Instructions after an Endoscopic Procedure  Patient Name: Francis Nagel  Patient MRN: [id number]  Patient YOB: 1990 Monday, April 11, 2022  Eric Meléndez MD  Dear patient,  As a result of recent federal legislation (The Federal Cures Act), you may   receive lab or pathology results from your procedure in your [facility]   account before your physician is able to contact you. Your physician or   their representative will relay the results to you with their   recommendations at their soonest availability.  Thank you,  RESTRICTIONS:  During your procedure today, you received medications for sedation.  These   medications may affect your judgment, balance and coordination.  Therefore,   for 24 hours, you have the following restrictions:   - DO NOT drive a car, operate machinery, make legal/financial decisions,   sign important papers or drink alcohol.    ACTIVITY:  Today: no heavy lifting, straining or running due to procedural   sedation/anesthesia.  The following day: return to full activity including work.  DIET:  Eat and drink normally unless instructed otherwise.     TREATMENT FOR COMMON SIDE EFFECTS:  - Mild abdominal pain, nausea, belching, bloating or excessive gas:  rest,   eat lightly and use a heating pad.  - Sore Throat: treat with throat lozenges and/or gargle with warm salt   water.  - Because air was used during the procedure, expelling large amounts of air   from your rectum or belching is normal.  - If a bowel prep was taken, you may not have a bowel movement for 1-3 days.    This is normal.  SYMPTOMS TO WATCH FOR AND REPORT TO YOUR PHYSICIAN:  1. Abdominal pain or bloating, other than gas cramps.  2. Chest pain.  3. Back pain.  4. Signs of infection such as: chills or fever occurring within 24 hours   after the procedure.  5. Rectal bleeding, which would show as bright red, maroon, or black stools.   (A tablespoon of blood from the rectum is not serious,  especially if   hemorrhoids are present.)  6. Vomiting.  7. Weakness or dizziness.  GO DIRECTLY TO THE NEAREST EMERGENCY ROOM IF YOU HAVE ANY OF THE FOLLOWING:      Difficulty breathing              Chills and/or fever over 101 F   Persistent vomiting and/or vomiting blood   Severe abdominal pain   Severe chest pain   Black, tarry stools   Bleeding- more than one tablespoon   Any other symptom or condition that you feel may need urgent attention  Your doctor recommends these additional instructions:  If any biopsies were taken, your doctors clinic will contact you in 1 to 2   weeks with any results.  Continue your present medications.   Return to your referring physician, Dr. Delacruz, as previously scheduled.  For questions, problems or results please call your physician - Eric Meléndez MD at Work:  [phone].  EMERGENCY PHONE NUMBER: [phone], LAB RESULTS: [phone]  IF A COMPLICATION OR EMERGENCY SITUATION ARISES AND YOU ARE UNABLE TO REACH   YOUR PHYSICIAN - GO DIRECTLY TO THE EMERGENCY ROOM.  ___________________________________________  Nurse Signature  ___________________________________________  Patient/Designated Responsible Party Signature  Eric Meléndez MD  4/11/2022 1:49:37 PM  This report has been verified and signed electronically.  Dear patient,  As a result of recent federal legislation (The Federal Cures Act), you may   receive lab or pathology results from your procedure in your [facility]   account before your physician is able to contact you. Your physician or   their representative will relay the results to you with their   recommendations at their soonest availability.  Thank you.  PROVATION

## 2022-04-11 NOTE — ANESTHESIA PREPROCEDURE EVALUATION
04/11/2022  Francis Nagel is a 31 y.o., female.      Pre-op Assessment    I have reviewed the NPO Status.   I have reviewed the Medications.     Review of Systems  Anesthesia Hx:  Hx of Anesthetic complications PONV   Social:  Non-Smoker    Cardiovascular:   Exercise tolerance: good    Pulmonary:  Pulmonary Normal    Renal/:  Renal/ Normal     Neurological:  Neurology Normal    Endocrine:  Endocrine Normal        Physical Exam  General: Well nourished    Airway:  Mallampati: II   Mouth Opening: Normal  TM Distance: Normal  Tongue: Normal  Neck ROM: Normal ROM    Dental:  Intact    Chest/Lungs:  Clear to auscultation, Normal Respiratory Rate        Anesthesia Plan  Type of Anesthesia, risks & benefits discussed:    Anesthesia Type: Gen Natural Airway  Intra-op Monitoring Plan: Standard ASA Monitors  Induction:  IV  Informed Consent: Informed consent signed with the Patient and all parties understand the risks and agree with anesthesia plan.  All questions answered.   ASA Score: 1    Ready For Surgery From Anesthesia Perspective.     .

## 2022-04-11 NOTE — TRANSFER OF CARE
"Anesthesia Transfer of Care Note    Patient: Francis Nagel    Procedure(s) Performed: Procedure(s) (LRB):  EGD (ESOPHAGOGASTRODUODENOSCOPY) (N/A)  COLONOSCOPY (N/A)    Patient location: PACU    Anesthesia Type: general    Transport from OR: Transported from OR on room air with adequate spontaneous ventilation    Post pain: adequate analgesia    Post assessment: no apparent anesthetic complications and tolerated procedure well    Post vital signs: stable    Level of consciousness: sedated    Nausea/Vomiting: no nausea/vomiting    Complications: none    Transfer of care protocol was followed      Last vitals:   Visit Vitals  /71 (BP Location: Right arm, Patient Position: Lying)   Pulse 87   Temp 36.5 °C (97.7 °F) (Skin)   Resp 18   Ht 5' 1" (1.549 m)   Wt 49 kg (108 lb)   LMP 03/12/2022   SpO2 100%   Breastfeeding No   BMI 20.41 kg/m²     "

## 2022-04-11 NOTE — PATIENT INSTRUCTIONS
Recovery After Procedural Sedation (Adult)   You have been given medicine by vein to make you sleep during your surgery. This may have included both a pain medicine and sleeping medicine. Most of the effects have worn off. But you may still have some drowsiness for the next 6 to 8 hours.  Home care  Follow these guidelines when you get home:  For the next 8 hours, you should be watched by a responsible adult. This person should make sure your condition is not getting worse.  Don't drink any alcohol for the next 24 hours.  Don't drive, operate dangerous machinery, or make important business or personal decisions during the next 24 hours.  To prevent injury or falls, use caution when standing and walking for at least 24 hours after your procedure.  Note: Your healthcare provider may tell you not to take any medicine by mouth for pain or sleep in the next 4 hours. These medicines may react with the medicines you were given in the hospital. This could cause a much stronger response than usual.  Follow-up care  Follow up with your healthcare provider if you are not alert and back to your usual level of activity within 12 hours.  When to seek medical advice  Call your healthcare provider right away if any of these occur:  Drowsiness gets worse  Weakness or dizziness gets worse  Repeated vomiting  You can't be awakened  Fever  New rash  [employer] last reviewed this educational content on 9/1/2019  © 7207-0797 [employer]. [address] [place] [address], [place] [id number]. All rights reserved. This information is not intended as a substitute for professional medical care. Always follow your healthcare professional's instructions       High-Fiber Diet  Fiber is in fruits, vegetables, cereals, and grains. Fiber passes through your body undigested. A high-fiber diet helps food move through your intestinal tract. The added bulk is helpful in preventing constipation. In people with diverticulosis, fiber helps clean out the  pouches along the colon wall. It also prevents new pouches from forming. A high-fiber diet reduces the risk of colon cancer. It also lowers blood cholesterol and prevents high blood sugar in people with diabetes.    The fiber-rich foods listed below should be part of your diet. If you are not used to high-fiber foods, start with 1 or 2 foods from this list. Every 3 to 4 days add a new one to your diet. Do this until you are eating 4 high-fiber foods per day. This should give you 20 to 35 grams of fiber a day. It is also important to drink a lot of water when you are on this diet. You should have 6 to 8 glasses of water a day. Water makes the fiber swell and increases the benefit.  Foods high in dietary fiber  The following foods are high in dietary fiber:  Breads. Breads made with 100% whole-wheat flour; abby, wheat, or rye crackers; whole-grain tortillas, bran muffins.  Cereals. Whole-grain and bran cereals with bran (shredded wheat, wheat flakes, raisin bran, corn bran); oatmeal, rolled oats, granola, and brown rice.  Fruits. Fresh fruits and their edible skins (pears, prunes, raisins, berries, apples, and apricots); bananas, citrus fruit, mangoes, pineapple; and prune juice.  Nuts. Any nuts and seeds.  Vegetables. Best served raw or lightly cooked. All types, especially: green peas, celery, eggplant, potatoes, spinach, broccoli, Julian sprouts, winter squash, carrots, cauliflower, soybeans, lentils, and fresh and dried beans of all kinds.  Other. Popcorn, any spices.  Date Last Reviewed: 8/1/2016  © 1121-3782 [employer]. [address] [place] [address], [place] [address]. All rights reserved. This information is not intended as a substitute for professional medical care. Always follow your healthcare professional's instructions.

## 2022-04-11 NOTE — H&P
"History & Physical - Short Stay  Gastroenterology      SUBJECTIVE:     Procedure: Gastroscopy and Colonoscopy    Chief Complaint/Indication for Procedure:    Referred by Dr. Delacruz specifically, " for EGD/colonoscopy and CT pancrease protocol for further evaluation of potential krukenberg tumor per pathology as above. "   GI wise, she is asymptomatic.    History of Present Illness:  See recent OV with me:  Office Visit   4/7/2022  [facility] - Gastroenterology     Eric Meléndez Jr., MD    Gastroenterology  Adenocarcinoma, intestinal type +1 more    Dx  Abdominal Pain ; Referred by Charmaine Delacruz MD    Reason for Visit     Progress Notes  Eric Meléndez Jr., MD (Physician)   Gastroenterology  Subjective:       Patient ID: Francis Nagel is a 31 y.o. female.     Chief Complaint: No chief complaint on file.     This is my first encounter with this pleasant 32 y/o F, here with her mother-in-law, who presents to discuss her recent surgery for a pelvic mass, and the future w/u.  See the reports below, for a time sequence.   She was referred by Dr. Delacruz specifically, " for EGD/colonoscopy and CT pancrease protocol for further evaluation of potential krukenberg tumor per pathology as above. "     She is recovering well from her surgery.     She has no GI symptoms.  She denies heartburn.  No post prandial pain, or gas, or cramps.  Her BMs are normal.  Nomelena or BRBPR.  As far as she knows, there's no FHx of GI cancer.  She is originally from [facility] [place].           See initial visit w PCP  Office Visit   2/3/2022  [facility]  Sudhir Johnson MD  Progress Notes  Sudhir Johnson MD (Physician)   Family Medicine  HPI: Pelvic tenderness since 11/2021. No prioe diagnosis of ovarian cyst, but she thinks it is a cyst.  She has a history of endometritis. Periods are normal.  Last GYN exam was 11/2021.             U/S 2/04/2022  Impression:   1. 15.7 x 7.9 x 11.1 cm complex multi-septated cystic right " ovarian mass which essentially replaces the entire right ovary.  There is normal arterial and venous color flow present within the right ovary.  Consider additional characterization with contrast enhanced MRI of the female pelvis.  Consideration should be given to surgical removal given the large size (which places the patient at increased risk of developing ovarian torsion).  2. Nonvisualization of the left ovary.  This report was flagged in Epic as abnormal.   Electronically signed by: David Francis MD  Date:                                            02/04/2022           DATE OF PROCEDURE:  3/10/2022   SURGEON:  Charmaine Delacruz M.D.  PREOPERATIVE DIAGNOSIS:   1. Complex pelvic mass  2. Elevated     POSTOPERATIVE DIAGNOSES:    1. Complex pelvic mass  2. Elevated    PROCEDURE PERFORMED:  Robotic-assisted left salpingo-oophorectomy, mini-laparotomy  SPECIMENS REMOVED:  1. Right fallopian tube and ovary  2. Pelvic washings     FINDINGS: 8 week size normal uterus. Normal cervix. Normal appearing right fallopian tube and ovary. Enlarged 16cm left ovary, solid and firm, smooth capsule---contained manual morcellation in a bag extracted from midline mini-laparotomy incision. Normal appearing appendix. Smooth peritoneal surfaces, no gross omental lesions, normal appearing diaphragms, no evidence of intraperitoneal disease.     PROCEDURE IN DETAIL:  Veress needle was introduced at the umbilicus. Intra-abdominal placement was confirmed with low opening pressure and water drop test. Abdomen was insufflated and pneumoperitoneum was obtained.  Skin incision was made superior to the umbilicus. Robotic trocar was introduced.  Additional trocars were placed, 2 robotic trocars to the left of the camera, 1 robotic trocar to the right of the camera and an additional 8 mm assist port to the right of the camera.  The patient was placed in steep Trendelenburg. Robot was docked and operating surgeon reported to the console.        Survey of the abdomen and pelvis revealed the above findings. Pelvic washings were obtained. The posterior leaf of the broad ligament was then opened on the left facilitating access to the retroperitoneum.  The left infundibulopelvic ligament was then skeletonized with good visualization of the ureter beneath, cauterized and transected.  The left uterine-ovarian pedicle was then cauterized and transected and the enlarged left ovarian complex was detached from the uterus. Bilateral ureters were reinspected and both noted to be vermiculating equally and briskly. Pelvis was hemostatic. The robotic trocars were then removed under direct visualization and the robot was undocked. We had placed the left adnexa in an endocatch bag. We performed a contained manual morcellation within the bag via mini-laparotomy in order to extract the specimen.            Pathology:  THE LEFT OVARY SHOWS ADENOCARCINOMA, INTESTINAL PHENOTYPE MEASURING 15 CM, RUPTURED.  FINAL CLASSIFICATION IS PENDING ADDITIONAL STAINS.   INITIAL IMMUNOHISTOCHEMICAL STAINS SHOW THAT THE TUMOR IS POSITIVE FOR CK7, CK 20 AND CDX2.  TUMOR IS NEGATIVE FOR PAX 8 AND WT 1. THE CONTROLS STAIN APPROPRIATELY.   NOTE:  Primary versus metastatic carcinoma based on morphology and immunostain pattern. Will attempt an additional panel of stains to   subclassify. Intestinal phenotype tumors can arise in multiple locations, most commonly the upper and lower gastrointestinal tract and occaisionally   cervix and pancreas. Correlation with endoscopy and imaging may be needed if additional stains do not provide specificity.    THIS CASE WAS REVIEWED BY DR. JAVED WHO CONCURS WITH THE DIAGNOSIS.  VC                Comment: Interp By Chandler Cartwright M.D., Signed on 03/29/2022 at 13:49  Supplemental Diagnosis           THE RESULTS OF MMR IMMUNOSTAINS ARE AS FOLLOWS:   PMS2, MSH6, MSH2 AND MLH1 ALL SHOW INTACT NUCLEAR STAINING.  THE CONTROLS STAIN APPROPRIATELY.   NOTE:  WITH  INTACT NUCLEAR STAINING THE PROBABILITY OF MICROSATELLITE INSTABILITY IS LOW.  HOWEVER, WE ADVISE CORRELATION WITH CLINICAL FINDINGS.            Office Visit    3/29/2022  [facility] - Gyn Onc 2nd Fl  Charmaine Delacruz MD  Gynecologic Oncology            Adenocarcinoma, intestinal type  HPI  S/p RA LSO 3/10/2022  Pathology:  THE LEFT OVARY SHOWS ADENOCARCINOMA, INTESTINAL PHENOTYPE MEASURING 15 CM, RUPTURED.  FINAL CLASSIFICATION IS PENDING ADDITIONAL STAINS.   INITIAL IMMUNOHISTOCHEMICAL STAINS SHOW THAT THE TUMOR IS POSITIVE FOR CK7, CK 20 AND CDX2.  TUMOR IS NEGATIVE FOR PAX 8 AND WT 1. THE CONTROLS STAIN APPROPRIATELY.   NOTE:  Primary versus metastatic carcinoma based on morphology and immunostain pattern. Will attempt an additional panel of stains to   subclassify. Intestinal phenotype tumors can arise in multiple locations, most commonly the upper and lower gastrointestinal tract and occaisionally   cervix and pancreas. Correlation with endoscopy and imaging may be needed if additional stains do not provide specificity.     Presents today for post operative visit and further treatment planning. Recovering appropriately from surgery. Up and about, eating, +BM.  Reports 9/2021 pap normal, she will obtain a copy for me.   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      Referral history:  Self referred for a second opinion regarding pelvic mass and elevated .    She has previously seen Dr. Rupa Taylor and was referred to Dr. Taylor by Dr. Michelle Ramirez.      Assessment:   1.         Adenocarcinoma, intestinal type    Plan:   Orders Placed This Encounter  Procedures             Ambulatory referral/consult to Gastroenterology   Recovering appropriately from surgery.   Ovary showed intestinal type adenocarcinoma. Plan for further work up and evaluation for potential krukenberg tumor with CT pancreas protocol and  EGD/colonoscopy. She will obtain a copy of her normal pap smear for me.   RTC 2-4 weeks.             Review of Systems   Constitutional: Negative.  Negative for activity change, appetite change, fatigue, fever and unexpected weight change.   HENT: Negative.  Negative for dental problem, drooling, mouth sores, sore throat, trouble swallowing and voice change.    Eyes: Negative.    Respiratory: Negative for cough, choking, shortness of breath, wheezing and stridor.    Cardiovascular: Negative for chest pain.   Gastrointestinal: Negative for abdominal distention, abdominal pain, anal bleeding, blood in stool, constipation, diarrhea, nausea, rectal pain, vomiting and fecal incontinence.     Assessment:         Adenocarcinoma, intestinal type  -     Ambulatory referral/consult to Gastroenterology     S/P unilateral salpingo-oophorectomy/mass resection        Plan:        1. Schedule for EGD and Colonoscopy.   2. Probably, then to follow, CT of pancreas.                   Latest Reference Range & Units 03/04/22 10:00 03/10/22 19:26 03/11/22 02:54 03/11/22 12:26 03/12/22 03:07   Hemoglobin 12.0 - 16.0 g/dL 12.4 8.7 (L) 7.3 (L) 8.0 (L) 9.5 (L)   Hematocrit 37.0 - 48.5 % 39.2 27.1 (L) 23.0 (L) 25.3 (L) 30.2 (L)   MCV 82 - 98 fL 96 96 96 98 94   (L): Data is abnormally low        PTA Medications   Medication Sig    acetaminophen (TYLENOL) 650 MG TbSR Take 1 tablet (650 mg total) by mouth every 6 to 8 hours as needed (Pain). Alternate every 3 hours with ibuprofen.    fexofenadine (ALLEGRA) 180 MG tablet Take 180 mg by mouth once daily.    HYDROcodone-acetaminophen (NORCO) 5-325 mg per tablet Take 1 tablet by mouth every 6 (six) hours as needed for Pain.    ibuprofen (ADVIL,MOTRIN) 600 MG tablet Take 1 tablet (600 mg total) by mouth every 6 (six) hours as needed for Pain. Alternate every 3 hours with tylenol       Review of patient's allergies indicates:  No Known Allergies     Past Medical History:   Diagnosis Date     "Anemia 03/11/2022    Mass of left ovary 03/10/2022    JOSE MIGUEL.     LEFT OVARY SHOWS ADENOCARCINOMA, INTESTINAL PHENOTYPE MEASURING 15 CM    S/P unilateral salpingo-oophorectomy/mass resection 03/10/2022     Past Surgical History:   Procedure Laterality Date    ROBOT-ASSISTED LAPAROSCOPIC SALPINGO-OOPHORECTOMY USING DA DARVIN XI Left 3/10/2022    Procedure: XI ROBOTIC SALPINGO-OOPHORECTOMY/ USO;  Surgeon: Charmaine Delacruz MD;  Location: [facility] OR [address] [place] [address];  Service: OB/GYN;  Laterality: Left;     Family History   Problem Relation Age of Onset    Breast cancer Neg Hx     Colon cancer Neg Hx     Ovarian cancer Neg Hx      Social History     Tobacco Use    Smoking status: Never Smoker    Smokeless tobacco: Never Used   Substance Use Topics    Alcohol use: Yes     Comment: twice a month    Drug use: Never         OBJECTIVE:     Vital Signs (Most Recent)  Temp: 97.7 °F (36.5 °C) (04/11/22 1230)  Pulse: 87 (04/11/22 1230)  Resp: 18 (04/11/22 1230)  BP: 114/71 (04/11/22 1230)  SpO2: 100 % (04/11/22 1230)    Physical Exam:  : Ht: 5' 1" (154.9 cm)   Wt: 49 kg (108 lb)   BMI: 20.29 kg/m²                                                         GENERAL:  Comfortable, in no acute distress.                                 HEENT EXAM:  Nonicteric.  No adenopathy.  Oropharynx is clear.               NECK:  Supple.                                                               LUNGS:  Clear.                                                               CARDIAC:  Regular rate and rhythm.  S1, S2.  No murmur.                      ABDOMEN:  Soft, positive bowel sounds, nontender.  No hepatosplenomegaly or masses.  No rebound or guarding.                                             EXTREMITIES:  No edema.     MENTAL STATUS:  Alert and oriented.    ASSESSMENT/PLAN:     Assessment:  Referred by Dr. Delacruz specifically, " for EGD/colonoscopy and CT pancrease protocol for further evaluation of potential krukenberg tumor per pathology as " "above. "   GI wise, she is asymptomatic.    Plan: Gastroscopy and Colonoscopy    Anesthesia Plan:   MAC / General Anaesthesia    ASA Grade: ASA 2 - Patient with mild systemic disease with no functional limitations    MALLAMPATI SCORE: I (soft palate, uvula, fauces, and tonsillar pillars visible)    "

## 2022-04-11 NOTE — [employer] PATIENT INSTRUCTIONS
Discharge Summary/Instructions for after Colonoscopy without   Biopsy/Polypectomy  Francis Nagel    Monday, April 11, 2022  Eric Meléndez MD  RESTRICTIONS ON ACTIVITY:  - Do not drive a car or operate machinery until the day after the procedure.      - The following day: return to full activity including work.  - For  3 days: No heavy lifting, straining or running.  - Diet: You may eat solid foods, but no gassy foods (i.e. beans, broccoli,   cabbage, etc).  TREATMENT FOR COMMON SIDE EFFECTS:  - Mild abdominal pain and bloating or excessive gas: rest, eat lightly and   use a heating pad.  SYMPTOMS TO WATCH FOR AND REPORT TO YOUR PHYSICIAN:  1. Severe abdominal pain.  2. Fever within 24 hours after a procedure.  3. A large amount of rectal bleeding. (A small amount of blood from the   rectum is not serious, especially if hemorrhoids are present.  3.  Because air was put into your colon during the procedure, expelling   large amounts of air from your rectum is normal.  4.  You may not have a bowel movement for 1-3 days because of the   colonoscopy prep.  This is normal.  5.  Call immediately if you notice any of the following:   Chills and/or fever over 101   Persistent vomiting   Severe abdominal pain, other than gas cramps   Severe chest pain   Black, tarry stools   Any bleeding - exceeding one tablespoon  Your doctor recommends these additional instructions:  Eat a high fiber diet.   Keep appointment for the CT scan (computed tomography) of the pancreas as   previously scheduled.   Return to your referring physician, Dr. Delacruz, as previously scheduled.   You have a contact number available for emergencies.  The signs and symptoms   of potential delayed complications were discussed with you.  You may return   to normal activities tomorrow.  Written discharge instructions were   provided to you.   You may return to normal activities tomorrow.   Your physician has recommended a repeat colonoscopy in five  years for   screening purposes.  None  If you have any questions or problems, please call your physician.  EMERGENCY PHONE NUMBER: [phone]  LAB RESULTS: Call in two (2) weeks for lab results, [phone]  ___________________________________________  Nurse Signature  ___________________________________________  Patient/Designated Responsible Party Signature  Eric Meléndez MD  4/11/2022 2:12:36 PM  This report has been verified and signed electronically.  Dear patient,  As a result of recent federal legislation (The Federal Cures Act), you may   receive lab or pathology results from your procedure in your [facility]   account before your physician is able to contact you. Your physician or   their representative will relay the results to you with their   recommendations at their soonest availability.  Thank you.  [employer]

## 2022-04-12 VITALS
RESPIRATION RATE: 16 BRPM | OXYGEN SATURATION: 100 % | HEART RATE: 78 BPM | WEIGHT: 108 LBS | TEMPERATURE: 98 F | DIASTOLIC BLOOD PRESSURE: 70 MMHG | HEIGHT: 61 IN | SYSTOLIC BLOOD PRESSURE: 114 MMHG | BODY MASS INDEX: 20.39 KG/M2

## 2022-04-13 DIAGNOSIS — C16.9: Primary | ICD-10-CM

## 2022-04-14 ENCOUNTER — TELEPHONE (OUTPATIENT)
Dept: GYNECOLOGIC ONCOLOGY | Facility: CLINIC | Age: 32
End: 2022-04-14
Payer: COMMERCIAL

## 2022-04-14 NOTE — TELEPHONE ENCOUNTER
----- Message from Charmaine Delacruz MD sent at 4/13/2022  4:44 PM CDT -----  Regarding: schedule PET  Please schedule patient for PET.

## 2022-04-18 LAB
FINAL PATHOLOGIC DIAGNOSIS: NORMAL
GROSS: NORMAL
Lab: NORMAL

## 2022-04-19 ENCOUNTER — TELEPHONE (OUTPATIENT)
Dept: HEMATOLOGY/ONCOLOGY | Facility: CLINIC | Age: 32
End: 2022-04-19
Payer: COMMERCIAL

## 2022-04-19 NOTE — NURSING
Chart reviewed.  Pt being seen by Dr. Delacruz at [facility].  No f/u appts needed with Dr. Taylor.

## 2022-04-21 ENCOUNTER — HOSPITAL ENCOUNTER (OUTPATIENT)
Dept: RADIOLOGY | Facility: HOSPITAL | Age: 32
Discharge: HOME | End: 2022-04-21
Attending: OBSTETRICS & GYNECOLOGY
Payer: COMMERCIAL

## 2022-04-21 DIAGNOSIS — C16.9: ICD-10-CM

## 2022-04-21 PROCEDURE — 78815 PET IMAGE W/CT SKULL-THIGH: CPT | Mod: 26,PI,, | Performed by: RADIOLOGY

## 2022-04-21 PROCEDURE — 78815 NM PET CT ROUTINE: ICD-10-PCS | Mod: 26,PI,, | Performed by: RADIOLOGY

## 2022-04-21 PROCEDURE — 78815 PET IMAGE W/CT SKULL-THIGH: CPT | Mod: TC

## 2022-04-22 ENCOUNTER — TELEPHONE (OUTPATIENT)
Dept: GYNECOLOGIC ONCOLOGY | Facility: CLINIC | Age: 32
End: 2022-04-22
Payer: COMMERCIAL

## 2022-04-24 ENCOUNTER — PATIENT MESSAGE (OUTPATIENT)
Dept: GYNECOLOGIC ONCOLOGY | Facility: CLINIC | Age: 32
End: 2022-04-24
Payer: COMMERCIAL

## 2022-04-25 ENCOUNTER — TELEPHONE (OUTPATIENT)
Dept: GYNECOLOGIC ONCOLOGY | Facility: CLINIC | Age: 32
End: 2022-04-25
Payer: COMMERCIAL

## 2022-04-25 DIAGNOSIS — R97.1 ELEVATED CA-125: ICD-10-CM

## 2022-04-25 DIAGNOSIS — R19.00 PELVIC MASS: ICD-10-CM

## 2022-04-25 DIAGNOSIS — N83.209 CYST OF OVARY, UNSPECIFIED LATERALITY: Primary | ICD-10-CM

## 2022-05-05 ENCOUNTER — HOSPITAL ENCOUNTER (OUTPATIENT)
Dept: PREADMISSION TESTING | Facility: OTHER | Age: 32
Discharge: HOME | End: 2022-05-05
Attending: OBSTETRICS & GYNECOLOGY
Payer: COMMERCIAL

## 2022-05-05 ENCOUNTER — TELEPHONE (OUTPATIENT)
Dept: GYNECOLOGIC ONCOLOGY | Facility: CLINIC | Age: 32
End: 2022-05-05
Payer: COMMERCIAL

## 2022-05-05 ENCOUNTER — OFFICE VISIT (OUTPATIENT)
Dept: GYNECOLOGIC ONCOLOGY | Facility: CLINIC | Age: 32
End: 2022-05-05
Payer: COMMERCIAL

## 2022-05-05 ENCOUNTER — ANESTHESIA EVENT (OUTPATIENT)
Dept: SURGERY | Facility: OTHER | Age: 32
End: 2022-05-05
Payer: COMMERCIAL

## 2022-05-05 VITALS
SYSTOLIC BLOOD PRESSURE: 119 MMHG | HEART RATE: 82 BPM | DIASTOLIC BLOOD PRESSURE: 81 MMHG | WEIGHT: 107 LBS | BODY MASS INDEX: 20.22 KG/M2

## 2022-05-05 DIAGNOSIS — C56.9 MALIGNANT NEOPLASM OF OVARY, UNSPECIFIED LATERALITY: ICD-10-CM

## 2022-05-05 DIAGNOSIS — C16.9: Primary | ICD-10-CM

## 2022-05-05 PROCEDURE — 99999 PR PBB SHADOW E&M-EST. PATIENT-LVL III: ICD-10-PCS | Mod: PBBFAC,,, | Performed by: OBSTETRICS & GYNECOLOGY

## 2022-05-05 PROCEDURE — 3079F PR MOST RECENT DIASTOLIC BLOOD PRESSURE 80-89 MM HG: ICD-10-PCS | Mod: CPTII,S$GLB,, | Performed by: OBSTETRICS & GYNECOLOGY

## 2022-05-05 PROCEDURE — 3074F SYST BP LT 130 MM HG: CPT | Mod: CPTII,S$GLB,, | Performed by: OBSTETRICS & GYNECOLOGY

## 2022-05-05 PROCEDURE — 99999 PR PBB SHADOW E&M-EST. PATIENT-LVL III: CPT | Mod: PBBFAC,,, | Performed by: OBSTETRICS & GYNECOLOGY

## 2022-05-05 PROCEDURE — 3074F PR MOST RECENT SYSTOLIC BLOOD PRESSURE < 130 MM HG: ICD-10-PCS | Mod: CPTII,S$GLB,, | Performed by: OBSTETRICS & GYNECOLOGY

## 2022-05-05 PROCEDURE — 99214 OFFICE O/P EST MOD 30 MIN: CPT | Mod: S$GLB,,, | Performed by: OBSTETRICS & GYNECOLOGY

## 2022-05-05 PROCEDURE — 3079F DIAST BP 80-89 MM HG: CPT | Mod: CPTII,S$GLB,, | Performed by: OBSTETRICS & GYNECOLOGY

## 2022-05-05 PROCEDURE — 99214 PR OFFICE/OUTPT VISIT, EST, LEVL IV, 30-39 MIN: ICD-10-PCS | Mod: S$GLB,,, | Performed by: OBSTETRICS & GYNECOLOGY

## 2022-05-05 PROCEDURE — 3008F BODY MASS INDEX DOCD: CPT | Mod: CPTII,S$GLB,, | Performed by: OBSTETRICS & GYNECOLOGY

## 2022-05-05 PROCEDURE — 3008F PR BODY MASS INDEX (BMI) DOCUMENTED: ICD-10-PCS | Mod: CPTII,S$GLB,, | Performed by: OBSTETRICS & GYNECOLOGY

## 2022-05-05 RX ORDER — LIDOCAINE HYDROCHLORIDE 10 MG/ML
0.5 INJECTION, SOLUTION EPIDURAL; INFILTRATION; INTRACAUDAL; PERINEURAL ONCE
Status: CANCELLED | OUTPATIENT
Start: 2022-05-05 | End: 2022-05-05

## 2022-05-05 RX ORDER — SCOLOPAMINE TRANSDERMAL SYSTEM 1 MG/1
1 PATCH, EXTENDED RELEASE TRANSDERMAL
Status: CANCELLED | OUTPATIENT
Start: 2022-05-05

## 2022-05-05 RX ORDER — SODIUM CHLORIDE, SODIUM LACTATE, POTASSIUM CHLORIDE, CALCIUM CHLORIDE 600; 310; 30; 20 MG/100ML; MG/100ML; MG/100ML; MG/100ML
INJECTION, SOLUTION INTRAVENOUS CONTINUOUS
Status: CANCELLED | OUTPATIENT
Start: 2022-05-05

## 2022-05-05 NOTE — TELEPHONE ENCOUNTER
----- Message from Zahra Olea sent at 5/5/2022 10:34 AM CDT -----  Regarding: running late  Name of Who is Calling: TITO STAHL [[id number]]      What is the request in detail: Patient was calling to say she is running late she is on her way she states she will be about 20 minutes late       Can the clinic reply by [facility]: no      What Number to Call Back if not in [phone]:

## 2022-05-05 NOTE — DISCHARGE INSTRUCTIONS
Information to Prepare you for your Surgery    PRE-ADMIT TESTING -  [phone]    [address] [facility]          Your surgery has been scheduled at [facility]. We are pleased to have the opportunity to serve you. For Further Information please call [phone].    On the day of surgery please report to the Information Desk on the 1st floor.    CONTACT YOUR PHYSICIAN'S OFFICE THE DAY PRIOR TO YOUR SURGERY TO OBTAIN YOUR ARRIVAL TIME.     The evening before surgery do not eat anything after 9 p.m. ( this includes hard candy, chewing gum and mints).  You may only have GATORADE, POWERADE AND WATER  from 9 p.m. until you leave your home.   DO NOT DRINK ANY LIQUIDS ON THE WAY TO THE HOSPITAL.      Why does your anesthesiologist allow you to drink Gatorade/Powerade before surgery?  Gatorade/Powerade helps to increase your comfort before surgery and to decrease your nausea after surgery. The carbohydrates in Gatorade/Powerade help reduce your body's stress response to surgery.  If you are a diabetic-drink only water prior to surgery.      Current Visitor policy(12/27/2021) - Patients may have 2 visitors pre and post procedure. Only 2 visitors will be allowed in the Surgical building with the patient.     SPECIAL MEDICATION INSTRUCTIONS: TAKE medications checked off by the Anesthesiologist on your Medication List.    Angiogram Patients: Take medications as instructed by your physician, including aspirin.     Surgery Patients:    If you take ASPIRIN - Your PHYSICIAN/SURGEON will need to inform you IF/OR when you need to stop taking aspirin prior to your surgery.     Do Not take any medications containing IBUPROFEN.    Do Not Wear any make-up (especially eye make-up) to surgery. Please remove any false eyelashes or eyelash extensions. If you arrive the day of surgery with makeup/eyelashes on you will be required to remove prior to surgery. (There is a risk of corneal  abrasions if eye makeup/eyelash extensions are not removed)      Leave all valuables at home.   Do Not wear any jewelry or watches, including any metal in body piercings. Jewelry must be removed prior to coming to the hospital.  There is a possibility that rings that are unable to be removed may be cut off if they are on the surgical extremity.    Please remove all hair extensions, wigs, clips and any other metal accessories/ ornaments from your hair.  These items may pose a flammable/fire risk in Surgery and must be removed.    Do not shave your surgical area at least 5 days prior to your surgery. The surgical prep will be performed at the hospital according to Infection Control regulations.    Contact Lens must be removed before surgery. Either do not wear the contact lens or bring a case and solution for storage.  Please bring a container for eyeglasses or dentures as required.  Bring any paperwork your physician has provided, such as consent forms,  history and physicals, doctor's orders, etc.   Bring comfortable clothes that are loose fitting to wear upon discharge. Take into consideration the type of surgery being performed.  Maintain your diet as advised per your physician the day prior to surgery.      Adequate rest the night before surgery is advised.   Park in the Parking lot behind the hospital or in the [employer] [facility] across the street from the parking lot. Parking is complimentary.  If you will be discharged the same day as your procedure, please arrange for a responsible adult to drive you home or to accompany you if traveling by taxi.   YOU WILL NOT BE PERMITTED TO DRIVE OR TO LEAVE THE HOSPITAL ALONE AFTER SURGERY.   If you are being discharged the same day, it is strongly recommended that you arrange for someone to remain with you for the first 24 hrs following your surgery.    The Surgeon will speak to your family/visitor after your surgery regarding the outcome of your surgery and post op  care.  The Surgeon may speak to you after your surgery, but there is a possibility you may not remember the details.  Please check with your family members regarding the conversation with the Surgeon.    We strongly recommend whoever is bringing you home be present for discharge instructions.  This will ensure a thorough understanding for your post op home care.    ALL CHILDREN MUST ALWAYS BE ACCOMPANIED BY AN ADULT.    Visitors-Refer to current Visitor policy handouts.    Thank you for your cooperation.  The Staff of [facility].            Bathing Instructions with Hibiclens    Shower the evening before and morning of your procedure with Hibiclens:  Wash your face with water and your regular face wash/soap  Apply Hibiclens directly on your skin or on a wet washcloth and wash gently. When showering: Move away from the shower stream when applying Hibiclens to avoid rinsing off too soon.  Rinse thoroughly with warm water  Do not dilute Hibiclens        Dry off as usual, do not use any deodorant, powder, body lotions, perfume, after shave or cologne.

## 2022-05-05 NOTE — ANESTHESIA PREPROCEDURE EVALUATION
05/05/2022  Francis Nagel is a 31 y.o., female.      Pre-op Assessment    I have reviewed the Patient Summary Reports.     I have reviewed the Nursing Notes. I have reviewed the NPO Status.   I have reviewed the Medications.     Review of Systems  Anesthesia Hx:  Hx of Anesthetic complications  Denies Family Hx of Anesthesia complications.  Personal Hx of Anesthesia complications, Post-Operative Nausea/Vomiting   Social:  Non-Smoker    Hematology/Oncology:         -- Anemia: Current/Recent Cancer. Other (see Oncology comments) Oncology Comments: Pelvic mass  Elevated CA-125  Hyponatremia  S/P unilateral salpingo-oophorectomy/mass resection  Anemia  Metastatic cancer        EENT/Dental:EENT/Dental Normal   Cardiovascular:  Cardiovascular Normal     Pulmonary:  Pulmonary Normal    Renal/:  Renal/ Normal     Hepatic/GI:  Hepatic/GI Normal    Musculoskeletal:  Musculoskeletal Normal    Neurological:  Neurology Normal    Endocrine:  Endocrine Normal    Dermatological:  Skin Normal    Psych:  Psychiatric Normal           Physical Exam    Airway:  Mallampati: II           Anesthesia Plan  Type of Anesthesia, risks & benefits discussed:    Anesthesia Type: Gen ETT  Intra-op Monitoring Plan: Standard ASA Monitors  Post Op Pain Control Plan: multimodal analgesia  Induction:  IV  Airway Plan: Video  Informed Consent: Informed consent signed with the Patient and all parties understand the risks and agree with anesthesia plan.  All questions answered.   ASA Score: 2  Anesthesia Plan Notes: Recent transfusion post op robotic salpingo-oopherectomy    Will obtain cbc and t&s day of surgery    Day of surgery update: hb 12.1    Ready For Surgery From Anesthesia Perspective.     .

## 2022-05-12 ENCOUNTER — TELEPHONE (OUTPATIENT)
Dept: GYNECOLOGIC ONCOLOGY | Facility: CLINIC | Age: 32
End: 2022-05-12
Payer: COMMERCIAL

## 2022-05-13 ENCOUNTER — HOSPITAL ENCOUNTER (OUTPATIENT)
Facility: OTHER | Age: 32
LOS: 1 days | Discharge: HOME | End: 2022-05-13
Attending: OBSTETRICS & GYNECOLOGY | Admitting: OBSTETRICS & GYNECOLOGY
Payer: COMMERCIAL

## 2022-05-13 ENCOUNTER — ANESTHESIA (OUTPATIENT)
Dept: SURGERY | Facility: OTHER | Age: 32
End: 2022-05-13
Payer: COMMERCIAL

## 2022-05-13 DIAGNOSIS — Z01.818 PREOP TESTING: Primary | ICD-10-CM

## 2022-05-13 DIAGNOSIS — Z90.721 S/P UNILATERAL SALPINGO-OOPHORECTOMY: ICD-10-CM

## 2022-05-13 DIAGNOSIS — R19.00 PELVIC MASS: ICD-10-CM

## 2022-05-13 PROBLEM — Z90.710 STATUS POST HYSTERECTOMY: Status: ACTIVE | Noted: 2022-05-13

## 2022-05-13 LAB
ABO + RH BLD: NORMAL
B-HCG UR QL: NEGATIVE
BASOPHILS # BLD AUTO: 0.02 K/UL (ref 0–0.2)
BASOPHILS NFR BLD: 0.3 % (ref 0–1.9)
BLD GP AB SCN CELLS X3 SERPL QL: NORMAL
CTP QC/QA: YES
DIFFERENTIAL METHOD: ABNORMAL
EOSINOPHIL # BLD AUTO: 0.2 K/UL (ref 0–0.5)
EOSINOPHIL NFR BLD: 2.1 % (ref 0–8)
ERYTHROCYTE [DISTWIDTH] IN BLOOD BY AUTOMATED COUNT: 11.9 % (ref 11.5–14.5)
HCT VFR BLD AUTO: 35.9 % (ref 37–48.5)
HGB BLD-MCNC: 12.1 G/DL (ref 12–16)
IMM GRANULOCYTES # BLD AUTO: 0.02 K/UL (ref 0–0.04)
IMM GRANULOCYTES NFR BLD AUTO: 0.3 % (ref 0–0.5)
LYMPHOCYTES # BLD AUTO: 1.8 K/UL (ref 1–4.8)
LYMPHOCYTES NFR BLD: 25.7 % (ref 18–48)
MCH RBC QN AUTO: 31.8 PG (ref 27–31)
MCHC RBC AUTO-ENTMCNC: 33.7 G/DL (ref 32–36)
MCV RBC AUTO: 94 FL (ref 82–98)
MONOCYTES # BLD AUTO: 0.6 K/UL (ref 0.3–1)
MONOCYTES NFR BLD: 8.2 % (ref 4–15)
NEUTROPHILS # BLD AUTO: 4.5 K/UL (ref 1.8–7.7)
NEUTROPHILS NFR BLD: 63.4 % (ref 38–73)
NRBC BLD-RTO: 0 /100 WBC
PLATELET # BLD AUTO: 254 K/UL (ref 150–450)
PMV BLD AUTO: 8.6 FL (ref 9.2–12.9)
POCT GLUCOSE: 78 MG/DL (ref 70–110)
RBC # BLD AUTO: 3.81 M/UL (ref 4–5.4)
WBC # BLD AUTO: 7.09 K/UL (ref 3.9–12.7)

## 2022-05-13 PROCEDURE — 58571 PR LAPAROSCOPY W TOT HYSTERECTUTERUS <=250 GRAM  W TUBE/OVARY: ICD-10-PCS | Mod: AS,,, | Performed by: PHYSICIAN ASSISTANT

## 2022-05-13 PROCEDURE — 88304 PR  SURG PATH,LEVEL III: ICD-10-PCS | Mod: 26,,, | Performed by: PATHOLOGY

## 2022-05-13 PROCEDURE — 88305 TISSUE EXAM BY PATHOLOGIST: ICD-10-PCS | Mod: 26,,, | Performed by: PATHOLOGY

## 2022-05-13 PROCEDURE — 88307 PR  SURG PATH,LEVEL V: ICD-10-PCS | Mod: 26,,, | Performed by: PATHOLOGY

## 2022-05-13 PROCEDURE — 85025 COMPLETE CBC W/AUTO DIFF WBC: CPT | Performed by: OBSTETRICS & GYNECOLOGY

## 2022-05-13 PROCEDURE — 63600175 PHARM REV CODE 636 W HCPCS: Performed by: ANESTHESIOLOGY

## 2022-05-13 PROCEDURE — 86850 RBC ANTIBODY SCREEN: CPT | Performed by: OBSTETRICS & GYNECOLOGY

## 2022-05-13 PROCEDURE — 88305 TISSUE EXAM BY PATHOLOGIST: CPT | Mod: 59 | Performed by: PATHOLOGY

## 2022-05-13 PROCEDURE — 63600175 PHARM REV CODE 636 W HCPCS: Performed by: STUDENT IN AN ORGANIZED HEALTH CARE EDUCATION/TRAINING PROGRAM

## 2022-05-13 PROCEDURE — 25000003 PHARM REV CODE 250: Performed by: STUDENT IN AN ORGANIZED HEALTH CARE EDUCATION/TRAINING PROGRAM

## 2022-05-13 PROCEDURE — 58571 TLH W/T/O 250 G OR LESS: CPT | Mod: AS,,, | Performed by: PHYSICIAN ASSISTANT

## 2022-05-13 PROCEDURE — 38573 LAPS PELVIC LYMPHADEC: CPT | Mod: 51,,, | Performed by: OBSTETRICS & GYNECOLOGY

## 2022-05-13 PROCEDURE — 88112 PR  CYTOPATH, CELL ENHANCE TECH: ICD-10-PCS | Mod: 26,,, | Performed by: PATHOLOGY

## 2022-05-13 PROCEDURE — 25000003 PHARM REV CODE 250: Performed by: NURSE ANESTHETIST, CERTIFIED REGISTERED

## 2022-05-13 PROCEDURE — 88304 TISSUE EXAM BY PATHOLOGIST: CPT | Performed by: PATHOLOGY

## 2022-05-13 PROCEDURE — 88309 TISSUE EXAM BY PATHOLOGIST: CPT | Performed by: PATHOLOGY

## 2022-05-13 PROCEDURE — 86900 BLOOD TYPING SEROLOGIC ABO: CPT | Performed by: OBSTETRICS & GYNECOLOGY

## 2022-05-13 PROCEDURE — 38573 LAPS PELVIC LYMPHADEC: CPT | Mod: AS,51,, | Performed by: PHYSICIAN ASSISTANT

## 2022-05-13 PROCEDURE — 88309 TISSUE EXAM BY PATHOLOGIST: CPT | Mod: 26,,, | Performed by: PATHOLOGY

## 2022-05-13 PROCEDURE — 88112 CYTOPATH CELL ENHANCE TECH: CPT | Mod: 26,,, | Performed by: PATHOLOGY

## 2022-05-13 PROCEDURE — 88309 PR  SURG PATH,LEVEL VI: ICD-10-PCS | Mod: 26,,, | Performed by: PATHOLOGY

## 2022-05-13 PROCEDURE — 71000015 HC POSTOP RECOV 1ST HR: Performed by: OBSTETRICS & GYNECOLOGY

## 2022-05-13 PROCEDURE — 81025 URINE PREGNANCY TEST: CPT | Performed by: ANESTHESIOLOGY

## 2022-05-13 PROCEDURE — 88307 TISSUE EXAM BY PATHOLOGIST: CPT | Mod: 26,,, | Performed by: PATHOLOGY

## 2022-05-13 PROCEDURE — 71000033 HC RECOVERY, INTIAL HOUR: Performed by: OBSTETRICS & GYNECOLOGY

## 2022-05-13 PROCEDURE — 27201423 OPTIME MED/SURG SUP & DEVICES STERILE SUPPLY: Performed by: OBSTETRICS & GYNECOLOGY

## 2022-05-13 PROCEDURE — 71000016 HC POSTOP RECOV ADDL HR: Performed by: OBSTETRICS & GYNECOLOGY

## 2022-05-13 PROCEDURE — 88305 TISSUE EXAM BY PATHOLOGIST: CPT | Mod: 26,,, | Performed by: PATHOLOGY

## 2022-05-13 PROCEDURE — 38573 PR LAPAROSCOPY, W/PELVIC LYMPHADENECTOMY: ICD-10-PCS | Mod: 51,,, | Performed by: OBSTETRICS & GYNECOLOGY

## 2022-05-13 PROCEDURE — 25000003 PHARM REV CODE 250: Performed by: ANESTHESIOLOGY

## 2022-05-13 PROCEDURE — 44970 PR LAP,APPENDECTOMY: ICD-10-PCS | Mod: AS,59,, | Performed by: PHYSICIAN ASSISTANT

## 2022-05-13 PROCEDURE — 44970 LAPAROSCOPY APPENDECTOMY: CPT | Mod: 59,,, | Performed by: OBSTETRICS & GYNECOLOGY

## 2022-05-13 PROCEDURE — 58571 TLH W/T/O 250 G OR LESS: CPT | Mod: ,,, | Performed by: OBSTETRICS & GYNECOLOGY

## 2022-05-13 PROCEDURE — 36000713 HC OR TIME LEV V EA ADD 15 MIN: Performed by: OBSTETRICS & GYNECOLOGY

## 2022-05-13 PROCEDURE — 44970 LAPAROSCOPY APPENDECTOMY: CPT | Mod: AS,59,, | Performed by: PHYSICIAN ASSISTANT

## 2022-05-13 PROCEDURE — 38573 PR LAPAROSCOPY, W/PELVIC LYMPHADENECTOMY: ICD-10-PCS | Mod: AS,51,, | Performed by: PHYSICIAN ASSISTANT

## 2022-05-13 PROCEDURE — 37000008 HC ANESTHESIA 1ST 15 MINUTES: Performed by: OBSTETRICS & GYNECOLOGY

## 2022-05-13 PROCEDURE — 36415 COLL VENOUS BLD VENIPUNCTURE: CPT | Performed by: OBSTETRICS & GYNECOLOGY

## 2022-05-13 PROCEDURE — 36000712 HC OR TIME LEV V 1ST 15 MIN: Performed by: OBSTETRICS & GYNECOLOGY

## 2022-05-13 PROCEDURE — 88305 TISSUE EXAM BY PATHOLOGIST: CPT | Performed by: PATHOLOGY

## 2022-05-13 PROCEDURE — 44970 PR LAP,APPENDECTOMY: ICD-10-PCS | Mod: 59,,, | Performed by: OBSTETRICS & GYNECOLOGY

## 2022-05-13 PROCEDURE — 63600175 PHARM REV CODE 636 W HCPCS: Performed by: NURSE ANESTHETIST, CERTIFIED REGISTERED

## 2022-05-13 PROCEDURE — 88112 CYTOPATH CELL ENHANCE TECH: CPT | Performed by: PATHOLOGY

## 2022-05-13 PROCEDURE — 88307 TISSUE EXAM BY PATHOLOGIST: CPT | Performed by: PATHOLOGY

## 2022-05-13 PROCEDURE — 71000039 HC RECOVERY, EACH ADD'L HOUR: Performed by: OBSTETRICS & GYNECOLOGY

## 2022-05-13 PROCEDURE — 37000009 HC ANESTHESIA EA ADD 15 MINS: Performed by: OBSTETRICS & GYNECOLOGY

## 2022-05-13 PROCEDURE — 88304 TISSUE EXAM BY PATHOLOGIST: CPT | Mod: 26,,, | Performed by: PATHOLOGY

## 2022-05-13 PROCEDURE — 58571 PR LAPAROSCOPY W TOT HYSTERECTUTERUS <=250 GRAM  W TUBE/OVARY: ICD-10-PCS | Mod: ,,, | Performed by: OBSTETRICS & GYNECOLOGY

## 2022-05-13 RX ORDER — ONDANSETRON HYDROCHLORIDE 2 MG/ML
INJECTION, SOLUTION INTRAMUSCULAR; INTRAVENOUS
Status: DISCONTINUED | OUTPATIENT
Start: 2022-05-13 | End: 2022-05-13

## 2022-05-13 RX ORDER — PROCHLORPERAZINE EDISYLATE 5 MG/ML
5 INJECTION INTRAMUSCULAR; INTRAVENOUS EVERY 30 MIN PRN
Status: DISCONTINUED | OUTPATIENT
Start: 2022-05-13 | End: 2022-05-13 | Stop reason: HOSPADM

## 2022-05-13 RX ORDER — FENTANYL CITRATE 50 UG/ML
INJECTION, SOLUTION INTRAMUSCULAR; INTRAVENOUS
Status: DISCONTINUED | OUTPATIENT
Start: 2022-05-13 | End: 2022-05-13

## 2022-05-13 RX ORDER — OXYCODONE HYDROCHLORIDE 5 MG/1
5 TABLET ORAL
Status: DISCONTINUED | OUTPATIENT
Start: 2022-05-13 | End: 2022-05-13 | Stop reason: HOSPADM

## 2022-05-13 RX ORDER — PROPOFOL 10 MG/ML
VIAL (ML) INTRAVENOUS
Status: DISCONTINUED | OUTPATIENT
Start: 2022-05-13 | End: 2022-05-13

## 2022-05-13 RX ORDER — LIDOCAINE HYDROCHLORIDE 10 MG/ML
0.5 INJECTION, SOLUTION EPIDURAL; INFILTRATION; INTRACAUDAL; PERINEURAL ONCE
Status: DISCONTINUED | OUTPATIENT
Start: 2022-05-13 | End: 2022-05-13 | Stop reason: HOSPADM

## 2022-05-13 RX ORDER — HYDROMORPHONE HYDROCHLORIDE 2 MG/ML
0.4 INJECTION, SOLUTION INTRAMUSCULAR; INTRAVENOUS; SUBCUTANEOUS EVERY 5 MIN PRN
Status: DISCONTINUED | OUTPATIENT
Start: 2022-05-13 | End: 2022-05-13 | Stop reason: HOSPADM

## 2022-05-13 RX ORDER — CEFAZOLIN SODIUM 1 G/3ML
2 INJECTION, POWDER, FOR SOLUTION INTRAMUSCULAR; INTRAVENOUS
Status: COMPLETED | OUTPATIENT
Start: 2022-05-13 | End: 2022-05-13

## 2022-05-13 RX ORDER — METOCLOPRAMIDE HYDROCHLORIDE 5 MG/ML
INJECTION INTRAMUSCULAR; INTRAVENOUS
Status: DISCONTINUED | OUTPATIENT
Start: 2022-05-13 | End: 2022-05-13

## 2022-05-13 RX ORDER — ROCURONIUM BROMIDE 10 MG/ML
INJECTION, SOLUTION INTRAVENOUS
Status: DISCONTINUED | OUTPATIENT
Start: 2022-05-13 | End: 2022-05-13

## 2022-05-13 RX ORDER — SODIUM CHLORIDE, SODIUM LACTATE, POTASSIUM CHLORIDE, CALCIUM CHLORIDE 600; 310; 30; 20 MG/100ML; MG/100ML; MG/100ML; MG/100ML
INJECTION, SOLUTION INTRAVENOUS CONTINUOUS
Status: DISCONTINUED | OUTPATIENT
Start: 2022-05-13 | End: 2022-05-13 | Stop reason: HOSPADM

## 2022-05-13 RX ORDER — PHENYLEPHRINE HYDROCHLORIDE 10 MG/ML
INJECTION INTRAVENOUS
Status: DISCONTINUED | OUTPATIENT
Start: 2022-05-13 | End: 2022-05-13

## 2022-05-13 RX ORDER — SODIUM CHLORIDE 9 MG/ML
INJECTION, SOLUTION INTRAVENOUS CONTINUOUS
Status: DISCONTINUED | OUTPATIENT
Start: 2022-05-13 | End: 2022-05-13 | Stop reason: HOSPADM

## 2022-05-13 RX ORDER — LIDOCAINE HCL/PF 100 MG/5ML
SYRINGE (ML) INTRAVENOUS
Status: DISCONTINUED | OUTPATIENT
Start: 2022-05-13 | End: 2022-05-13

## 2022-05-13 RX ORDER — SODIUM CHLORIDE 0.9 % (FLUSH) 0.9 %
3 SYRINGE (ML) INJECTION
Status: DISCONTINUED | OUTPATIENT
Start: 2022-05-13 | End: 2022-05-13 | Stop reason: HOSPADM

## 2022-05-13 RX ORDER — DEXAMETHASONE SODIUM PHOSPHATE 4 MG/ML
INJECTION, SOLUTION INTRA-ARTICULAR; INTRALESIONAL; INTRAMUSCULAR; INTRAVENOUS; SOFT TISSUE
Status: DISCONTINUED | OUTPATIENT
Start: 2022-05-13 | End: 2022-05-13

## 2022-05-13 RX ORDER — SCOLOPAMINE TRANSDERMAL SYSTEM 1 MG/1
1 PATCH, EXTENDED RELEASE TRANSDERMAL
Status: DISCONTINUED | OUTPATIENT
Start: 2022-05-13 | End: 2022-05-13 | Stop reason: HOSPADM

## 2022-05-13 RX ORDER — MIDAZOLAM HYDROCHLORIDE 1 MG/ML
INJECTION INTRAMUSCULAR; INTRAVENOUS
Status: DISCONTINUED | OUTPATIENT
Start: 2022-05-13 | End: 2022-05-13

## 2022-05-13 RX ORDER — HYDROCODONE BITARTRATE AND ACETAMINOPHEN 5; 325 MG/1; MG/1
1 TABLET ORAL EVERY 6 HOURS PRN
Qty: 20 TABLET | Refills: 0 | Status: ON HOLD | OUTPATIENT
Start: 2022-05-13 | End: 2022-05-24 | Stop reason: SDUPTHER

## 2022-05-13 RX ORDER — MUPIROCIN 20 MG/G
OINTMENT TOPICAL
Status: DISCONTINUED | OUTPATIENT
Start: 2022-05-13 | End: 2022-05-13 | Stop reason: HOSPADM

## 2022-05-13 RX ORDER — DIPHENHYDRAMINE HYDROCHLORIDE 50 MG/ML
12.5 INJECTION INTRAMUSCULAR; INTRAVENOUS EVERY 30 MIN PRN
Status: DISCONTINUED | OUTPATIENT
Start: 2022-05-13 | End: 2022-05-13 | Stop reason: HOSPADM

## 2022-05-13 RX ORDER — ACETAMINOPHEN 10 MG/ML
1000 INJECTION, SOLUTION INTRAVENOUS ONCE
Status: COMPLETED | OUTPATIENT
Start: 2022-05-13 | End: 2022-05-13

## 2022-05-13 RX ORDER — KETOROLAC TROMETHAMINE 30 MG/ML
30 INJECTION, SOLUTION INTRAMUSCULAR; INTRAVENOUS ONCE
Status: COMPLETED | OUTPATIENT
Start: 2022-05-13 | End: 2022-05-13

## 2022-05-13 RX ORDER — IBUPROFEN 600 MG/1
600 TABLET ORAL 3 TIMES DAILY
Qty: 30 TABLET | Refills: 2 | Status: SHIPPED | OUTPATIENT
Start: 2022-05-13 | End: 2022-06-29

## 2022-05-13 RX ADMIN — CARBOXYMETHYLCELLULOSE SODIUM 2 DROP: 2.5 SOLUTION/ DROPS OPHTHALMIC at 07:05

## 2022-05-13 RX ADMIN — PROPOFOL 150 MG: 10 INJECTION, EMULSION INTRAVENOUS at 07:05

## 2022-05-13 RX ADMIN — HYDROMORPHONE HYDROCHLORIDE 0.4 MG: 2 INJECTION INTRAMUSCULAR; INTRAVENOUS; SUBCUTANEOUS at 10:05

## 2022-05-13 RX ADMIN — ROCURONIUM BROMIDE 40 MG: 10 INJECTION, SOLUTION INTRAVENOUS at 07:05

## 2022-05-13 RX ADMIN — ACETAMINOPHEN 1000 MG: 10 INJECTION INTRAVENOUS at 11:05

## 2022-05-13 RX ADMIN — FENTANYL CITRATE 50 MCG: 50 INJECTION, SOLUTION INTRAMUSCULAR; INTRAVENOUS at 07:05

## 2022-05-13 RX ADMIN — METOCLOPRAMIDE 10 MG: 5 INJECTION, SOLUTION INTRAMUSCULAR; INTRAVENOUS at 07:05

## 2022-05-13 RX ADMIN — LIDOCAINE HYDROCHLORIDE 50 MG: 20 INJECTION, SOLUTION INTRAVENOUS at 07:05

## 2022-05-13 RX ADMIN — KETOROLAC TROMETHAMINE 30 MG: 30 INJECTION, SOLUTION INTRAMUSCULAR at 11:05

## 2022-05-13 RX ADMIN — PROPOFOL 50 MG: 10 INJECTION, EMULSION INTRAVENOUS at 10:05

## 2022-05-13 RX ADMIN — DEXAMETHASONE SODIUM PHOSPHATE 8 MG: 4 INJECTION, SOLUTION INTRAMUSCULAR; INTRAVENOUS at 07:05

## 2022-05-13 RX ADMIN — MUPIROCIN: 20 OINTMENT TOPICAL at 06:05

## 2022-05-13 RX ADMIN — CEFAZOLIN 2 G: 330 INJECTION, POWDER, FOR SOLUTION INTRAMUSCULAR; INTRAVENOUS at 10:05

## 2022-05-13 RX ADMIN — SODIUM CHLORIDE, SODIUM LACTATE, POTASSIUM CHLORIDE, AND CALCIUM CHLORIDE: 600; 310; 30; 20 INJECTION, SOLUTION INTRAVENOUS at 08:05

## 2022-05-13 RX ADMIN — SCOLOPAMINE TRANSDERMAL SYSTEM 1 PATCH: 1 PATCH, EXTENDED RELEASE TRANSDERMAL at 06:05

## 2022-05-13 RX ADMIN — SODIUM CHLORIDE, SODIUM LACTATE, POTASSIUM CHLORIDE, AND CALCIUM CHLORIDE: 600; 310; 30; 20 INJECTION, SOLUTION INTRAVENOUS at 10:05

## 2022-05-13 RX ADMIN — OXYCODONE 5 MG: 5 TABLET ORAL at 10:05

## 2022-05-13 RX ADMIN — CEFAZOLIN 2 G: 330 INJECTION, POWDER, FOR SOLUTION INTRAMUSCULAR; INTRAVENOUS at 07:05

## 2022-05-13 RX ADMIN — SODIUM CHLORIDE, SODIUM LACTATE, POTASSIUM CHLORIDE, AND CALCIUM CHLORIDE: 600; 310; 30; 20 INJECTION, SOLUTION INTRAVENOUS at 06:05

## 2022-05-13 RX ADMIN — ONDANSETRON 8 MG: 2 INJECTION, SOLUTION INTRAMUSCULAR; INTRAVENOUS at 10:05

## 2022-05-13 RX ADMIN — PHENYLEPHRINE HYDROCHLORIDE 100 MCG: 10 INJECTION INTRAVENOUS at 10:05

## 2022-05-13 RX ADMIN — ROCURONIUM BROMIDE 10 MG: 10 INJECTION, SOLUTION INTRAVENOUS at 07:05

## 2022-05-13 RX ADMIN — MIDAZOLAM HYDROCHLORIDE 2 MG: 1 INJECTION, SOLUTION INTRAMUSCULAR; INTRAVENOUS at 06:05

## 2022-05-13 RX ADMIN — FENTANYL CITRATE 100 MCG: 50 INJECTION, SOLUTION INTRAMUSCULAR; INTRAVENOUS at 07:05

## 2022-05-13 NOTE — ANESTHESIA POSTPROCEDURE EVALUATION
Anesthesia Post Evaluation    Patient: Francis Nagel    Procedure(s) Performed: Procedure(s) (LRB):  XI ROBOTIC HYSTERECTOMY (N/A)  STAGING (N/A)  XI ROBOTIC SALPINGECTOMY (Bilateral)  XI ROBOTIC OOPHORECTOMY (Right)  XI ROBOTIC OMENTECTOMY (N/A)  ROBOTIC APPENDECTOMY    Final Anesthesia Type: general      Patient location during evaluation: PACU  Patient participation: Yes- Able to Participate  Level of consciousness: awake and alert  Post-procedure vital signs: reviewed and stable  Pain management: adequate  Airway patency: patent    PONV status at discharge: No PONV  Anesthetic complications: no      Cardiovascular status: blood pressure returned to baseline and stable  Respiratory status: room air  Hydration status: euvolemic  Follow-up not needed.          Vitals Value Taken Time   /75 05/13/22 1250   Temp 36.7 °C (98.1 °F) 05/13/22 1150   Pulse 104 05/13/22 1250   Resp 16 05/13/22 1250   SpO2 98 % 05/13/22 1250         Event Time   Out of Recovery 11:45:47         Pain/Damián Score: Pain Rating Prior to Med Admin: 5 (5/13/2022 11:28 AM)  Pain Rating Post Med Admin: 5 (tolerable per patient) (5/13/2022 11:23 AM)  Damián Score: 10 (5/13/2022  1:00 PM)

## 2022-05-13 NOTE — DISCHARGE INSTRUCTIONS
Anesthesia: After Your Surgery  Youve just had surgery. During surgery, you received medication called anesthesia to keep you comfortable and pain-free. After surgery, you may experience some pain or nausea. This is common. Here are some tips for feeling better and recovering after surgery.    Going home  Your doctor or nurse will show you how to take care of yourself when you go home. He or she will also answer your questions. Have an adult family member or friend drive you home. For the first 24 hours after your surgery:  Do not drive or use heavy equipment.  Do not make important decisions or sign legal documents.  Avoid alcohol.  Have someone stay with you, if needed. He or she can watch for problems and help keep you safe.  Take your time getting up from a seated or lying position. You may experience dizziness for 24 hours  Be sure to keep all follow-up appointments with your doctor. And rest after your procedure for as long as your doctor tells you to.    Coping with pain  If you have pain after surgery, pain medication will help you feel better. Take it as directed, before pain becomes severe. Also, ask your doctor or pharmacist about other ways to control pain, such as with heat, ice, and relaxation. And follow any other instructions your surgeon or nurse gives you.    URINARY RETENTION  Should you experience a decrease in your urine output or are unable to urinate following surgery, this can be due to the medications given during surgery.  We recommend you going to the nearest Emergency Department.    Tips for taking pain medication  To get the best relief possible, remember these points:  Pain medications can upset your stomach. Taking them with a little food may help.  Most pain relievers taken by mouth need at least 20 to 30 minutes to take effect.  Taking medication on a schedule can help you remember to take it. Try to time your medication so that you can take it before beginning an activity, such  as dressing, walking, or sitting down for dinner.  Constipation is a common side effect of pain medications. Contact your doctor before taking any medications like laxatives or stool softeners to help relieve constipation. Also ask about any dietary restrictions, because drinking lots of fluids and eating foods like fruits and vegetables that are high in fiber can also help. Remember, dont take laxatives unless your surgeon has prescribed them.  Mixing alcohol and pain medication can cause dizziness and slow your breathing. It can even be fatal. Dont drink alcohol while taking pain medication.  Pain medication can slow your reflexes. Dont drive or operate machinery while taking pain medication.  If your health care provider tells you to take acetaminophen to help relieve your pain, ask him or her how much you are supposed to take each day. (Acetaminophen is the generic name for Tylenol and other brand-name pain relievers.) Acetaminophen or other pain relievers may interact with your prescription medicines or other over-the-counter (OTC) drugs. Some prescription medications contain acetaminophen along with other active ingredients. Using both prescription and OTC acetaminophen for pain can cause you to overdose. The FDA recommends that you read the labels on your OTC medications carefully. This will help you to clearly understand the list of active ingredients, dosing instructions, and any warnings. It may also help you avoid taking too much acetaminophen. If you have questions or don't understand the information, ask your pharmacist or health care provider to explain it to you before you take the OTC medication.    Managing nausea  Some people have an upset stomach after surgery. This is often due to anesthesia, pain, pain medications, or the stress of surgery. The following tips will help you manage nausea and get good nutrition as you recover. If you were on a special diet before surgery, ask your doctor if you  should follow it during recovery. These tips may help:  Dont push yourself to eat. Your body will tell you when to eat and how much.  Start off with clear liquids and soup. They are easier to digest.  Progress to semi-solid foods (mashed potatoes, applesauce, and gelatin) as you feel ready.  Slowly move to solid foods. Dont eat fatty, rich, or spicy foods at first.  Dont force yourself to have three large meals a day. Instead, eat smaller amounts more often.  Take pain medications with a small amount of solid food, such as crackers or toast to avoid nausea.      Call your surgeon if    You feel too sleepy, dizzy, or groggy (medication may be too strong).  You have side effects like nausea, vomiting, or skin changes (rash, itching, or hives).   © 9808-5006 [employer]. [address] [place] [address]. All rights reserved. This information is not intended as a substitute for professional medical care. Always follow your healthcare professional's instructions.                      Discharge Instructions for Laparoscopic Hysterectomy  You had a procedure called laparoscopic hysterectomy. A surgeon removed your uterus using instruments inserted through small incisions in your abdomen. These incisions may be tender or sore. You may also have pain in your upper back or shoulders. This is from the gas used to enlarge your abdomen to allow your doctor to see inside your pelvis and perform the procedure. This pain usually goes away in a day or two. It usually takes from 1-4 weeks to recover from laparoscopic hysterectomy. Remember, though, that recovery time varies from woman to woman. Here's what you can do to speed your recovery following surgery.    Home Care   Continue the coughing and deep breathing exercises that you learned in the hospital.  Take your medications exactly as directed by your doctor.  Avoid constipation.  Eat fruits, vegetables, and whole grains.  Drink 6-8 glasses of water a  day, unless told to do otherwise.  Use a laxative or a mild stool softener if your doctor says it's okay.    You may shower in 24 hours and get your incisions wet. Dab your incisions dry with a clean towel.  It is OK if the pieces of tape come off, (steri strips). If they are still in place 1 week after surgery, you should take them off.  You should call your doctor if the incisions become reddened or have foul smelling or bloody drainage. Avoid baths, swimming pools and hot tubs until seen by your physician for a post-op follow up.    Don't use oils, powders, or lotions on your incisions.  You should not have any sexual activity for six weeks.  This can cause severe bleeding. You should not insert anything into the vagina for six weeks, no tampons or douching.  If you had both ovaries removed, report hot flashes, mood swings, and irritability to your doctor. There may be medications that can help you.    Activity  Ask your doctor when you can start driving again. It's usually okay to drive as soon as you are free of pain and able to move comfortably from side to side. Don't drive while you are still taking narcotic pain medications.  Ask others to help with chores and errands while you recover.  Dont lift anything heavier than 10 pounds for 4 weeks.  Dont vacuum or do other strenuous activities until the doctor says it's okay.  Walk as often as you feel able.  Climb stairs slowly and pause after every few steps.    Follow-Up  Make a follow-up appointment as directed by our staff.     When to Call Your Doctor  Call your doctor right away if you have any of the following:  Fever above 100.4°F (38°C) or chills  Bright red vaginal bleeding or vaginal bleeding that soaks more than one sanitary pad per hour  A foul smelling discharge from the vagina  Trouble urinating or burning when you urinate  Severe pain or bloating in your abdomen  Redness, swelling, or drainage at your incision sites  Shortness of breath or chest  pain

## 2022-05-13 NOTE — PLAN OF CARE
Francis Nagel has met all discharge criteria from Phase II. Vital Signs are stable, ambulating  without difficulty. Discharge instructions given, patient verbalized understanding. Discharged from facility via wheelchair in stable condition.

## 2022-05-13 NOTE — H&P
The H&P has been reviewed and the patient has been examined. No changes since H&P was written.     Anesthesia/surgery risks and benefits were discussed with the patient and she voiced understanding.     To OR for RA-TLH/USO.    Corrine Lugo MD  OBGYN, PGY-4        HPI  S/p RA LSO 3/10/2022  Pathology:  THE LEFT OVARY SHOWS ADENOCARCINOMA, INTESTINAL PHENOTYPE MEASURING 15 CM,   RUPTURED.  FINAL CLASSIFICATION IS PENDING ADDITIONAL STAINS.   INITIAL IMMUNOHISTOCHEMICAL STAINS SHOW THAT THE TUMOR IS POSITIVE FOR CK7,   CK 20 AND CDX2.  TUMOR IS NEGATIVE FOR PAX 8 AND WT 1. THE CONTROLS STAIN   APPROPRIATELY.   NOTE:  Primary versus metastatic carcinoma based on morphology and   immunostain pattern. Will attempt an additional panel of stains to   subclassify. Intestinal phenotype tumors can arise in multiple locations,   most commonly the upper and lower gastrointestinal tract and occaisionally   cervix and pancreas. Correlation with endoscopy and imaging may be needed if   additional stains do not provide specificity.     Presents today for post operative visit and further treatment planning. Recovering appropriately from surgery. Up and about, eating, +BM.  Reports 9/2021 pap normal, she will obtain a copy for me.   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     EGD/Colonoscopy without obvious malignancy.  Cervical cytology negative.   PET   Impression:  No definite FDG avid tumor to suggest gastric malignancy.  Diffuse prominence of gastric uptake likely related to decompression/underdistension as well as an inflammatory component from reported gastritis.     Multifocal activity in the right adnexal region atypical appearance for physiologic ovarian uptake.  Differential considerations include physiologic ovarian activity and abnormal activity in adjacent bowel.  Recommend correlation with tumor markers and dedicated imaging  of the pelvis such as ultrasound or MRI for further evaluation if clinically indicated.     Indeterminate low-grade side of FDG avidity in the subserosal aspect of the left uterus, possibly small uterine fibroid versus misregistered focal pooling of excreted tracer within the ureter.  This could also be evaluated with pelvic imaging.  Attention on follow-up recommended.     Incidental thyroiditis.  Recommend clinical correlation.     Operative change of left salpingo-oophorectomy for left ovarian neoplasm.    Discussed completion hysterectomy/RSO/staging/any other indicated procedures for what appears to be a primary ovarian malignancy.      Referral history:  Self referred for a second opinion regarding pelvic mass and elevated .      She has previously seen Dr. Rupa Taylor and was referred to Dr. Taylor by Dr. Michelle Ramirez.      Pelvic US 2/4/2022  Uterus: The uterus measures 9 x 5.5 x 6.3 cm in dimension.  No uterine masses appreciated.  There is a normal homogeneous uterine parenchymal echotexture.  The endometrial stripe measures 5 mm, normal for a premenopausal patient.  No fluid/blood products within the endometrial canal.     Ovaries: The right ovary is markedly enlarged measuring 15.7 x 7.9 x 11.1 cm.  The left ovary is not visualized.  The right ovary appears nearly completely replaced by a large complex multi-septated cystic mass which shows areas with low level internal echoes.  No associated calcifications.  Benign and malignant ovarian neoplasms are possible.  Consider additional characterization of the right ovary with contrast enhanced MRI of the female pelvis.  There is normal arterial and venous color flow present in the right ovary.     CT A&P 2/15/2022  - there is a large complex pelvic mass which is centered above and above and left lateral to the uterus.  This measures 16.7 cm craniocaudal by 14 point 8 cm transversely by 12.8 cm AP.  This contains multiple cystic areas and demonstrates in  prominent enhancement and vascularity in other areas.  The uterus is anteverted and compressed.  Whether this arises from the right or left ovary is uncertain.  There is early bilateral hydronephrosis.  - of note there is no ascites, omental caking or peritoneal implants     DONNY 0.53 (low risk in a premenopausal patient)     Tumor markers:    Inhibin A 10  Inhibin B 46  hcg <2.4   42 (elevated)  AFP 2.7     No prior abdominal surgeries.  x 2. Does not desire future fertility.      Denies family history of breast, ovarian, uterine or colon cancer.      Endorses some occasional pelvic discomforts. Her  was available via speaker phone during our visit.    Review of Systems   Constitutional: Negative for appetite change, chills, fatigue and fever.   HENT: Negative for mouth sores.    Respiratory: Negative for cough and shortness of breath.    Cardiovascular: Negative for leg swelling.   Gastrointestinal: Negative for abdominal pain, blood in stool, constipation and diarrhea.   Endocrine: Negative for cold intolerance.   Genitourinary: Negative for dysuria and vaginal bleeding.   Musculoskeletal: Negative for myalgias.   Skin: Negative for rash.   Allergic/Immunologic: Negative.    Neurological: Negative for weakness and numbness.   Hematological: Negative for adenopathy. Does not bruise/bleed easily.   Psychiatric/Behavioral: Negative for confusion.         Objective:   Physical Exam:   Constitutional: She is oriented to person, place, and time. She appears well-developed and well-nourished.    HENT:   Head: Normocephalic and atraumatic.    Eyes: Pupils are equal, round, and reactive to light. EOM are normal.    Neck: No thyromegaly present.    Cardiovascular: Normal rate, regular rhythm and intact distal pulses.     Pulmonary/Chest: Effort normal and breath sounds normal. No respiratory distress. She has no wheezes.         Abdominal: Soft. Bowel sounds are normal. She exhibits abdominal  incision. She exhibits no distension and no mass. There is no abdominal tenderness.             Musculoskeletal: Normal range of motion and moves all extremeties.      Lymphadenopathy:     She has no cervical adenopathy.        Right: No supraclavicular adenopathy present.        Left: No supraclavicular adenopathy present.    Neurological: She is alert and oriented to person, place, and time.    Skin: Skin is warm and dry. No rash noted.    Psychiatric: She has a normal mood and affect.         Assessment:      1. Adenocarcinoma, intestinal type          Plan:          Orders Placed This Encounter   Procedures    Ambulatory referral/consult to Gastroenterology      Plan for RTLH/RSO/staging/any other indicated procedures based on intraoperative findings.     The risks, benefits, and indications of the procedure were discussed with the patient and her family members if present.  These included bleeding, transfusion, infection, damage to surrounding tissues (bowel, bladder, ureter), wound separation, lymphedema, conversion to laparotomy if laparoscopic, perioperative cardiac events, VTE, pneumonia, and possible death.  We discussed possible need for bowel or urologic resection, temporary or permanent ostomies. She voiced understanding, all questions were answered and consents were signed.

## 2022-05-13 NOTE — OP NOTE
DATE OF PROCEDURE:  05/13/2022     SURGEON:  Charmaine Delacruz M.D.     ASSISTANTS: IVONE [facility] Assist-- No qualified resident was available for the procedure. Corrine Lugo MD (RES) and Roberta Kaiser MD (RES)      PREOPERATIVE DIAGNOSIS:   1. Intestinal type ovarian cancer     POSTOPERATIVE DIAGNOSES:    1. Intestinal type ovarian cancer      PROCEDURE PERFORMED:  Robotic-assisted total laparoscopic hysterectomy,   Right salpingo-oophorectomy, bilateral pelvic and para-aortic lymph node dissection, omentectomy, peritoneal biopsies, indicated appendectomy      ANESTHESIA:  General endotracheal anesthesia.     SPECIMENS REMOVED:  1.  Uterus and cervix.  2.  Right fallopian tube and ovary.  3.  Bilateral pelvic lymph nodes.  4.  Bilateral para-aortic lymph nodes.  5.  Right and left peritoneum   6.  Left pelvic sidewall nodule  7.  Omentum  8.  Appendix  9.  Pelvic washings      ESTIMATED BLOOD LOSS:  <25 mL.     COMPLICATIONS:  None.     FINDINGS: Normal 8 week size uterus and cervix. Normal right fallopian tube and ovary. Evidence of prior LSO. No obvious enlarged lymph nodes. No obvious intraperitoneal disease. No ascites. No gross omental lesions. Normal diaphragms. Normal appendix. A few scattered hemosiderin deposits scattered along the peritoneal surfaces.        PROCEDURE IN DETAIL:  The patient was taken to the Operating Room.  Informed consent had been obtained.  She underwent general endotracheal anesthesia without difficulty, was prepped and draped in the normal sterile fashion in a dorsal lithotomy position.  Timeout was performed.  All parties agreed to the planned procedure.  Perioperative antibiotics were administered.  Valencia catheter was placed under sterile conditions.  The VCare uterine manipulator was secured in place in a standard fashion for uterine manipulation.  Gloves were changed and attention was turned to the patient's abdomen.       A direct cut down approach was performed in the  standard fashion in the left upper quadrant. Once the peritoneal cavity was entered abdomen was insufflated and pneumoperitoneum was obtained.  Additional trocars were placed, 2 robotic trocars to the left of the camera, 1 robotic trocar to the right of the camera and an additional 8 mm assist port to the right of the camera. These were all placed under direct visualization ensuring no injury to bowel or bladder. The patient was placed in steep Trendelenburg. Robot was docked and operating surgeon reported to the console.       Survey of the abdomen and pelvis revealed the above findings. Pelvic washings were obtained. Bilateral round ligaments were identified.  These were cauterized and transected.  The posterior leaf of the broad ligament was then opened bilaterally facilitating access to the retroperitoneum.  The right infundibulopelvic ligaments were then skeletonized with good visualization of the ureter beneath.  These were cauterized and transected.  The anterior leaf of the broad ligament was then opened circumferentially.  Proper plane for the bladder flap was identified and the bladder was gently reflected off of the anterior aspect of the uterus and cervix to the level of the cervicovaginal junction. Bilateral uterine arteries were then skeletonized.  These were cauterized and transected.  The remainder of the uterosacral and cardinal ligaments were then also serially cauterized and transected.  Posterior colpotomy was initiated in the 6 o'clock position and carried around circumferentially.  The uterus, cervix, right fallopian tube and ovary were then removed through the vagina.       We then proceeded with lymphadenectomy.  Pararectal and paravesical spaces were opened  bilaterally.  All fibrofatty tissue was removed within defined boundaries for the pelvic lymphadenectomy including laterally the psoas muscle and the genitofemoral nerve, medially the superior vesicle artery and the ureter, cephalad the  midpoint of the bifurcation of the common iliac arteries, caudad deep circumflex iliac vein and the deep boundary bilaterally the obturator   nerve.  We then proceeded with para-aortic lymph node dissection.  The peritoneum overlying the bifurcation of the aorta was opened.  We again removed fibrofatty tissue within defined boundaries which included laterally the psoas muscle, medially the aorta, cephalad the DARRYL and caudad the midpoint of the bifurcation of the common iliac arteries, the deep boundary on the patient's left were the vertebral bodies and on the right was the inferior vena cava. The lymph nodes were removed through the vagina.      We then proceeded with additional staging and indicated appendectomy given intestinal type tumor. Peritoneal biopsy were taken and a hemosiderin appearing deposit on the left pelvic sidewall was removed. The omentum was serially transected and cauterized along the curvature of the transverse colon in the standard fashion for omentectomy. We then cauterized and transected the mesoappendix to skeletonize the base of the appendix. Base of the appendix was then tied with chromic suture, vessel sealer was then used to seal and transect the appendix. Additional specimens removed through the vagina.       We then closed the vaginal cuff with a V-Loc running suture in a running fashion.The robotic trocars were then removed under direct visualization and the robot was undocked. Skin incisions were then rendered hemostatic. The fascia in the left upper quadrant entry was reapproximated with vicryl suture. Skin was closed with 4-0 Monocryl in a subcuticular fashion.  The patient tolerated the procedure well.  Sponge, lap, needle and instrument counts were correct x2 as reported by the circulating nurse.  She was awakened from anesthesia and taken to recovery in stable condition.

## 2022-05-13 NOTE — DISCHARGE SUMMARY
[facility]  Brief Op Note/Discharge Note  Short Stay    Admit Date: 5/13/2022    Discharge Date: 05/13/2022    Attending Physician: No att. providers found     Surgery Date: 5/13/2022     Surgeon(s) and Role:     * Charmaine Delacruz MD - Primary     * Corrine Lugo MD - Resident - Assisting    Pre-op Diagnosis:  Cyst of ovary, unspecified laterality [N83.209]  Pelvic mass [R19.00]  Elevated CA-125 [R97.1]    Post-op Diagnosis:  Post-Op Diagnosis Codes:     * Cyst of ovary, unspecified laterality [N83.209]     * Pelvic mass [R19.00]     * Elevated CA-125 [R97.1]    Procedure(s) (LRB):  XI ROBOTIC HYSTERECTOMY (N/A)  STAGING (N/A)  XI ROBOTIC SALPINGECTOMY (Bilateral)  XI ROBOTIC OOPHORECTOMY (Right)  XI ROBOTIC OMENTECTOMY (N/A)  ROBOTIC APPENDECTOMY    Anesthesia: General    Findings/Key Components: Normal 8 week size uterus and cervix. Normal right fallopian tube and ovary. Evidence of prior LSO. No obvious enlarged lymph nodes. No obvious intraperitoneal disease. No ascites. No gross omental lesions. Normal diaphragms. Normal appendix. A few scattered hemosiderin deposits scattered along the peritoneal surfaces.     Estimated Blood Loss: <25 cc         Specimens:   Specimen (24h ago, onward)             Start     Ordered    05/13/22 1001  Specimen to Pathology, Surgery Gynecology and Obstetrics  Once        Comments: Pre-op Diagnosis: Cyst of ovary, unspecified laterality [N83.209]Pelvic mass [R19.00]Elevated CA-125 [R97.1]Procedure(s):XI ROBOTIC HYSTERECTOMYSTAGINGXI ROBOTIC SALPINGECTOMYXI ROBOTIC OOPHORECTOMY Number of specimens: 10Name of specimens: 1. Uterus cervix bilateral tubes right ovary 2. Right karlie aortic lymph nodes. 3. Left karlie aortic lymph nodes. 4. Right pelvic peritoneum lymph nodes. 5. Left pelvic peritoneum lymph nodes. 6. Left pelvic lymph nodes. 7. Right pelvic lymph nodes 8. Left pelvi froy sidewall.  9 omentum. 10. appendix     References:    Click here for ordering Quick  Tip   Question Answer Comment   Procedure Type: Gynecology and Obstetrics    Specimen Class: Known or suspected malignancy    Which provider would you like to cc? EDVIN GILLESPIE    Release to patient Immediate        05/13/22 1015    05/13/22 0807  Cytology, Fluid/Wash/Brush  Once        Comments: Pelvic washings     Question Answer Comment   Source: Peritoneal/abdominal/pelvic wash    Clinical Information: cancer    Specific Site: pelvic    Other Requests: n/a    Release to patient Immediate        05/13/22 0807                Discharge Provider: Corrine Lugo    Diagnoses:  Active Hospital Problems    Diagnosis  POA    *S/P RA-TLH/USO/pelvic and para aortic LN/OMX/appendectomy/peritoneal biopsies [Z90.710]  Yes      Resolved Hospital Problems   No resolved problems to display.       Discharged Condition: good    Hospital Course:   Patient was admitted for outpatient procedure as above, and tolerated the procedure well with no complications. Please see operative report for further details. Following the procedure, the patient was awakened from anesthesia and transferred to the recovery area in stable condition. She was discharged to home once ambulating, voiding, tolerating PO intake, and pain was well-controlled. Patient was given routine post-op instructions and prescriptions for pain medication to take as needed. Patient instructed to follow up with Dr. Gillespie as scheduled.     Final Diagnoses: Same as principal problem.    Disposition: Home or Self Care    Follow up/Patient Instructions:    Medications:  Reconciled Home Medications:      Medication List      START taking these medications    HYDROcodone-acetaminophen 5-325 mg per tablet  Commonly known as: NORCO  Take 1 tablet by mouth every 6 (six) hours as needed for Pain.     ibuprofen 600 MG tablet  Commonly known as: ADVIL,MOTRIN  Take 1 tablet (600 mg total) by mouth 3 (three) times daily.        CONTINUE taking these medications    8 HOUR PAIN RELIEVER  650 MG Tbsr  Generic drug: acetaminophen  Take 1 tablet (650 mg total) by mouth every 6 to 8 hours as needed (Pain). Alternate every 3 hours with ibuprofen.     fexofenadine 180 MG tablet  Commonly known as: ALLEGRA  Take 180 mg by mouth once daily.          Discharge Procedure Orders   Pelvic Rest     Notify your health care provider if you experience any of the following:  temperature >100.4     Notify your health care provider if you experience any of the following:  persistent nausea and vomiting or diarrhea     Notify your health care provider if you experience any of the following:  severe uncontrolled pain     Notify your health care provider if you experience any of the following:  redness, tenderness, or signs of infection (pain, swelling, redness, odor or green/yellow discharge around incision site)     Notify your health care provider if you experience any of the following:  difficulty breathing or increased cough     Notify your health care provider if you experience any of the following:  severe persistent headache     Notify your health care provider if you experience any of the following:  persistent dizziness, light-headedness, or visual disturbances     Notify your health care provider if you experience any of the following:  increased confusion or weakness     Notify your health care provider if you experience any of the following:   Order Comments: Heavy vaginal bleeding >1 pad/hour for greater than 2 hours     Activity as tolerated      Follow-up Information     Charmaine Delacruz MD Follow up in 6 week(s).    Specialty: Gynecologic Oncology  Why: post op visit  Contact information:  [facility] [facility] [address]  [phone]                       Corrine Lugo MD  OBGYN, PGY-4

## 2022-05-13 NOTE — OPERATIVE NOTE ADDENDUM
Certification of Assistant at Surgery       Surgery Date: 5/13/2022     Participating Surgeons:  Surgeon(s) and Role:     * Charmaine Delacruz MD - Primary     * Corrine Lugo MD - Resident - Assisting    Procedures:  Procedure(s) (LRB):  XI ROBOTIC HYSTERECTOMY (N/A)  STAGING (N/A)  XI ROBOTIC SALPINGECTOMY (Bilateral)  XI ROBOTIC OOPHORECTOMY (Right)  XI ROBOTIC OMENTECTOMY (N/A)  ROBOTIC APPENDECTOMY    Assistant Surgeon's Certification of Necessity:  I understand that section 1842 (b) (6) (d) of the Social Security Act generally prohibits Medicare Part B reasonable charge payment for the services of assistants at surgery in teaching hospitals when qualified residents are available to furnish such services. I certify that the services for which payment is claimed were medically necessary, and that no qualified resident was available to perform the services. I further understand that these services are subject to post-payment review by the Medicare carrier.      IVONE Pathak    05/13/2022  1:47 PM

## 2022-05-13 NOTE — TRANSFER OF CARE
"Anesthesia Transfer of Care Note    Patient: Francis Nagel    Procedure(s) Performed: Procedure(s) (LRB):  XI ROBOTIC HYSTERECTOMY (N/A)  STAGING (N/A)  XI ROBOTIC SALPINGECTOMY (Bilateral)  XI ROBOTIC OOPHORECTOMY (Right)  XI ROBOTIC OMENTECTOMY (N/A)  ROBOTIC APPENDECTOMY    Patient location: PACU    Anesthesia Type: general    Transport from OR: Transported from OR on 6-10 L/min O2 by face mask with adequate spontaneous ventilation    Post pain: adequate analgesia    Post assessment: no apparent anesthetic complications    Post vital signs: stable    Level of consciousness: awake    Nausea/Vomiting: no nausea/vomiting    Complications: none    Transfer of care protocol was followed      Last vitals:   Visit Vitals  /74 (BP Location: Left arm, Patient Position: Lying)   Pulse 83   Temp 37 °C (98.6 °F) (Oral)   Resp 16   Ht 5' 1" (1.549 m)   Wt 48.5 kg (107 lb)   SpO2 98%   Breastfeeding No   BMI 20.22 kg/m²     "

## 2022-05-13 NOTE — OR NURSING
Vital signs stable on room air. Pain is tolerable per patient. Dressings and PIV clean dry and intact. Meets PACU discharge criteria, ready for transfer to phase II. Family and patient updated on plan of care.

## 2022-05-13 NOTE — ANESTHESIA PROCEDURE NOTES
Intubation    Date/Time: 5/13/2022 7:09 AM  Performed by: Janet Celaya CRNA  Authorized by: Le Echeverria MD     Intubation:     Induction:  Intravenous    Intubated:  Postinduction    Mask Ventilation:  Easy mask    Attempts:  1    Attempted By:  CRNA    Method of Intubation:  Video laryngoscopy    Blade:  Mays 3    Laryngeal View Grade: Grade I - full view of cords      Difficult Airway Encountered?: No      Complications:  None    Airway Device:  Oral endotracheal tube    Airway Device Size:  7.0    Style/Cuff Inflation:  Cuffed    Inflation Amount (mL):  4    Tube secured:  20    Secured at:  The lips    Placement Verified By:  Capnometry    Complicating Factors:  None    Findings Post-Intubation:  BS equal bilateral

## 2022-05-14 ENCOUNTER — PATIENT MESSAGE (OUTPATIENT)
Dept: GYNECOLOGIC ONCOLOGY | Facility: CLINIC | Age: 32
End: 2022-05-14
Payer: COMMERCIAL

## 2022-05-14 ENCOUNTER — HOSPITAL ENCOUNTER (EMERGENCY)
Facility: OTHER | Age: 32
Discharge: HOME | End: 2022-05-14
Attending: EMERGENCY MEDICINE
Payer: COMMERCIAL

## 2022-05-14 VITALS
RESPIRATION RATE: 16 BRPM | BODY MASS INDEX: 20.2 KG/M2 | SYSTOLIC BLOOD PRESSURE: 108 MMHG | TEMPERATURE: 98 F | WEIGHT: 107 LBS | DIASTOLIC BLOOD PRESSURE: 75 MMHG | OXYGEN SATURATION: 98 % | HEART RATE: 104 BPM | HEIGHT: 61 IN

## 2022-05-14 VITALS
WEIGHT: 107 LBS | BODY MASS INDEX: 20.2 KG/M2 | HEART RATE: 80 BPM | TEMPERATURE: 100 F | DIASTOLIC BLOOD PRESSURE: 61 MMHG | OXYGEN SATURATION: 96 % | RESPIRATION RATE: 17 BRPM | HEIGHT: 61 IN | SYSTOLIC BLOOD PRESSURE: 94 MMHG

## 2022-05-14 DIAGNOSIS — T79.7XXA: ICD-10-CM

## 2022-05-14 DIAGNOSIS — R55 SYNCOPE: Primary | ICD-10-CM

## 2022-05-14 DIAGNOSIS — M79.605 LEFT LEG PAIN: ICD-10-CM

## 2022-05-14 LAB
ALBUMIN SERPL BCP-MCNC: 3.3 G/DL (ref 3.5–5.2)
ALP SERPL-CCNC: 47 U/L (ref 55–135)
ALT SERPL W/O P-5'-P-CCNC: 10 U/L (ref 10–44)
ANION GAP SERPL CALC-SCNC: 11 MMOL/L (ref 8–16)
APTT BLDCRRT: 27.7 SEC (ref 21–32)
AST SERPL-CCNC: 9 U/L (ref 10–40)
BASOPHILS # BLD AUTO: 0.02 K/UL (ref 0–0.2)
BASOPHILS NFR BLD: 0.1 % (ref 0–1.9)
BILIRUB SERPL-MCNC: 0.8 MG/DL (ref 0.1–1)
BUN SERPL-MCNC: 6 MG/DL (ref 6–20)
CALCIUM SERPL-MCNC: 8.7 MG/DL (ref 8.7–10.5)
CHLORIDE SERPL-SCNC: 103 MMOL/L (ref 95–110)
CO2 SERPL-SCNC: 24 MMOL/L (ref 23–29)
CREAT SERPL-MCNC: 0.5 MG/DL (ref 0.5–1.4)
CTP QC/QA: YES
DIFFERENTIAL METHOD: ABNORMAL
EOSINOPHIL # BLD AUTO: 0 K/UL (ref 0–0.5)
EOSINOPHIL NFR BLD: 0.3 % (ref 0–8)
ERYTHROCYTE [DISTWIDTH] IN BLOOD BY AUTOMATED COUNT: 11.9 % (ref 11.5–14.5)
EST. GFR  (AFRICAN AMERICAN): >60 ML/MIN/1.73 M^2
EST. GFR  (NON AFRICAN AMERICAN): >60 ML/MIN/1.73 M^2
GLUCOSE SERPL-MCNC: 119 MG/DL (ref 70–110)
HCT VFR BLD AUTO: 35.3 % (ref 37–48.5)
HGB BLD-MCNC: 11.7 G/DL (ref 12–16)
IMM GRANULOCYTES # BLD AUTO: 0.05 K/UL (ref 0–0.04)
IMM GRANULOCYTES NFR BLD AUTO: 0.3 % (ref 0–0.5)
INR PPP: 1 (ref 0.8–1.2)
LYMPHOCYTES # BLD AUTO: 1 K/UL (ref 1–4.8)
LYMPHOCYTES NFR BLD: 6.8 % (ref 18–48)
MCH RBC QN AUTO: 32.2 PG (ref 27–31)
MCHC RBC AUTO-ENTMCNC: 33.1 G/DL (ref 32–36)
MCV RBC AUTO: 97 FL (ref 82–98)
MONOCYTES # BLD AUTO: 0.8 K/UL (ref 0.3–1)
MONOCYTES NFR BLD: 5.3 % (ref 4–15)
NEUTROPHILS # BLD AUTO: 13.1 K/UL (ref 1.8–7.7)
NEUTROPHILS NFR BLD: 87.2 % (ref 38–73)
NRBC BLD-RTO: 0 /100 WBC
PLATELET # BLD AUTO: 246 K/UL (ref 150–450)
PMV BLD AUTO: 8.7 FL (ref 9.2–12.9)
POTASSIUM SERPL-SCNC: 3.4 MMOL/L (ref 3.5–5.1)
PROT SERPL-MCNC: 7 G/DL (ref 6–8.4)
PROTHROMBIN TIME: 11 SEC (ref 9–12.5)
RBC # BLD AUTO: 3.63 M/UL (ref 4–5.4)
SARS-COV-2 RDRP RESP QL NAA+PROBE: NEGATIVE
SODIUM SERPL-SCNC: 138 MMOL/L (ref 136–145)
WBC # BLD AUTO: 15.06 K/UL (ref 3.9–12.7)

## 2022-05-14 PROCEDURE — 96374 THER/PROPH/DIAG INJ IV PUSH: CPT

## 2022-05-14 PROCEDURE — 99024 POSTOP FOLLOW-UP VISIT: CPT | Mod: ,,, | Performed by: OBSTETRICS & GYNECOLOGY

## 2022-05-14 PROCEDURE — 99285 EMERGENCY DEPT VISIT HI MDM: CPT | Mod: 25

## 2022-05-14 PROCEDURE — 80053 COMPREHEN METABOLIC PANEL: CPT | Performed by: EMERGENCY MEDICINE

## 2022-05-14 PROCEDURE — 85610 PROTHROMBIN TIME: CPT | Performed by: EMERGENCY MEDICINE

## 2022-05-14 PROCEDURE — 99024 PR POST-OP FOLLOW-UP VISIT: ICD-10-PCS | Mod: ,,, | Performed by: OBSTETRICS & GYNECOLOGY

## 2022-05-14 PROCEDURE — 93010 EKG 12-LEAD: ICD-10-PCS | Mod: ,,, | Performed by: INTERNAL MEDICINE

## 2022-05-14 PROCEDURE — 85025 COMPLETE CBC W/AUTO DIFF WBC: CPT | Performed by: EMERGENCY MEDICINE

## 2022-05-14 PROCEDURE — 93005 ELECTROCARDIOGRAM TRACING: CPT

## 2022-05-14 PROCEDURE — 63600175 PHARM REV CODE 636 W HCPCS: Performed by: EMERGENCY MEDICINE

## 2022-05-14 PROCEDURE — 96375 TX/PRO/DX INJ NEW DRUG ADDON: CPT

## 2022-05-14 PROCEDURE — 93010 ELECTROCARDIOGRAM REPORT: CPT | Mod: ,,, | Performed by: INTERNAL MEDICINE

## 2022-05-14 PROCEDURE — 85730 THROMBOPLASTIN TIME PARTIAL: CPT | Performed by: EMERGENCY MEDICINE

## 2022-05-14 PROCEDURE — U0002 COVID-19 LAB TEST NON-CDC: HCPCS | Performed by: EMERGENCY MEDICINE

## 2022-05-14 RX ORDER — HYDROMORPHONE HYDROCHLORIDE 1 MG/ML
0.5 INJECTION, SOLUTION INTRAMUSCULAR; INTRAVENOUS; SUBCUTANEOUS
Status: COMPLETED | OUTPATIENT
Start: 2022-05-14 | End: 2022-05-14

## 2022-05-14 RX ORDER — ONDANSETRON 2 MG/ML
4 INJECTION INTRAMUSCULAR; INTRAVENOUS
Status: COMPLETED | OUTPATIENT
Start: 2022-05-14 | End: 2022-05-14

## 2022-05-14 RX ORDER — ONDANSETRON 4 MG/1
4 TABLET, ORALLY DISINTEGRATING ORAL EVERY 6 HOURS PRN
Qty: 12 TABLET | Refills: 0 | Status: SHIPPED | OUTPATIENT
Start: 2022-05-14 | End: 2022-07-07

## 2022-05-14 RX ADMIN — HYDROMORPHONE HYDROCHLORIDE 0.5 MG: 1 INJECTION, SOLUTION INTRAMUSCULAR; INTRAVENOUS; SUBCUTANEOUS at 09:05

## 2022-05-14 RX ADMIN — ONDANSETRON 4 MG: 2 INJECTION INTRAMUSCULAR; INTRAVENOUS at 09:05

## 2022-05-15 NOTE — CONSULTS
Consult Note  Gynecology    Consult Requested By: Emergency Medicine  Reason for Consult: Postop swelling and syncopal episode     SUBJECTIVE:     History of Present Illness:  Francis Nagel is a 31 y.o. 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     Patient was discharged home yesterday on POD#0 without complication. She did well overnight and throughout the morning today. Then this afternoon she began to notice increased lower extremity swelling and then experienced syncopal episode while ambulating to the restroom. She did feel nauseous just prior to syncopal episode. She denies fever/chills. She denies any shortness of breath. She does report minimal intake since surgery. Denies vaginal bleeding. She is passing gas and urinating without issue.       Review of patient's allergies indicates:  No Known Allergies  Past Medical History:   Diagnosis Date    Anemia 03/11/2022    Encounter for blood transfusion 03/11/2022    Mass of left ovary 03/10/2022    JOSE MIGUEL.     LEFT OVARY SHOWS ADENOCARCINOMA, INTESTINAL PHENOTYPE MEASURING 15 CM    Metastatic cancer 4/11/2022    PONV (postoperative nausea and vomiting)     S/P unilateral salpingo-oophorectomy/mass resection 03/10/2022     Past Surgical History:   Procedure Laterality Date    COLONOSCOPY N/A 4/11/2022    Procedure: COLONOSCOPY;  Surgeon: Eric Meléndez Jr., MD;  Location: [facility];  Service: Endoscopy;  Laterality: N/A;    ESOPHAGOGASTRODUODENOSCOPY N/A 4/11/2022    Procedure: EGD (ESOPHAGOGASTRODUODENOSCOPY);  Surgeon: Eric Meléndez Jr., MD;  Location: [facility];  Service: Endoscopy;  Laterality: N/A;    ROBOT-ASSISTED LAPAROSCOPIC SALPINGO-OOPHORECTOMY USING DA DARVIN XI Left 3/10/2022    Procedure: XI ROBOTIC SALPINGO-OOPHORECTOMY/ USO;  Surgeon: Charmaine Delacruz MD;  Location:  [facility] OR 2ND FLR;  Service: OB/GYN;  Laterality: Left;     OB History        2    Para        Term                AB        Living   2       SAB        IAB        Ectopic        Multiple        Live Births   2               Family History   Problem Relation Age of Onset    Breast cancer Neg Hx     Colon cancer Neg Hx     Ovarian cancer Neg Hx      Social History     Socioeconomic History    Marital status:      Spouse name: Alonso    Number of children: 2   Occupational History     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     Current Outpatient Medications   Medication Sig    acetaminophen (TYLENOL) 650 MG TbSR Take 1 tablet (650 mg total) by mouth every 6 to 8 hours as needed (Pain). Alternate every 3 hours with ibuprofen.    fexofenadine (ALLEGRA) 180 MG tablet Take 180 mg by mouth once daily.    HYDROcodone-acetaminophen (NORCO) 5-325 mg per tablet Take 1 tablet by mouth every 6 (six) hours as needed for Pain.    ibuprofen (ADVIL,MOTRIN) 600 MG tablet Take 1 tablet (600 mg total) by mouth 3 (three) times daily.       Review of Systems:  Constitutional: no fever, chills; positive for syncope   Eyes:  No vision changes  Cardiovascular: No chest pain  Respiratory: No shortness of breath or cough  Gastrointestinal: No diarrhea, bloody stool, nausea/vomiting, constipation  Genitourinary: No blood in urine, painful urination, urgency of urination, frequency of urination  Skin/Breast: No painful breasts, nipple discharge, masses  Neurological: No headache  Endocrine: No hot flushes  Psychiatric: No depression or anxiety  MSK: positive for left lower extremity swelling     OBJECTIVE:     Vital  Signs  Temp:  [98.1 °F (36.7 °C)-99.6 °F (37.6 °C)] 99.6 °F (37.6 °C)  Pulse:  [82-96] 82  Resp:  [16-19] 19  SpO2:  [96 %-97 %] 96 %  BP: ()/(62-73) 100/64    Physical Exam:  Gen: A&Ox3, NAD  CV: RRR  Pulm: LCTAB  Abd: Soft, mildly distended;appropriate postoperative tenderness without rebound or guarding; robotic port incisions clean, dry and intact with bandages overlying   Ext: PPP, left lower extremity crepitus from groin to ankle; nontender; negative Malachi sign; no erythema or trauma noted       Laboratory  Recent Results (from the past 24 hour(s))   CBC auto differential    Collection Time: 05/14/22  8:40 PM   Result Value Ref Range    WBC 15.06 (H) 3.90 - 12.70 K/uL    RBC 3.63 (L) 4.00 - 5.40 M/uL    Hemoglobin 11.7 (L) 12.0 - 16.0 g/dL    Hematocrit 35.3 (L) 37.0 - 48.5 %    MCV 97 82 - 98 fL    MCH 32.2 (H) 27.0 - 31.0 pg    MCHC 33.1 32.0 - 36.0 g/dL    RDW 11.9 11.5 - 14.5 %    Platelets 246 150 - 450 K/uL    MPV 8.7 (L) 9.2 - 12.9 fL    Immature Granulocytes 0.3 0.0 - 0.5 %    Gran # (ANC) 13.1 (H) 1.8 - 7.7 K/uL    Immature Grans (Abs) 0.05 (H) 0.00 - 0.04 K/uL    Lymph # 1.0 1.0 - 4.8 K/uL    Mono # 0.8 0.3 - 1.0 K/uL    Eos # 0.0 0.0 - 0.5 K/uL    Baso # 0.02 0.00 - 0.20 K/uL    nRBC 0 0 /100 WBC    Gran % 87.2 (H) 38.0 - 73.0 %    Lymph % 6.8 (L) 18.0 - 48.0 %    Mono % 5.3 4.0 - 15.0 %    Eosinophil % 0.3 0.0 - 8.0 %    Basophil % 0.1 0.0 - 1.9 %    Differential Method Automated    Comprehensive metabolic panel    Collection Time: 05/14/22  8:40 PM   Result Value Ref Range    Sodium 138 136 - 145 mmol/L    Potassium 3.4 (L) 3.5 - 5.1 mmol/L    Chloride 103 95 - 110 mmol/L    CO2 24 23 - 29 mmol/L    Glucose 119 (H) 70 - 110 mg/dL    BUN 6 6 - 20 mg/dL    Creatinine 0.5 0.5 - 1.4 mg/dL    Calcium 8.7 8.7 - 10.5 mg/dL    Total Protein 7.0 6.0 - 8.4 g/dL    Albumin 3.3 (L) 3.5 - 5.2 g/dL    Total Bilirubin 0.8 0.1 - 1.0 mg/dL    Alkaline Phosphatase 47 (L) 55 - 135 U/L    AST 9 (L) 10 - 40 U/L     ALT 10 10 - 44 U/L    Anion Gap 11 8 - 16 mmol/L    eGFR if African American >60 >60 mL/min/1.73 m^2    eGFR if non African American >60 >60 mL/min/1.73 m^2   Protime-INR    Collection Time: 05/14/22  8:40 PM   Result Value Ref Range    Prothrombin Time 11.0 9.0 - 12.5 sec    INR 1.0 0.8 - 1.2   APTT    Collection Time: 05/14/22  8:40 PM   Result Value Ref Range    aPTT 27.7 21.0 - 32.0 sec   POCT COVID-19 Rapid Screening    Collection Time: 05/14/22  8:56 PM   Result Value Ref Range    POC Rapid COVID Negative Negative     Acceptable Yes        Diagnostic Results:  Imaging Results          US Lower Extremity Veins Left (Final result)  Result time 05/14/22 22:17:05    Final result by Kennedy Escalante MD (05/14/22 22:17:05)                 Impression:      No evidence of deep venous thrombosis in the left lower extremity.    Ill-defined region of echogenicity in the left groin and extending inferiorly.  This is of unclear etiology.  Suggest direct visualization and additional evaluation, as clinically warranted.      Electronically signed by: Kennedy Escalante MD  Date:    05/14/2022  Time:    22:17             Narrative:    EXAMINATION:  US LOWER EXTREMITY VEINS LEFT    CLINICAL HISTORY:  Pain in left leg    TECHNIQUE:  Duplex and color flow Doppler evaluation and graded compression of the left lower extremity veins was performed.    COMPARISON:  CT scan pelvis dated 02/15/2022.    FINDINGS:  Left thigh veins: The common femoral, femoral, popliteal, upper greater saphenous, and deep femoral veins are patent and free of thrombus. The veins are normally compressible and have normal phasic flow and augmentation response.    Left calf veins: The visualized calf veins are patent.    Contralateral CFV: The contralateral (right) common femoral vein is patent and free of thrombus.    Miscellaneous: There is ill-defined echogenicity new left groin extending inferiorly.                                   ASSESSMENT/PLAN:     There are no hospital problems to display for this patient.      Francis Nagel is a 31 y.o.  who presents with syncopal episode and lower extremity swelling     1. Lower extremity swelling   - exam and imaging negative for DVT   - VSS   - crepitus and swelling likely secondary to extravasation of insufflated CO2 during procedure   - this will resorb over time   - this was discussed with patient and all questions were answered     2. Syncopal episode   - vitals stable   - patient endorses minimal intake since procedure   - suspect vasovagal episode   - EKG wnl   - encouraged adequate hydration and PO intake       iLz Mitchell MD   OBGYN  PGY-4

## 2022-05-15 NOTE — ED PROVIDER NOTES
"Encounter Date: 5/14/2022    SCRIBE #1 NOTE: I, Mp Garrison, am scribing for, and in the presence of, Gabrielle Monet MD.       History     Chief Complaint   Patient presents with    Post-op Problem     Pt was d/c yesterday after total hysterectomy  reports one syncopal episode lasting 20 minutes today pt also c/o L leg swelling and warm to touch  placed legs up and swelling decreased pt now states leg "feels crunchy inside"  Pt denies vaginal bleeding.     Time seen by provider: 8:42 PM    This is a 31 y.o. female, with a PMHx of metastatic cancer, who presents to the ED with complaint of a post-op problem. Patient states she had a laparoscopic total hysterectomy yesterday. She reports feeling fine after her surgery yesterday, but noticed that the left leg was swollen this morning. She further complains of abdominal pain, which she rates a 8/10. Patient has taken Hydrocodone and Ibuprofen with no relief.  states the patient had a syncopal episode around 1 PM today. He mentions he was able to catch her, but she did lose consciousness for 20-30 seconds. Patient also notes experiencing nausea and 1 episode of vomiting at 5 PM today. She denies having a cough or rhinorrhea.  reports she felt "warm to the touch" earlier. Patient has not had any bowel movements since the hysterectomy yesterday. She states she has "not been eating or drinking a lot because of the nausea." Patient had an oophorectomy done 3 weeks ago on the left side "due to cancerous cells being present". She does not have a PMHx of HTN or asthma. This is the extent of the patient's complaints at this time.    The history is provided by the patient and the spouse.     Review of patient's allergies indicates:  No Known Allergies  Past Medical History:   Diagnosis Date    Anemia 03/11/2022    Encounter for blood transfusion 03/11/2022    Mass of left ovary 03/10/2022    JOSE MIGUEL.     LEFT OVARY SHOWS ADENOCARCINOMA, " INTESTINAL PHENOTYPE MEASURING 15 CM    Metastatic cancer 4/11/2022    PONV (postoperative nausea and vomiting)     S/P unilateral salpingo-oophorectomy/mass resection 03/10/2022     Past Surgical History:   Procedure Laterality Date    COLONOSCOPY N/A 4/11/2022    Procedure: COLONOSCOPY;  Surgeon: Eric Meléndez Jr., MD;  Location: [facility];  Service: Endoscopy;  Laterality: N/A;    ESOPHAGOGASTRODUODENOSCOPY N/A 4/11/2022    Procedure: EGD (ESOPHAGOGASTRODUODENOSCOPY);  Surgeon: Eric Meléndez Jr., MD;  Location: [facility] ENDO;  Service: Endoscopy;  Laterality: N/A;    ROBOT-ASSISTED LAPAROSCOPIC SALPINGO-OOPHORECTOMY USING DA DARVIN XI Left 3/10/2022    Procedure: XI ROBOTIC SALPINGO-OOPHORECTOMY/ USO;  Surgeon: Charmaine Delacruz MD;  Location: [address];  Service: OB/GYN;  Laterality: Left;     Family History   Problem Relation Age of Onset    Breast cancer Neg Hx     Colon cancer Neg Hx     Ovarian cancer Neg Hx      Social History     Tobacco Use    Smoking status: Never Smoker    Smokeless tobacco: Never Used   Substance Use Topics    Alcohol use: Yes     Comment: twice a month    Drug use: Never     Review of Systems   Constitutional: Positive for appetite change (decreased) and fever (subjective). Negative for chills.   HENT: Negative for congestion, rhinorrhea and sore throat.    Eyes: Negative for visual disturbance.   Respiratory: Negative for cough and shortness of breath.    Cardiovascular: Negative for chest pain and palpitations.   Gastrointestinal: Positive for abdominal pain, nausea and vomiting. Negative for diarrhea.   Genitourinary: Negative for decreased urine volume, dysuria and vaginal discharge.   Musculoskeletal: Positive for myalgias (leg). Negative for neck pain and neck stiffness.        Positive for left leg swelling.   Skin: Negative for rash and wound.   Neurological: Positive for syncope. Negative for weakness, numbness and headaches.   Psychiatric/Behavioral:  Negative for confusion.       Physical Exam     Initial Vitals [05/14/22 1948]   BP Pulse Resp Temp SpO2   98/65 96 18 98.1 °F (36.7 °C) 96 %      MAP       --         Physical Exam    Nursing note and vitals reviewed.  Constitutional: She appears well-developed and well-nourished. No distress.   HENT:   Head: Normocephalic and atraumatic.   Mouth/Throat: Oropharynx is clear and moist. No oropharyngeal exudate.   Eyes: Conjunctivae and EOM are normal. Pupils are equal, round, and reactive to light.   Neck: Neck supple.   Normal range of motion.  Cardiovascular: Normal rate, normal heart sounds and intact distal pulses.   No murmur heard.  Pulmonary/Chest: Breath sounds normal. No respiratory distress. She has no wheezes. She has no rhonchi. She has no rales.   Abdominal: Abdomen is soft. There is abdominal tenderness (Suprapubic and bilateral lower quadrants).   Crepitus in left lower abdominal wall. There is no rebound and no guarding.   Musculoskeletal:         General: Tenderness (Left leg) present.      Cervical back: Normal range of motion and neck supple.      Comments: Crepitus in left lower extremity.     Neurological: She is alert and oriented to person, place, and time. She has normal strength. GCS score is 15. GCS eye subscore is 4. GCS verbal subscore is 5. GCS motor subscore is 6.   Skin: Skin is warm and dry. No rash noted.   Psychiatric: She has a normal mood and affect. Thought content normal.         ED Course   Procedures  Labs Reviewed   CBC W/ AUTO DIFFERENTIAL - Abnormal; Notable for the following components:       Result Value    WBC 15.06 (*)     RBC 3.63 (*)     Hemoglobin 11.7 (*)     Hematocrit 35.3 (*)     MCH 32.2 (*)     MPV 8.7 (*)     Gran # (ANC) 13.1 (*)     Immature Grans (Abs) 0.05 (*)     Gran % 87.2 (*)     Lymph % 6.8 (*)     All other components within normal limits   COMPREHENSIVE METABOLIC PANEL - Abnormal; Notable for the following components:    Potassium 3.4 (*)      Glucose 119 (*)     Albumin 3.3 (*)     Alkaline Phosphatase 47 (*)     AST 9 (*)     All other components within normal limits   PROTIME-INR   APTT   SARS-COV-2 RDRP GENE     EKG Readings: (Independently Interpreted)   Normal sinus rhythm with a rate of 88 bpm. No STEMI. Non-specific T wave abnormalities present.        Imaging Results          US Lower Extremity Veins Left (Final result)  Result time 05/14/22 22:17:05    Final result by Kennedy Escalante MD (05/14/22 22:17:05)                 Impression:      No evidence of deep venous thrombosis in the left lower extremity.    Ill-defined region of echogenicity in the left groin and extending inferiorly.  This is of unclear etiology.  Suggest direct visualization and additional evaluation, as clinically warranted.      Electronically signed by: Kennedy Escalante MD  Date:    05/14/2022  Time:    22:17             Narrative:    EXAMINATION:  US LOWER EXTREMITY VEINS LEFT    CLINICAL HISTORY:  Pain in left leg    TECHNIQUE:  Duplex and color flow Doppler evaluation and graded compression of the left lower extremity veins was performed.    COMPARISON:  CT scan pelvis dated 02/15/2022.    FINDINGS:  Left thigh veins: The common femoral, femoral, popliteal, upper greater saphenous, and deep femoral veins are patent and free of thrombus. The veins are normally compressible and have normal phasic flow and augmentation response.    Left calf veins: The visualized calf veins are patent.    Contralateral CFV: The contralateral (right) common femoral vein is patent and free of thrombus.    Miscellaneous: There is ill-defined echogenicity new left groin extending inferiorly.                                 Medications   ondansetron injection 4 mg (4 mg Intravenous Given 5/14/22 2112)   HYDROmorphone injection 0.5 mg (0.5 mg Intravenous Given 5/14/22 2113)     Medical Decision Making:   History:   Old Medical Records: I decided to obtain old medical records.  Independently Interpreted  Test(s):   I have ordered and independently interpreted EKG Reading(s) - see prior notes  Clinical Tests:   Lab Tests: Reviewed and Ordered  Radiological Study: Ordered and Reviewed  Medical Tests: Ordered and Reviewed  ED Management:  Emergent evaluation of 31-year-old female who presents with complaint of left leg pain and swelling, abdominal pain, syncopal episode, postop day 1 from laparoscopic hysterectomy.  Vital signs are benign, afebrile.  On exam there is subcutaneous crepitus of the skin of the abdominal wall and entire left lower extremity.  Left calf is tender.  Left lower abdomen is tender but nonsurgical.  Given patient's recent laparoscopy, I suspect air may be related to insufflation, but does seem quite excessive on exam.  I discussed the case with gynecology/oncology resident who did come to see the patient.  Labs show leukocytosis, stable H&H, no evidence of DVT.  EKG showed no significant dysrhythmia, and patient had no dysrhythmia throughout ED monitoring on telemetry.  I suspect syncope was related to recent surgery, narcotic analgesics use, and baseline low blood pressure.  Ultimately patient was discharged in good condition after evaluation by Gynecology/Oncology in discussion with their staff.  She is encouraged to keep scheduled close follow-up and given strict return precautions.          Scribe Attestation:   Scribe #1: I performed the above scribed service and the documentation accurately describes the services I performed. I attest to the accuracy of the note.        ED Course as of 05/15/22 0048   Sat May 14, 2022   2045 Discussed case with GYN/ONC [AK]   2250 Paged GYN/ONC [AK]      ED Course User Index  [AK] Gabrielle Monet MD          Physician Attestation for Scribe: I, Gabrielle Monet, reviewed documentation as scribed in my presence, which is both accurate and complete.    Clinical Impression:   Final diagnoses:  [R55] Syncope (Primary)  [M79.605] Left leg pain  [T79.7XXA]  Subcutaneous air, initial encounter          ED Disposition Condition    Discharge Stable        ED Prescriptions     Medication Sig Dispense Start Date End Date Auth. Provider    ondansetron (ZOFRAN-ODT) 4 MG TbDL Take 1 tablet (4 mg total) by mouth every 6 (six) hours as needed (nausea). 12 tablet 5/14/2022  Gabrielle Monet MD        Follow-up Information     Follow up With Specialties Details Why Contact Info    Charmaine Delacruz MD Gynecologic Oncology Schedule an appointment as soon as possible for a visit  for close follow up and symptom re-check [address] [facility] [address]  [phone]      [facility] Emergency Dept Emergency Medicine  As needed, If symptoms worsen [id number] [facility] [id number] [phone]           Gabrielle Monet MD  05/15/22 0048

## 2022-05-15 NOTE — ED TRIAGE NOTES
"Pt was d/c yesterday after total hysterectomy  reports one syncopal episode lasting 20 minutes today pt also c/o L leg swelling and warm to touch  placed legs up and swelling decreased pt now states leg "feels crunchy inside"  Pt denies vaginal bleeding. AAOx4.  "

## 2022-05-16 NOTE — PROGRESS NOTES
Subjective:      Patient ID: Francis Nagel is a 31 y.o. female.    Chief Complaint: Follow-up      HPI  S/p RA LSO 3/10/2022  Pathology:  THE LEFT OVARY SHOWS ADENOCARCINOMA, INTESTINAL PHENOTYPE MEASURING 15 CM,   RUPTURED.  FINAL CLASSIFICATION IS PENDING ADDITIONAL STAINS.   INITIAL IMMUNOHISTOCHEMICAL STAINS SHOW THAT THE TUMOR IS POSITIVE FOR CK7,   CK 20 AND CDX2.  TUMOR IS NEGATIVE FOR PAX 8 AND WT 1. THE CONTROLS STAIN   APPROPRIATELY.   NOTE:  Primary versus metastatic carcinoma based on morphology and   immunostain pattern. Will attempt an additional panel of stains to   subclassify. Intestinal phenotype tumors can arise in multiple locations,   most commonly the upper and lower gastrointestinal tract and occaisionally   cervix and pancreas. Correlation with endoscopy and imaging may be needed if   additional stains do not provide specificity.     Presents today for post operative visit and further treatment planning. Recovering appropriately from surgery. Up and about, eating, +BM.  Reports 9/2021 pap normal, she will obtain a copy for me.   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      EGD/Colonoscopy without obvious malignancy.  Cervical cytology negative.   PET   Impression:  No definite FDG avid tumor to suggest gastric malignancy.  Diffuse prominence of gastric uptake likely related to decompression/underdistension as well as an inflammatory component from reported gastritis.     Multifocal activity in the right adnexal region atypical appearance for physiologic ovarian uptake.  Differential considerations include physiologic ovarian activity and abnormal activity in adjacent bowel.  Recommend correlation with tumor markers and dedicated imaging of the pelvis such as ultrasound or MRI for further evaluation if clinically indicated.     Indeterminate low-grade side of FDG avidity in the subserosal aspect of  the left uterus, possibly small uterine fibroid versus misregistered focal pooling of excreted tracer within the ureter.  This could also be evaluated with pelvic imaging.  Attention on follow-up recommended.     Incidental thyroiditis.  Recommend clinical correlation.     Operative change of left salpingo-oophorectomy for left ovarian neoplasm.     Discussed completion hysterectomy/RSO/staging/any other indicated procedures for what appears to be a primary ovarian malignancy.      Referral history:  Self referred for a second opinion regarding pelvic mass and elevated .      She has previously seen Dr. Rupa Taylor and was referred to Dr. Taylor by Dr. Michelle Ramirez.      Pelvic US 2/4/2022  Uterus: The uterus measures 9 x 5.5 x 6.3 cm in dimension.  No uterine masses appreciated.  There is a normal homogeneous uterine parenchymal echotexture.  The endometrial stripe measures 5 mm, normal for a premenopausal patient.  No fluid/blood products within the endometrial canal.     Ovaries: The right ovary is markedly enlarged measuring 15.7 x 7.9 x 11.1 cm.  The left ovary is not visualized.  The right ovary appears nearly completely replaced by a large complex multi-septated cystic mass which shows areas with low level internal echoes.  No associated calcifications.  Benign and malignant ovarian neoplasms are possible.  Consider additional characterization of the right ovary with contrast enhanced MRI of the female pelvis.  There is normal arterial and venous color flow present in the right ovary.     CT A&P 2/15/2022  - there is a large complex pelvic mass which is centered above and above and left lateral to the uterus.  This measures 16.7 cm craniocaudal by 14 point 8 cm transversely by 12.8 cm AP.  This contains multiple cystic areas and demonstrates in prominent enhancement and vascularity in other areas.  The uterus is anteverted and compressed.  Whether this arises from the right or left ovary is uncertain.   There is early bilateral hydronephrosis.  - of note there is no ascites, omental caking or peritoneal implants     DONNY 0.53 (low risk in a premenopausal patient)     Tumor markers:    Inhibin A 10  Inhibin B 46  hcg <2.4   42 (elevated)  AFP 2.7     No prior abdominal surgeries.  x 2. Does not desire future fertility.      Denies family history of breast, ovarian, uterine or colon cancer.      Endorses some occasional pelvic discomforts. Her  was available via speaker phone during our visit.    Review of Systems   Constitutional: Negative for appetite change, chills, fatigue and fever.   HENT: Negative for mouth sores.    Respiratory: Negative for cough and shortness of breath.    Cardiovascular: Negative for leg swelling.   Gastrointestinal: Negative for abdominal pain, blood in stool, constipation and diarrhea.   Endocrine: Negative for cold intolerance.   Genitourinary: Negative for dysuria and vaginal bleeding.   Musculoskeletal: Negative for myalgias.   Skin: Negative for rash.   Allergic/Immunologic: Negative.    Neurological: Negative for weakness and numbness.   Hematological: Negative for adenopathy. Does not bruise/bleed easily.   Psychiatric/Behavioral: Negative for confusion.       Objective:   Physical Exam:   Constitutional: She is oriented to person, place, and time. She appears well-developed and well-nourished.    HENT:   Head: Normocephalic and atraumatic.    Eyes: Pupils are equal, round, and reactive to light. EOM are normal.    Neck: No thyromegaly present.    Cardiovascular: Normal rate, regular rhythm and intact distal pulses.     Pulmonary/Chest: Effort normal and breath sounds normal. No respiratory distress. She has no wheezes.        Abdominal: Soft. Bowel sounds are normal. She exhibits no distension and no mass. There is no abdominal tenderness.             Musculoskeletal: Normal range of motion and moves all extremeties.      Lymphadenopathy:     She has no  cervical adenopathy.        Right: No supraclavicular adenopathy present.        Left: No supraclavicular adenopathy present.    Neurological: She is alert and oriented to person, place, and time.    Skin: Skin is warm and dry. No rash noted.    Psychiatric: She has a normal mood and affect.       Assessment:     1. Adenocarcinoma, intestinal type    2. Malignant neoplasm of ovary, unspecified laterality        Plan:   No orders of the defined types were placed in this encounter.    Plan for RTLH/RSO/staging/any other indicated procedures based on intraoperative findings.      The risks, benefits, and indications of the procedure were discussed with the patient and her family members if present.  These included bleeding, transfusion, infection, damage to surrounding tissues (bowel, bladder, ureter), wound separation, lymphedema, conversion to laparotomy if laparoscopic, perioperative cardiac events, VTE, pneumonia, and possible death.  We discussed possible need for bowel or urologic resection, temporary or permanent ostomies. She voiced understanding, all questions were answered and consents were signed.    I spent approximately 30 minutes reviewing the available records and evaluating the patient, out of which over 50% of the time was spent face to face with the patient in counseling and coordinating this patient's care.

## 2022-05-18 LAB
FINAL PATHOLOGIC DIAGNOSIS: NORMAL
Lab: NORMAL

## 2022-05-20 ENCOUNTER — HOSPITAL ENCOUNTER (INPATIENT)
Facility: HOSPITAL | Age: 32
LOS: 4 days | Discharge: HOME | DRG: 862 | End: 2022-05-24
Attending: EMERGENCY MEDICINE | Admitting: OBSTETRICS & GYNECOLOGY
Payer: COMMERCIAL

## 2022-05-20 DIAGNOSIS — M79.606 LEG PAIN: ICD-10-CM

## 2022-05-20 DIAGNOSIS — R50.9 ACUTE FEBRILE ILLNESS: ICD-10-CM

## 2022-05-20 DIAGNOSIS — T81.43XA INFECTION OF ORGAN OR ORGAN SPACE AFTER SURGERY, INITIAL ENCOUNTER: Primary | ICD-10-CM

## 2022-05-20 DIAGNOSIS — R00.0 TACHYCARDIA: ICD-10-CM

## 2022-05-20 DIAGNOSIS — R18.8 PELVIC FLUID COLLECTION: ICD-10-CM

## 2022-05-20 DIAGNOSIS — T81.49XA POST-OPERATIVE WOUND ABSCESS: ICD-10-CM

## 2022-05-20 DIAGNOSIS — D72.829 LEUKOCYTOSIS, UNSPECIFIED TYPE: ICD-10-CM

## 2022-05-20 LAB
ALBUMIN SERPL BCP-MCNC: 3.1 G/DL (ref 3.5–5.2)
ALP SERPL-CCNC: 92 U/L (ref 55–135)
ALT SERPL W/O P-5'-P-CCNC: 42 U/L (ref 10–44)
ANION GAP SERPL CALC-SCNC: 11 MMOL/L (ref 8–16)
AST SERPL-CCNC: 47 U/L (ref 10–40)
BACTERIA #/AREA URNS AUTO: NORMAL /HPF
BASOPHILS # BLD AUTO: 0.02 K/UL (ref 0–0.2)
BASOPHILS NFR BLD: 0.1 % (ref 0–1.9)
BILIRUB SERPL-MCNC: 0.8 MG/DL (ref 0.1–1)
BILIRUB UR QL STRIP: NEGATIVE
BUN SERPL-MCNC: 5 MG/DL (ref 6–20)
CALCIUM SERPL-MCNC: 9.4 MG/DL (ref 8.7–10.5)
CHLORIDE SERPL-SCNC: 98 MMOL/L (ref 95–110)
CLARITY UR REFRACT.AUTO: CLEAR
CO2 SERPL-SCNC: 24 MMOL/L (ref 23–29)
COLOR UR AUTO: YELLOW
CREAT SERPL-MCNC: 0.6 MG/DL (ref 0.5–1.4)
CRP SERPL-MCNC: 276.1 MG/L (ref 0–8.2)
DIFFERENTIAL METHOD: ABNORMAL
EOSINOPHIL # BLD AUTO: 0.1 K/UL (ref 0–0.5)
EOSINOPHIL NFR BLD: 0.5 % (ref 0–8)
ERYTHROCYTE [DISTWIDTH] IN BLOOD BY AUTOMATED COUNT: 11.7 % (ref 11.5–14.5)
EST. GFR  (AFRICAN AMERICAN): >60 ML/MIN/1.73 M^2
EST. GFR  (NON AFRICAN AMERICAN): >60 ML/MIN/1.73 M^2
GLUCOSE SERPL-MCNC: 118 MG/DL (ref 70–110)
GLUCOSE UR QL STRIP: NEGATIVE
HCT VFR BLD AUTO: 33.4 % (ref 37–48.5)
HGB BLD-MCNC: 11.3 G/DL (ref 12–16)
HGB UR QL STRIP: ABNORMAL
IMM GRANULOCYTES # BLD AUTO: 0.19 K/UL (ref 0–0.04)
IMM GRANULOCYTES NFR BLD AUTO: 1 % (ref 0–0.5)
KETONES UR QL STRIP: ABNORMAL
LACTATE SERPL-SCNC: 0.8 MMOL/L (ref 0.5–2.2)
LEUKOCYTE ESTERASE UR QL STRIP: NEGATIVE
LYMPHOCYTES # BLD AUTO: 1.5 K/UL (ref 1–4.8)
LYMPHOCYTES NFR BLD: 8 % (ref 18–48)
MCH RBC QN AUTO: 31.7 PG (ref 27–31)
MCHC RBC AUTO-ENTMCNC: 33.8 G/DL (ref 32–36)
MCV RBC AUTO: 94 FL (ref 82–98)
MICROSCOPIC COMMENT: NORMAL
MONOCYTES # BLD AUTO: 1.6 K/UL (ref 0.3–1)
MONOCYTES NFR BLD: 8.4 % (ref 4–15)
NEUTROPHILS # BLD AUTO: 15.2 K/UL (ref 1.8–7.7)
NEUTROPHILS NFR BLD: 82 % (ref 38–73)
NITRITE UR QL STRIP: NEGATIVE
NRBC BLD-RTO: 0 /100 WBC
PH UR STRIP: 6 [PH] (ref 5–8)
PLATELET # BLD AUTO: 329 K/UL (ref 150–450)
PMV BLD AUTO: 8.6 FL (ref 9.2–12.9)
POTASSIUM SERPL-SCNC: 3.8 MMOL/L (ref 3.5–5.1)
PROT SERPL-MCNC: 8.6 G/DL (ref 6–8.4)
PROT UR QL STRIP: NEGATIVE
RBC # BLD AUTO: 3.56 M/UL (ref 4–5.4)
RBC #/AREA URNS AUTO: 1 /HPF (ref 0–4)
SODIUM SERPL-SCNC: 133 MMOL/L (ref 136–145)
SP GR UR STRIP: 1.01 (ref 1–1.03)
SQUAMOUS #/AREA URNS AUTO: 4 /HPF
URN SPEC COLLECT METH UR: ABNORMAL
WBC # BLD AUTO: 18.55 K/UL (ref 3.9–12.7)
WBC #/AREA URNS AUTO: 3 /HPF (ref 0–5)

## 2022-05-20 PROCEDURE — 96365 THER/PROPH/DIAG IV INF INIT: CPT

## 2022-05-20 PROCEDURE — 63600175 PHARM REV CODE 636 W HCPCS: Performed by: EMERGENCY MEDICINE

## 2022-05-20 PROCEDURE — 81001 URINALYSIS AUTO W/SCOPE: CPT | Performed by: EMERGENCY MEDICINE

## 2022-05-20 PROCEDURE — 93005 ELECTROCARDIOGRAM TRACING: CPT

## 2022-05-20 PROCEDURE — 86140 C-REACTIVE PROTEIN: CPT | Performed by: EMERGENCY MEDICINE

## 2022-05-20 PROCEDURE — 12000002 HC ACUTE/MED SURGE SEMI-PRIVATE ROOM

## 2022-05-20 PROCEDURE — 96366 THER/PROPH/DIAG IV INF ADDON: CPT

## 2022-05-20 PROCEDURE — 80053 COMPREHEN METABOLIC PANEL: CPT | Performed by: EMERGENCY MEDICINE

## 2022-05-20 PROCEDURE — 96368 THER/DIAG CONCURRENT INF: CPT

## 2022-05-20 PROCEDURE — 94761 N-INVAS EAR/PLS OXIMETRY MLT: CPT

## 2022-05-20 PROCEDURE — 99291 CRITICAL CARE FIRST HOUR: CPT | Mod: CS,,, | Performed by: EMERGENCY MEDICINE

## 2022-05-20 PROCEDURE — 93010 EKG 12-LEAD: ICD-10-PCS | Mod: ,,, | Performed by: INTERNAL MEDICINE

## 2022-05-20 PROCEDURE — 25000003 PHARM REV CODE 250

## 2022-05-20 PROCEDURE — 99291 CRITICAL CARE FIRST HOUR: CPT | Mod: 25

## 2022-05-20 PROCEDURE — 85025 COMPLETE CBC W/AUTO DIFF WBC: CPT | Performed by: EMERGENCY MEDICINE

## 2022-05-20 PROCEDURE — 63600175 PHARM REV CODE 636 W HCPCS

## 2022-05-20 PROCEDURE — 99291 PR CRITICAL CARE, E/M 30-74 MINUTES: ICD-10-PCS | Mod: CS,,, | Performed by: EMERGENCY MEDICINE

## 2022-05-20 PROCEDURE — 93010 ELECTROCARDIOGRAM REPORT: CPT | Mod: ,,, | Performed by: INTERNAL MEDICINE

## 2022-05-20 PROCEDURE — U0002 COVID-19 LAB TEST NON-CDC: HCPCS | Performed by: STUDENT IN AN ORGANIZED HEALTH CARE EDUCATION/TRAINING PROGRAM

## 2022-05-20 PROCEDURE — 83605 ASSAY OF LACTIC ACID: CPT | Performed by: EMERGENCY MEDICINE

## 2022-05-20 PROCEDURE — 96361 HYDRATE IV INFUSION ADD-ON: CPT

## 2022-05-20 PROCEDURE — 87040 BLOOD CULTURE FOR BACTERIA: CPT | Performed by: EMERGENCY MEDICINE

## 2022-05-20 PROCEDURE — 87076 CULTURE ANAEROBE IDENT EACH: CPT | Mod: 59 | Performed by: EMERGENCY MEDICINE

## 2022-05-20 RX ORDER — PROCHLORPERAZINE EDISYLATE 5 MG/ML
5 INJECTION INTRAMUSCULAR; INTRAVENOUS EVERY 6 HOURS PRN
Status: DISCONTINUED | OUTPATIENT
Start: 2022-05-21 | End: 2022-05-24 | Stop reason: HOSPADM

## 2022-05-20 RX ORDER — ONDANSETRON 8 MG/1
8 TABLET, ORALLY DISINTEGRATING ORAL EVERY 8 HOURS PRN
Status: DISCONTINUED | OUTPATIENT
Start: 2022-05-21 | End: 2022-05-24 | Stop reason: HOSPADM

## 2022-05-20 RX ORDER — OXYCODONE HYDROCHLORIDE 10 MG/1
10 TABLET ORAL EVERY 4 HOURS PRN
Status: DISCONTINUED | OUTPATIENT
Start: 2022-05-21 | End: 2022-05-24 | Stop reason: HOSPADM

## 2022-05-20 RX ORDER — TRIPROLIDINE/PSEUDOEPHEDRINE 2.5MG-60MG
200 TABLET ORAL
Status: COMPLETED | OUTPATIENT
Start: 2022-05-20 | End: 2022-05-20

## 2022-05-20 RX ORDER — HYDROMORPHONE HYDROCHLORIDE 1 MG/ML
1 INJECTION, SOLUTION INTRAMUSCULAR; INTRAVENOUS; SUBCUTANEOUS EVERY 6 HOURS PRN
Status: CANCELLED | OUTPATIENT
Start: 2022-05-21

## 2022-05-20 RX ORDER — OXYCODONE HYDROCHLORIDE 5 MG/1
5 TABLET ORAL EVERY 4 HOURS PRN
Status: DISCONTINUED | OUTPATIENT
Start: 2022-05-21 | End: 2022-05-24 | Stop reason: HOSPADM

## 2022-05-20 RX ORDER — VANCOMYCIN HCL IN 5 % DEXTROSE 1G/250ML
1000 PLASTIC BAG, INJECTION (ML) INTRAVENOUS
Status: COMPLETED | OUTPATIENT
Start: 2022-05-20 | End: 2022-05-20

## 2022-05-20 RX ORDER — SODIUM CHLORIDE 0.9 % (FLUSH) 0.9 %
10 SYRINGE (ML) INJECTION
Status: DISCONTINUED | OUTPATIENT
Start: 2022-05-21 | End: 2022-05-24 | Stop reason: HOSPADM

## 2022-05-20 RX ORDER — SODIUM CHLORIDE 9 MG/ML
INJECTION, SOLUTION INTRAVENOUS CONTINUOUS
Status: DISCONTINUED | OUTPATIENT
Start: 2022-05-21 | End: 2022-05-22

## 2022-05-20 RX ORDER — IBUPROFEN 600 MG/1
600 TABLET ORAL EVERY 6 HOURS PRN
Status: DISCONTINUED | OUTPATIENT
Start: 2022-05-21 | End: 2022-05-24 | Stop reason: HOSPADM

## 2022-05-20 RX ADMIN — SODIUM CHLORIDE, SODIUM LACTATE, POTASSIUM CHLORIDE, AND CALCIUM CHLORIDE 1455 ML: .6; .31; .03; .02 INJECTION, SOLUTION INTRAVENOUS at 07:05

## 2022-05-20 RX ADMIN — VANCOMYCIN HYDROCHLORIDE 1000 MG: 1 INJECTION, POWDER, LYOPHILIZED, FOR SOLUTION INTRAVENOUS at 08:05

## 2022-05-20 RX ADMIN — PIPERACILLIN AND TAZOBACTAM 4.5 G: 4; .5 INJECTION, POWDER, LYOPHILIZED, FOR SOLUTION INTRAVENOUS; PARENTERAL at 08:05

## 2022-05-20 RX ADMIN — IBUPROFEN 200 MG: 100 SUSPENSION ORAL at 10:05

## 2022-05-21 PROBLEM — T81.40XA POST OP INFECTION: Status: ACTIVE | Noted: 2022-05-21

## 2022-05-21 LAB
ANION GAP SERPL CALC-SCNC: 9 MMOL/L (ref 8–16)
BASOPHILS # BLD AUTO: 0.02 K/UL (ref 0–0.2)
BASOPHILS NFR BLD: 0.1 % (ref 0–1.9)
BUN SERPL-MCNC: 4 MG/DL (ref 6–20)
CALCIUM SERPL-MCNC: 8.2 MG/DL (ref 8.7–10.5)
CHLORIDE SERPL-SCNC: 104 MMOL/L (ref 95–110)
CO2 SERPL-SCNC: 24 MMOL/L (ref 23–29)
CREAT SERPL-MCNC: 0.5 MG/DL (ref 0.5–1.4)
CTP QC/QA: YES
DIFFERENTIAL METHOD: ABNORMAL
EOSINOPHIL # BLD AUTO: 0.1 K/UL (ref 0–0.5)
EOSINOPHIL NFR BLD: 0.6 % (ref 0–8)
ERYTHROCYTE [DISTWIDTH] IN BLOOD BY AUTOMATED COUNT: 11.9 % (ref 11.5–14.5)
EST. GFR  (AFRICAN AMERICAN): >60 ML/MIN/1.73 M^2
EST. GFR  (NON AFRICAN AMERICAN): >60 ML/MIN/1.73 M^2
GLUCOSE SERPL-MCNC: 114 MG/DL (ref 70–110)
GRAM STN SPEC: NORMAL
GRAM STN SPEC: NORMAL
HCT VFR BLD AUTO: 25.4 % (ref 37–48.5)
HGB BLD-MCNC: 8.4 G/DL (ref 12–16)
IMM GRANULOCYTES # BLD AUTO: 0.12 K/UL (ref 0–0.04)
IMM GRANULOCYTES NFR BLD AUTO: 0.7 % (ref 0–0.5)
LACTATE SERPL-SCNC: 0.7 MMOL/L (ref 0.5–2.2)
LYMPHOCYTES # BLD AUTO: 1.4 K/UL (ref 1–4.8)
LYMPHOCYTES NFR BLD: 8.9 % (ref 18–48)
MCH RBC QN AUTO: 31.3 PG (ref 27–31)
MCHC RBC AUTO-ENTMCNC: 33.1 G/DL (ref 32–36)
MCV RBC AUTO: 95 FL (ref 82–98)
MONOCYTES # BLD AUTO: 1.2 K/UL (ref 0.3–1)
MONOCYTES NFR BLD: 7.5 % (ref 4–15)
NEUTROPHILS # BLD AUTO: 13.3 K/UL (ref 1.8–7.7)
NEUTROPHILS NFR BLD: 82.2 % (ref 38–73)
NRBC BLD-RTO: 0 /100 WBC
PLATELET # BLD AUTO: 259 K/UL (ref 150–450)
PMV BLD AUTO: 8.8 FL (ref 9.2–12.9)
POTASSIUM SERPL-SCNC: 3.5 MMOL/L (ref 3.5–5.1)
RBC # BLD AUTO: 2.68 M/UL (ref 4–5.4)
SARS-COV-2 RDRP RESP QL NAA+PROBE: NEGATIVE
SODIUM SERPL-SCNC: 137 MMOL/L (ref 136–145)
WBC # BLD AUTO: 16.17 K/UL (ref 3.9–12.7)

## 2022-05-21 PROCEDURE — 25000003 PHARM REV CODE 250: Performed by: STUDENT IN AN ORGANIZED HEALTH CARE EDUCATION/TRAINING PROGRAM

## 2022-05-21 PROCEDURE — 83605 ASSAY OF LACTIC ACID: CPT | Performed by: EMERGENCY MEDICINE

## 2022-05-21 PROCEDURE — 99223 PR INITIAL HOSPITAL CARE,LEVL III: ICD-10-PCS | Mod: ,,, | Performed by: OBSTETRICS & GYNECOLOGY

## 2022-05-21 PROCEDURE — 36415 COLL VENOUS BLD VENIPUNCTURE: CPT | Performed by: EMERGENCY MEDICINE

## 2022-05-21 PROCEDURE — 87075 CULTR BACTERIA EXCEPT BLOOD: CPT | Performed by: OBSTETRICS & GYNECOLOGY

## 2022-05-21 PROCEDURE — 87070 CULTURE OTHR SPECIMN AEROBIC: CPT | Mod: 59 | Performed by: OBSTETRICS & GYNECOLOGY

## 2022-05-21 PROCEDURE — 63600175 PHARM REV CODE 636 W HCPCS: Performed by: OBSTETRICS & GYNECOLOGY

## 2022-05-21 PROCEDURE — 63600175 PHARM REV CODE 636 W HCPCS: Performed by: RADIOLOGY

## 2022-05-21 PROCEDURE — 87077 CULTURE AEROBIC IDENTIFY: CPT | Mod: 59 | Performed by: OBSTETRICS & GYNECOLOGY

## 2022-05-21 PROCEDURE — 87070 CULTURE OTHR SPECIMN AEROBIC: CPT | Performed by: OBSTETRICS & GYNECOLOGY

## 2022-05-21 PROCEDURE — 87102 FUNGUS ISOLATION CULTURE: CPT | Performed by: OBSTETRICS & GYNECOLOGY

## 2022-05-21 PROCEDURE — 87186 SC STD MICRODIL/AGAR DIL: CPT | Performed by: OBSTETRICS & GYNECOLOGY

## 2022-05-21 PROCEDURE — 87205 SMEAR GRAM STAIN: CPT | Performed by: OBSTETRICS & GYNECOLOGY

## 2022-05-21 PROCEDURE — 87076 CULTURE ANAEROBE IDENT EACH: CPT | Performed by: OBSTETRICS & GYNECOLOGY

## 2022-05-21 PROCEDURE — 80048 BASIC METABOLIC PNL TOTAL CA: CPT | Performed by: STUDENT IN AN ORGANIZED HEALTH CARE EDUCATION/TRAINING PROGRAM

## 2022-05-21 PROCEDURE — 85025 COMPLETE CBC W/AUTO DIFF WBC: CPT | Performed by: STUDENT IN AN ORGANIZED HEALTH CARE EDUCATION/TRAINING PROGRAM

## 2022-05-21 PROCEDURE — 20600001 HC STEP DOWN PRIVATE ROOM

## 2022-05-21 PROCEDURE — 63600175 PHARM REV CODE 636 W HCPCS: Performed by: STUDENT IN AN ORGANIZED HEALTH CARE EDUCATION/TRAINING PROGRAM

## 2022-05-21 PROCEDURE — 99223 1ST HOSP IP/OBS HIGH 75: CPT | Mod: ,,, | Performed by: OBSTETRICS & GYNECOLOGY

## 2022-05-21 RX ORDER — ACETAMINOPHEN 325 MG/1
650 TABLET ORAL EVERY 6 HOURS PRN
Status: DISCONTINUED | OUTPATIENT
Start: 2022-05-21 | End: 2022-05-24 | Stop reason: HOSPADM

## 2022-05-21 RX ORDER — FENTANYL CITRATE 50 UG/ML
INJECTION, SOLUTION INTRAMUSCULAR; INTRAVENOUS CODE/TRAUMA/SEDATION MEDICATION
Status: COMPLETED | OUTPATIENT
Start: 2022-05-21 | End: 2022-05-21

## 2022-05-21 RX ORDER — HYDROMORPHONE HYDROCHLORIDE 1 MG/ML
1 INJECTION, SOLUTION INTRAMUSCULAR; INTRAVENOUS; SUBCUTANEOUS EVERY 6 HOURS PRN
Status: DISCONTINUED | OUTPATIENT
Start: 2022-05-21 | End: 2022-05-24 | Stop reason: HOSPADM

## 2022-05-21 RX ADMIN — FENTANYL CITRATE 12.5 MCG: 50 INJECTION, SOLUTION INTRAMUSCULAR; INTRAVENOUS at 11:05

## 2022-05-21 RX ADMIN — IBUPROFEN 600 MG: 600 TABLET ORAL at 02:05

## 2022-05-21 RX ADMIN — SODIUM CHLORIDE: 0.9 INJECTION, SOLUTION INTRAVENOUS at 01:05

## 2022-05-21 RX ADMIN — SODIUM CHLORIDE 1000 ML: 0.9 INJECTION, SOLUTION INTRAVENOUS at 08:05

## 2022-05-21 RX ADMIN — HYDROMORPHONE HYDROCHLORIDE 1 MG: 1 INJECTION, SOLUTION INTRAMUSCULAR; INTRAVENOUS; SUBCUTANEOUS at 11:05

## 2022-05-21 RX ADMIN — VANCOMYCIN HYDROCHLORIDE 750 MG: 750 INJECTION, POWDER, LYOPHILIZED, FOR SOLUTION INTRAVENOUS at 09:05

## 2022-05-21 RX ADMIN — VANCOMYCIN HYDROCHLORIDE 750 MG: 750 INJECTION, POWDER, LYOPHILIZED, FOR SOLUTION INTRAVENOUS at 07:05

## 2022-05-21 RX ADMIN — ACETAMINOPHEN 650 MG: 325 TABLET ORAL at 05:05

## 2022-05-21 RX ADMIN — OXYCODONE HYDROCHLORIDE 10 MG: 10 TABLET ORAL at 12:05

## 2022-05-21 RX ADMIN — PIPERACILLIN SODIUM AND TAZOBACTAM SODIUM 4.5 G: 4; .5 INJECTION, POWDER, LYOPHILIZED, FOR SOLUTION INTRAVENOUS at 08:05

## 2022-05-21 RX ADMIN — PIPERACILLIN SODIUM AND TAZOBACTAM SODIUM 4.5 G: 4; .5 INJECTION, POWDER, LYOPHILIZED, FOR SOLUTION INTRAVENOUS at 04:05

## 2022-05-21 RX ADMIN — SODIUM CHLORIDE: 0.9 INJECTION, SOLUTION INTRAVENOUS at 03:05

## 2022-05-21 RX ADMIN — HYDROMORPHONE HYDROCHLORIDE 1 MG: 1 INJECTION, SOLUTION INTRAMUSCULAR; INTRAVENOUS; SUBCUTANEOUS at 01:05

## 2022-05-21 RX ADMIN — OXYCODONE HYDROCHLORIDE 10 MG: 10 TABLET ORAL at 07:05

## 2022-05-21 RX ADMIN — SODIUM CHLORIDE 500 ML: 0.9 INJECTION, SOLUTION INTRAVENOUS at 05:05

## 2022-05-21 RX ADMIN — PIPERACILLIN SODIUM AND TAZOBACTAM SODIUM 4.5 G: 4; .5 INJECTION, POWDER, LYOPHILIZED, FOR SOLUTION INTRAVENOUS at 12:05

## 2022-05-21 NOTE — SUBJECTIVE & OBJECTIVE
Scheduled Meds:   piperacillin-tazobactam (ZOSYN) IVPB  4.5 g Intravenous Q8H    vancomycin (VANCOCIN) IVPB  15 mg/kg Intravenous Q12H     Continuous Infusions:   sodium chloride 0.9%       PRN Meds:ibuprofen, ondansetron, oxyCODONE, oxyCODONE, prochlorperazine, sodium chloride 0.9%, Pharmacy to dose Vancomycin consult **AND** vancomycin - pharmacy to dose    Review of patient's allergies indicates:  No Known Allergies    Objective:     Vital Signs (Most Recent):  Temp: 98.9 °F (37.2 °C) (05/21/22 0014)  Pulse: 105 (05/21/22 0014)  Resp: 17 (05/21/22 0014)  BP: 117/70 (05/21/22 0014)  SpO2: 99 % (05/21/22 0014) Vital Signs (24h Range):  Temp:  [98.9 °F (37.2 °C)-101.1 °F (38.4 °C)] 98.9 °F (37.2 °C)  Pulse:  [103-117] 105  Resp:  [17-18] 17  SpO2:  [97 %-99 %] 99 %  BP: ()/(61-74) 117/70     Weight: 48.5 kg (107 lb)  Body mass index is 20.22 kg/m².    Intake/Output - Last 3 Shifts       None               Physical Exam:   Constitutional: She is oriented to person, place, and time. She appears well-developed and well-nourished.    HENT:   Head: Normocephalic.    Eyes: EOM are normal.     Cardiovascular:  Normal rate.             Pulmonary/Chest: Effort normal.        Abdominal: Soft. She exhibits abdominal incision (Laparoscopic incisions c/d/i). She exhibits no distension. There is abdominal tenderness (Mild diffuse TTP). There is no rebound and no guarding.     Genitourinary:    Genitourinary Comments: Vaginal cuff appears intact on speculum exam without evidence of dehiscence or abscess. No purulent vaginal discharge or vaginal bleeding. Bimanual exam benign and entire cuff palpated gently with no defects noted             Musculoskeletal: Moves all extremeties.       Neurological: She is alert and oriented to person, place, and time.    Skin: Skin is warm and dry.    Psychiatric: She has a normal mood and affect. Her behavior is normal.     Lines/Drains/Airways       Peripheral Intravenous Line  Duration                   Peripheral IV - Single Lumen 05/20/22 1920 20 G Left Antecubital <1 day                    Laboratory:  CBC:   Recent Labs   Lab 05/20/22 1956   WBC 18.55*   HGB 11.3*   HCT 33.4*      , CMP:   Recent Labs   Lab 05/20/22 1956   *   K 3.8   CL 98   CO2 24   *   BUN 5*   CREATININE 0.6   CALCIUM 9.4   PROT 8.6*   ALBUMIN 3.1*   BILITOT 0.8   ALKPHOS 92   AST 47*   ALT 42   ANIONGAP 11   EGFRNONAA >60.0   , Urine Studies:   Recent Labs   Lab 05/20/22 2049   COLORU Yellow   APPEARANCEUA Clear   PHUR 6.0   SPECGRAV 1.010   PROTEINUA Negative   GLUCUA Negative   KETONESU Trace*   BILIRUBINUA Negative   OCCULTUA 1+*   NITRITE Negative   LEUKOCYTESUR Negative   RBCUA 1   WBCUA 3   BACTERIA Occasional   SQUAMEPITHEL 4   , and All pertinent labs from the last 24 hours have been reviewed.    Diagnostic Results:  CT ABDOMEN PELVIS WITHOUT CONTRAST     CLINICAL HISTORY:  Abdominal abscess/infection suspected;     TECHNIQUE:  Routine axial CT images of the abdomen and pelvis were obtained without contrast.  .  Coronal and Sagittal reformatted images were also obtained.     COMPARISON:  No convincing PET-CT 04/21/2022; chest radiograph 05/20/2022     FINDINGS:  The lung bases are unremarkable.  There is no pleural fluid present.  The visualized portions of the heart appear normal.     The liver is mildly enlarged measuring 18 cm with no focal hepatic abnormality.  The gallbladder shows no evidence of stones or pericholecystic fluid.  There is no intra-or extrahepatic biliary ductal dilatation.     The spleen is borderline mildly enlarged.  The stomach, pancreas, and adrenal glands are unremarkable.     The kidneys are normal in size and location.  There is no evidence of hydronephrosis.     The abdominal aorta is normal in course and caliber without significant atherosclerotic calcifications.     The visualized loops of small and large bowel show no evidence of obstruction or inflammation.      The osseous structures are intact without suspicious osseous lesions.     Postoperative changes of left salpingo-oophorectomy for reported neoplasm.     Interval changes of recent hysterectomy with right salpingo-oophorectomy, appendectomy, omentectomy and bilateral pelvic and periaortic lymph node dissection.     New multiple fluid collections in the pelvis.  Retro vesicular fluid collection measures 6.6 x 5.8 x 6.3 cm.  This collection abuts the sigmoid colon.  Right pelvic sidewall collection measures 4.3 x 2.5 x 4.4 cm.  This collection abuts the right iliac/femoral vessels.     Generalized stranding edema of the pelvis and mesentery to a lesser extent.  Trace free fluid.     There is circumferential thickening of the urinary bladder allowing for nondistention.     There is scattered extraperitoneal small gas foci along the periphery of the pelvis with small foci extending along the lateral aspect of the pericolonic gutter.  There are small scattered gas foci along the retroperitoneal posterior aspect of the psoas soft tissues.  Difficult to exclude trace peripheral intraperitoneal free air.     There is extensive subcutaneous emphysema about the body wall anteriorly and laterally.  Several gas foci extend along the bilateral inguinal areas in proximal thighs predominant on the left with extension into the inter fascial tissues of the left lower extremity inferiorly out of view.  Please refer to CT of the left thigh for further evaluation.     Impression:     Interval operative changes of hysterectomy, right salpingo-oophorectomy, reported omentectomy, and bilateral pelvic/periaortic lymph node dissection.     Retro vesicular and right pelvic sidewall collections measuring up to 6.6 cm abutting the sigmoid colon and right pelvic vasculature respectively.  Findings favored to represent hematomas with differential consideration including abscesses, attention on follow-up.     Scattered small amounts of  extraperitoneal intra-abdominopelvic air along the periphery of the pelvis and similar scattered small amounts of retroperitoneal air.  The etiology is uncertain. Difficult to exclude trace residual intraperitoneal free air given the amount of extraperitoneal air.     Extensive soft tissue air throughout the body wall anteriorly and laterally extending into the inguinal areas and proximal thighs predominately on the left along the inter fascial tissues inferiorly out of view.  No significant inflammatory changes or fluid collections to suggest infectious process or abscess.  Suspect iatrogenic etiology of air from recent laparoscopic surgery.  Recommend clinical correlation for possible gas-forming infection which is not entirely excluded.     Thickening of the urinary bladder wall which may be reactive.  Recommend clinical correlation with urinalysis, malignant involvement not entirely excluded.     Hepatomegaly and borderline splenomegaly.

## 2022-05-21 NOTE — HPI
Francis Nagel is a 31 y.o. 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 She presented on POD 1 to the ED with lower extremity swelling and pain and was noted to have extensive crepitus and subcutaneous air, likely from malpositioned trocar placement and insufflation.  She presents today with fevers and chills with a temp of 101 at home as well as moderate abdominal pain partially improved with norco and ibuprofen. She denies nausea/vomiting. She denies dysuria. Reports last BM a few hours ago. Denies foul smelling vaginal discharge or significant vaginal bleeding. She is ambulating without difficulty. She reports her left lower extremity is no longer painful.

## 2022-05-21 NOTE — HPI
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 She presents to the ED today with severe lower abdominal pain associated with fevers and chills as well as nausea, dysuria, and headaches. She came to the ED for leg pain POD 1 and was noted to have extensive crepitus and subcutaneous air, likely from malpositioned trocar placement and insufflation. She had a CT done today that showed the same as well as what appears to be two pelvic fluid collections. Surgery consulted for possible necrotizing fasciitis. Patient endorses no leg pain or skin changes of any kind.

## 2022-05-21 NOTE — PLAN OF CARE
Pt arrived to Interventional Radiology room 173 via stretcher for abscess drain placement.  Plan of care reviewed with patient, patient verbalized understanding.  Name verified using two identifiers.  Allergies verified.  Labs, orders and consent reviewed on chart.  Pt oriented to unit and staff.  See flow sheet for documentation, monitoring and medication administration. Will continue to monitor.

## 2022-05-21 NOTE — ED NOTES
Pt presents to ED with c/o fever and lower ABD pain, pt had complete hysterectomy and oophorectomy one week ago

## 2022-05-21 NOTE — PROCEDURES
Radiology Post-Procedure Note    Pre Op Diagnosis: Pelvic abscess  Post Op Diagnosis: Same    Procedure: CT guided abscess drain placement    Procedure performed by: Omid Valle MD    Written Informed Consent Obtained: Yes  Specimen Removed: YES 90mL bloody, purulent fluid  Estimated Blood Loss: Minimal    Findings:   CT guided drain placement in pelvic fluid collection.  10F APD.  90mL bloody, purulent fluid removed.  No complications.    Patient tolerated procedure well.    Omid Valle MD  Diagnostic and Interventional Radiologist  Department of Radiology  Pager: [phone]

## 2022-05-21 NOTE — PLAN OF CARE
Problem: Adult Inpatient Plan of Care  Goal: Plan of Care Review  Outcome: Ongoing, Progressing  Goal: Patient-Specific Goal (Individualized)  Outcome: Ongoing, Progressing  Goal: Absence of Hospital-Acquired Illness or Injury  Outcome: Ongoing, Progressing  Goal: Optimal Comfort and Wellbeing  Outcome: Ongoing, Progressing  Goal: Readiness for Transition of Care  Outcome: Ongoing, Progressing     Problem: Fall Injury Risk  Goal: Absence of Fall and Fall-Related Injury  Outcome: Ongoing, Progressing     Problem: Pain Acute  Goal: Acceptable Pain Control and Functional Ability  Outcome: Ongoing, Progressing     Problem: Malnutrition  Goal: Improved Nutritional Intake  Outcome: Ongoing, Progressing     Problem: Nausea and Vomiting  Goal: Fluid and Electrolyte Balance  Outcome: Ongoing, Progressing    Pt has been stable and calm through out shift. Pt was in a lot of pain when she came back from getting her drain placed. Her pain medication was increased in order to help with her new pain from the procedure. Pain medication was effective pt stated her pain is now tolerable rating it at a 2.

## 2022-05-21 NOTE — ED PROVIDER NOTES
ED Resident HAND-OFF NOTE:    I received signout from the previous provider.     Pertinent history and exam:  Francis Nagel is a 31 y.o. female with pertinent PMH of total hysterectomy (5/13) secondary to a malignant neoplasm of the ovary who presents to the ED complaining of fever and abdominal pain. Physical exam also concerning for crepitus in LLE. Full septic workup initiated. Vanc and zosyn given. Lactic acid is wnl. CBC shows leukocytosis. General surgery consulted and do not believe there is nec fasc. Gyn onc consulted.    Vitals:    05/21/22 0111   BP: 112/71   Pulse: 99   Resp: 16   Temp: 98.9 °F (37.2 °C)       Pending Items:  Gyn-onc consult    Imaging Studies:    CT Thigh Without Contrast Left   Final Result   Abnormal      Interval recent operative changes.  Please CT abdomen pelvis of same date for details of the abdomen and pelvis.      Extensive gas foci extend along the superficial and deep fascial planes from the abdominal wall and left inguinal region through the entirety of visualized left lower extremity.  No soft tissue inflammatory changes, fluid collections, or masses allowing for noncontrast technique.  The gas appears to extend along the course of the femoral vessels from the left groin through the left leg to the popliteal fossa as well as along the subcutaneous soft tissue planes the left lower leg.  Findings favored to be iatrogenic postoperative in etiology from recent laparoscopic surgery.  Infectious process not entirely excluded, recommend clinical correlation.      This report was flagged in Epic as abnormal.      Electronically signed by resident: Carlos Perkins   Date:    05/20/2022   Time:    22:14      Electronically signed by: Cornelio Vazquez MD   Date:    05/20/2022   Time:    22:58      CT Leg (Tibia-Fibula) Without Contrast Left   Final Result   Abnormal      Interval recent operative changes.  Please CT abdomen pelvis of same date for details of the abdomen and pelvis.       Extensive gas foci extend along the superficial and deep fascial planes from the abdominal wall and left inguinal region through the entirety of visualized left lower extremity.  No soft tissue inflammatory changes, fluid collections, or masses allowing for noncontrast technique.  The gas appears to extend along the course of the femoral vessels from the left groin through the left leg to the popliteal fossa as well as along the subcutaneous soft tissue planes the left lower leg.  Findings favored to be iatrogenic postoperative in etiology from recent laparoscopic surgery.  Infectious process not entirely excluded, recommend clinical correlation.      This report was flagged in Epic as abnormal.      Electronically signed by resident: Carlos Perkins   Date:    05/20/2022   Time:    22:14      Electronically signed by: Cornelio Vazquez MD   Date:    05/20/2022   Time:    22:58      CT Abdomen Pelvis  Without Contrast   Final Result   Abnormal      Interval operative changes of hysterectomy, right salpingo-oophorectomy, reported omentectomy, and bilateral pelvic/periaortic lymph node dissection.      Retro vesicular and right pelvic sidewall collections measuring up to 6.6 cm abutting the sigmoid colon and right pelvic vasculature respectively.  Findings favored to represent hematomas with differential consideration including abscesses, attention on follow-up.      Scattered small amounts of extraperitoneal intra-abdominopelvic air along the periphery of the pelvis and similar scattered small amounts of retroperitoneal air.  The etiology is uncertain. Difficult to exclude trace residual intraperitoneal free air given the amount of extraperitoneal air.      Extensive soft tissue air throughout the body wall anteriorly and laterally extending into the inguinal areas and proximal thighs predominately on the left along the inter fascial tissues inferiorly out of view.  No significant inflammatory changes or fluid  collections to suggest infectious process or abscess.  Suspect iatrogenic etiology of air from recent laparoscopic surgery.  Recommend clinical correlation for possible gas-forming infection which is not entirely excluded.      Thickening of the urinary bladder wall which may be reactive.  Recommend clinical correlation with urinalysis, malignant involvement not entirely excluded.      Hepatomegaly and borderline splenomegaly.      This report was flagged in Epic as abnormal.      Electronically signed by resident: Carlos Perkins   Date:    05/20/2022   Time:    21:28      Electronically signed by: Cornelio Vazquez MD   Date:    05/20/2022   Time:    22:31      X-Ray Tibia Fibula 2 View Left   Final Result   Abnormal      No acute fracture or bone destruction.      Extensive soft tissue air identified in the left leg and included distal left thigh.  Correlate clinically for gas-forming soft tissue infection.      This report was flagged in Epic as abnormal.      The critical information above was relayed directly by Cornelio Vazquez MD by [facility] secure chat to [facility] on 5/20/2022 at 20:55.         Electronically signed by: Cornelio Vazquez MD   Date:    05/20/2022   Time:    20:56      X-Ray Chest AP Portable   Final Result   Abnormal      No acute findings in the chest.      Trace pneumoperitoneum seen in the medial right hemidiaphragm and air in the soft tissues of the upper abdominal wall bilaterally in the flanks.  Findings may be related to recent abdominal surgery May 13, 2022 but suggest appropriate clinical correlation with physical exam findings of the abdomen.      This report was flagged in Epic as abnormal.         Electronically signed by: Cornelio Vazquez MD   Date:    05/20/2022   Time:    20:23          Medications Given:  Medications   sodium chloride 0.9% flush 10 mL (has no administration in time range)   ondansetron disintegrating tablet 8 mg (has no administration in time range)    prochlorperazine injection Soln 5 mg (has no administration in time range)   0.9%  NaCl infusion ( Intravenous New Bag 5/21/22 0100)   ibuprofen tablet 600 mg (has no administration in time range)   oxyCODONE immediate release tablet 5 mg (has no administration in time range)   oxyCODONE immediate release tablet 10 mg (has no administration in time range)   vancomycin - pharmacy to dose (has no administration in time range)   piperacillin-tazobactam 4.5 g in sodium chloride 0.9% 100 mL IVPB (ready to mix system) (has no administration in time range)   vancomycin 750 mg in dextrose 5 % 250 mL IVPB (ready to mix system) (has no administration in time range)   lactated ringers bolus 1,455 mL (0 mL/kg × 48.5 kg Intravenous Stopped 5/20/22 2112)   vancomycin in dextrose 5 % 1 gram/250 mL IVPB 1,000 mg (0 mg Intravenous Stopped 5/20/22 2200)   piperacillin-tazobactam 4.5 g in sodium chloride 0.9% 100 mL IVPB (ready to mix system) (0 g Intravenous Stopped 5/21/22 0015)   ibuprofen 100 mg/5 mL suspension 200 mg (200 mg Oral Given 5/20/22 2200)       ED Course:  Patient admitted to Gyn-Onc service for further management.    Diagnostic Impression:  1. Acute febrile illness    2. Tachycardia    3. Leg pain    4. Leg pain    5. Leukocytosis, unspecified type    6. Post-operative wound abscess        Dispo: Admit    I have discussed and counseled the patient and/or family regarding exam, results, diagnosis, treatment, and plan. Patient and/or family understands the plan and is in agreement, verbalized understanding, and had questions answered.      ______________________  Holly Briggs MD   Emergency Medicine Resident  5/21/2022         Holly Briggs MD  Resident  05/21/22 [id number]       Paola Garibay MD  05/23/22 [id number]

## 2022-05-21 NOTE — HOSPITAL COURSE
05/21/2022- POD8 s/p RA-TLH/RSO/pelvic and para aortic LND/omentectomy/peritoneal biopsy/appendectomy admitted for post operative pelvic abscess. Febrile to 101.1 in ED. Multiple small fluid collections in the pelvis largest measuring 6.6 x 5.8 x 6.3 cm. NPO for potential IR drainage of pelvic fluid collections. Broad spectrum abx vanc/zosyn continued on admit. Subcutaneous air noted in left lower extremity- evaluated by general surgery and no evidence of necrotizing fascitis. IR consulted for drainage of likely pelvic abscess.  05/22/2022- POD 1 s/p IR drainage pelvic abscess/POD#9 s/p s/p RA-TLH/RSO/LND/OMX/peritoneal bx/appy. IR drained 90 cc of purulent fluid yesterday with drain output 40cc overnight. This AM drainage is serosanguinous. Has been afebrile since AM of 5/21 and WBC has normalized. Gram stain of fluid collection with gram neg rods and prelim blood cultures with gram negative rods. Anaerobic and aerobic cultures of fluid collection pending. Vancomycin stopped and zosyn continued until cultures result.   05/23/2022 - POD#2 s/p IR drainage pelvic abscess/POD#10 s/p RA-TLH/RSO/LND/OMX/peritoneal bx/appy. Drain output 10 cc of serosanginous fluid in past 24 hours. Patient remains afebrile. Continued on Zosyn while awaiting sensitivities of cultures. Potassium replaced appropriately.  05/24/2022 POD#3 sp IR drainage pelvic abscess/[id number] sp RA-TLH/RSO/LND/OMX/Peritnoneal Bx/Appy. 6 cc of serosanginous fluid in past 24 hours. Patient remains afebrile. Continued on Zosyn while awaiting sensitivities of cultures. Pathology returned all specimen negative for carcinoma.     Aerobic culture and body fluid culture both resulted positive for gram negative rods (klebsiella) with senstivities to Bactrim. Called micro and anerobic culture and fungal culture will take 5 days to result. Discussed with Dr. Delacruz and as patient afebrile since admission, WBC and CRP downtrending on zosyn. Will discharge patient with  cultures pending on bactrim DS for 14 days. Discussed all the above with patient. Will continue to follow up on her cultures and if anything returns positive and requires an additional antibiotic, we will call her. IR Drain pulled at bedside.     She has a follow up appointment with Dr. Delacruz set for next week 5/31.

## 2022-05-21 NOTE — ED PROVIDER NOTES
Encounter Date: 5/20/2022       History     Chief Complaint   Patient presents with    Fever     Hx malignant neoplasm of ovary, has hysterectomy on 5/13. States fever today of 101.6, last Tylenol 1 hour ago. States dizziness and nasuea     31-year-old female with past medical history of total hysterectomy (5/13) secondary to a malignant neoplasm of the ovary who presents to the ED complaining of a fever with T-max 101.7° that onset yesterday.  Patient also reports some associated dizziness, nausea, chills, suprapubic abdominal pain, dysuria and headache.  Patient went to an ED complaining of left lower extremity edema and pain the day after her surgery where she had an ultrasound done and was discharged.  Patient states that she has been taking a medication she was prescribed the pain, which upon chart review is revealed to be hydrocodone.  Patient denies chest pain, shortness of breath, headache, vomiting, diarrhea, weakness, fatigue, hematuria.  No other complaints at this time.    The history is provided by the patient, a relative and medical records.     Review of patient's allergies indicates:  No Known Allergies  Past Medical History:   Diagnosis Date    Anemia 03/11/2022    Encounter for blood transfusion 03/11/2022    Mass of left ovary 03/10/2022    JOSE MIGUEL.     LEFT OVARY SHOWS ADENOCARCINOMA, INTESTINAL PHENOTYPE MEASURING 15 CM    Metastatic cancer 4/11/2022    PONV (postoperative nausea and vomiting)     S/P unilateral salpingo-oophorectomy/mass resection 03/10/2022     Past Surgical History:   Procedure Laterality Date    COLONOSCOPY N/A 4/11/2022    Procedure: COLONOSCOPY;  Surgeon: Eric Meléndez Jr., MD;  Location: [facility];  Service: Endoscopy;  Laterality: N/A;    ESOPHAGOGASTRODUODENOSCOPY N/A 4/11/2022    Procedure: EGD (ESOPHAGOGASTRODUODENOSCOPY);  Surgeon: Eric Meléndez Jr., MD;  Location: [facility];  Service: Endoscopy;  Laterality: N/A;    ROBOT-ASSISTED APPENDECTOMY   5/13/2022    Procedure: ROBOTIC APPENDECTOMY;  Surgeon: Charmaine Delacruz MD;  Location: [facility];  Service: OB/GYN;;    ROBOT-ASSISTED LAPAROSCOPIC ABDOMINAL HYSTERECTOMY USING DA DARVIN XI N/A 5/13/2022    Procedure: XI ROBOTIC HYSTERECTOMY;  Surgeon: Charmaine Delacruz MD;  Location: [facility];  Service: OB/GYN;  Laterality: N/A;    ROBOT-ASSISTED LAPAROSCOPIC OMENTECTOMY USING DA DARVIN XI N/A 5/13/2022    Procedure: XI ROBOTIC OMENTECTOMY;  Surgeon: Charmaine Delacruz MD;  Location: [facility];  Service: OB/GYN;  Laterality: N/A;    ROBOT-ASSISTED LAPAROSCOPIC SALPINGO-OOPHORECTOMY USING DA DARVIN XI Left 3/10/2022    Procedure: XI ROBOTIC SALPINGO-OOPHORECTOMY/ USO;  Surgeon: Charmaine Delacruz MD;  Location: [address];  Service: OB/GYN;  Laterality: Left;    ROBOT-ASSISTED LAPAROSCOPIC SURGICAL REMOVAL OF OVARY USING DA DARVIN XI Right 5/13/2022    Procedure: XI ROBOTIC OOPHORECTOMY;  Surgeon: Charmaine Delacruz MD;  Location: [facility];  Service: OB/GYN;  Laterality: Right;    ROBOT-ASSISTED SURGICAL REMOVAL OF FALLOPIAN TUBE USING DA DARVIN XI Bilateral 5/13/2022    Procedure: XI ROBOTIC SALPINGECTOMY;  Surgeon: Charmaine Delacruz MD;  Location: [facility];  Service: OB/GYN;  Laterality: Bilateral;     Family History   Problem Relation Age of Onset    Breast cancer Neg Hx     Colon cancer Neg Hx     Ovarian cancer Neg Hx      Social History     Tobacco Use    Smoking status: Never Smoker    Smokeless tobacco: Never Used   Substance Use Topics    Alcohol use: Yes     Comment: twice a month    Drug use: Never     Review of Systems   Constitutional: Positive for chills and fever. Negative for activity change.   HENT: Negative for congestion, ear pain and sore throat.    Respiratory: Negative for shortness of breath and stridor.    Cardiovascular: Negative for chest pain and palpitations.   Gastrointestinal: Positive for abdominal pain and nausea. Negative for vomiting.        Suprapubic abdominal pain   Genitourinary:  Positive for dysuria.   Musculoskeletal: Negative for back pain.   Skin: Negative for rash.   Neurological: Positive for dizziness and headaches. Negative for syncope and weakness.   Hematological: Does not bruise/bleed easily.       Physical Exam     Initial Vitals [05/20/22 1918]   BP Pulse Resp Temp SpO2   98/61 (!) 117 18 99.7 °F (37.6 °C) 97 %      MAP       --         Physical Exam    Nursing note and vitals reviewed.  Constitutional: Vital signs are normal. She appears well-developed and well-nourished. She is not diaphoretic. No distress.   HENT:   Head: Normocephalic and atraumatic.   Right Ear: External ear normal.   Left Ear: External ear normal.   Eyes: EOM are normal. Right eye exhibits no discharge. Left eye exhibits no discharge.   Neck: Trachea normal. Neck supple. No thyroid mass present.   Normal range of motion.  Cardiovascular: Regular rhythm, normal heart sounds and intact distal pulses. Exam reveals no gallop and no friction rub.    No murmur heard.  Borderline tachycardic on exam.   Pulmonary/Chest: Breath sounds normal. No respiratory distress. She has no wheezes. She has no rhonchi. She has no rales.   Abdominal: Abdomen is soft. Bowel sounds are normal. She exhibits no distension. There is abdominal tenderness.   Tenderness to palpation in the left lower quadrant, suprapubic area and right lower quadrant. There is no rebound and no guarding.   Musculoskeletal:         General: No tenderness or edema.      Cervical back: Normal range of motion and neck supple.      Comments: Crepitus appreciated in the left lower extremity from ankle to just above the knee. Pictures included in the media tab for external skin finding at time of evaluation.     Neurological: She is alert and oriented to person, place, and time. She has normal strength. No cranial nerve deficit or sensory deficit. GCS score is 15. GCS eye subscore is 4. GCS verbal subscore is 5. GCS motor subscore is 6.   Skin: Skin is warm  and dry. Capillary refill takes less than 2 seconds. No rash noted.   Pictures included in the media tab for external skin finding at time of evaluation of LLE.    Psychiatric: She has a normal mood and affect.         ED Course   Procedures  Labs Reviewed   CBC W/ AUTO DIFFERENTIAL - Abnormal; Notable for the following components:       Result Value    WBC 18.55 (*)     RBC 3.56 (*)     Hemoglobin 11.3 (*)     Hematocrit 33.4 (*)     MCH 31.7 (*)     MPV 8.6 (*)     Immature Granulocytes 1.0 (*)     Gran # (ANC) 15.2 (*)     Immature Grans (Abs) 0.19 (*)     Mono # 1.6 (*)     Gran % 82.0 (*)     Lymph % 8.0 (*)     All other components within normal limits   COMPREHENSIVE METABOLIC PANEL - Abnormal; Notable for the following components:    Sodium 133 (*)     Glucose 118 (*)     BUN 5 (*)     Total Protein 8.6 (*)     Albumin 3.1 (*)     AST 47 (*)     All other components within normal limits   URINALYSIS, REFLEX TO URINE CULTURE - Abnormal; Notable for the following components:    Ketones, UA Trace (*)     Occult Blood UA 1+ (*)     All other components within normal limits    Narrative:     Specimen Source->Urine   C-REACTIVE PROTEIN - Abnormal; Notable for the following components:    .1 (*)     All other components within normal limits    Narrative:     ADD ON CRP PER DR RANDOLPH YODER/ORDER# [id number] @ 21:45 5/20/2022   LACTIC ACID, PLASMA   URINALYSIS MICROSCOPIC    Narrative:     Specimen Source->Urine   C-REACTIVE PROTEIN   SARS-COV-2 RDRP GENE        ECG Results          EKG 12-lead (In process)  Result time 05/21/22 10:55:51    In process by Interface, Lab In [facility] (05/21/22 10:55:51)                 Narrative:    Test Reason : R00.0,    Vent. Rate : 110 BPM     Atrial Rate : 110 BPM     P-R Int : 132 ms          QRS Dur : 082 ms      QT Int : 354 ms       P-R-T Axes : 068 088 062 degrees     QTc Int : 479 ms    Sinus tachycardia  Nonspecific ST and T wave abnormality  Abnormal ECG  When  compared with ECG of 14-MAY-2022 20:39,  ST elevation now present in Inferior leads    Referred By: [email or web address]   SELF           Confirmed By:                             Imaging Results           CT Thigh Without Contrast Left (Final result)  Result time 05/20/22 22:58:12    Final result by Cornelio Vazquez MD (05/20/22 22:58:12)                 Impression:      Interval recent operative changes.  Please CT abdomen pelvis of same date for details of the abdomen and pelvis.    Extensive gas foci extend along the superficial and deep fascial planes from the abdominal wall and left inguinal region through the entirety of visualized left lower extremity.  No soft tissue inflammatory changes, fluid collections, or masses allowing for noncontrast technique.  The gas appears to extend along the course of the femoral vessels from the left groin through the left leg to the popliteal fossa as well as along the subcutaneous soft tissue planes the left lower leg.  Findings favored to be iatrogenic postoperative in etiology from recent laparoscopic surgery.  Infectious process not entirely excluded, recommend clinical correlation.    This report was flagged in Epic as abnormal.    Electronically signed by resident: Carlos Perkins  Date:    05/20/2022  Time:    22:14    Electronically signed by: Cornelio Vazquez MD  Date:    05/20/2022  Time:    22:58             Narrative:    EXAMINATION:  CT THIGH WITHOUT CONTRAST LEFT; CT LEG (TIBIA-FIBULA) WITHOUT CONTRAST LEFT    CLINICAL HISTORY:  Soft tissue mass, thigh, US/xray nondiagnostic;air in soft tissue, concern for infection;; Soft tissue infection suspected, lower leg, xray done;.    TECHNIQUE:  Axial images of the left lower extremity from above the left hip through the midfoot obtained without intravenous contrast.  Sagittal and coronal reformats obtained.    COMPARISON:  CT abdomen pelvis 05/20/2022    FINDINGS:  Reported interval operative changes of hysterectomy, right  salpingo-oophorectomy, lymph node dissection, appendectomy, omentectomy.    Please see CT abdomen pelvis examination from same day for evaluation of the abdomen pelvis.    Extensive gas foci extend along the superficial fascial and deep fascial planes tracking from the left inguinal and abdominal wall.  No soft tissue inflammatory changes, fluid collections or masses of the left lower extremity allowing for noncontrast technique.    No acute fracture or suspicious osseous lesions.  No dislocation.                                CT Leg (Tibia-Fibula) Without Contrast Left (Final result)  Result time 05/20/22 22:58:12    Final result by Cornelio Vazquez MD (05/20/22 22:58:12)                 Impression:      Interval recent operative changes.  Please CT abdomen pelvis of same date for details of the abdomen and pelvis.    Extensive gas foci extend along the superficial and deep fascial planes from the abdominal wall and left inguinal region through the entirety of visualized left lower extremity.  No soft tissue inflammatory changes, fluid collections, or masses allowing for noncontrast technique.  The gas appears to extend along the course of the femoral vessels from the left groin through the left leg to the popliteal fossa as well as along the subcutaneous soft tissue planes the left lower leg.  Findings favored to be iatrogenic postoperative in etiology from recent laparoscopic surgery.  Infectious process not entirely excluded, recommend clinical correlation.    This report was flagged in Epic as abnormal.    Electronically signed by resident: Carlos Perkins  Date:    05/20/2022  Time:    22:14    Electronically signed by: Cornelio Vazquez MD  Date:    05/20/2022  Time:    22:58             Narrative:    EXAMINATION:  CT THIGH WITHOUT CONTRAST LEFT; CT LEG (TIBIA-FIBULA) WITHOUT CONTRAST LEFT    CLINICAL HISTORY:  Soft tissue mass, thigh, US/xray nondiagnostic;air in soft tissue, concern for infection;; Soft  tissue infection suspected, lower leg, xray done;.    TECHNIQUE:  Axial images of the left lower extremity from above the left hip through the midfoot obtained without intravenous contrast.  Sagittal and coronal reformats obtained.    COMPARISON:  CT abdomen pelvis 05/20/2022    FINDINGS:  Reported interval operative changes of hysterectomy, right salpingo-oophorectomy, lymph node dissection, appendectomy, omentectomy.    Please see CT abdomen pelvis examination from same day for evaluation of the abdomen pelvis.    Extensive gas foci extend along the superficial fascial and deep fascial planes tracking from the left inguinal and abdominal wall.  No soft tissue inflammatory changes, fluid collections or masses of the left lower extremity allowing for noncontrast technique.    No acute fracture or suspicious osseous lesions.  No dislocation.                                CT Abdomen Pelvis  Without Contrast (Final result)  Result time 05/20/22 22:31:11    Final result by Cornelio Vazquez MD (05/20/22 22:31:11)                 Impression:      Interval operative changes of hysterectomy, right salpingo-oophorectomy, reported omentectomy, and bilateral pelvic/periaortic lymph node dissection.    Retro vesicular and right pelvic sidewall collections measuring up to 6.6 cm abutting the sigmoid colon and right pelvic vasculature respectively.  Findings favored to represent hematomas with differential consideration including abscesses, attention on follow-up.    Scattered small amounts of extraperitoneal intra-abdominopelvic air along the periphery of the pelvis and similar scattered small amounts of retroperitoneal air.  The etiology is uncertain. Difficult to exclude trace residual intraperitoneal free air given the amount of extraperitoneal air.    Extensive soft tissue air throughout the body wall anteriorly and laterally extending into the inguinal areas and proximal thighs predominately on the left along the inter  fascial tissues inferiorly out of view.  No significant inflammatory changes or fluid collections to suggest infectious process or abscess.  Suspect iatrogenic etiology of air from recent laparoscopic surgery.  Recommend clinical correlation for possible gas-forming infection which is not entirely excluded.    Thickening of the urinary bladder wall which may be reactive.  Recommend clinical correlation with urinalysis, malignant involvement not entirely excluded.    Hepatomegaly and borderline splenomegaly.    This report was flagged in Epic as abnormal.    Electronically signed by resident: Carlos Perkins  Date:    05/20/2022  Time:    21:28    Electronically signed by: Cornelio Vazquez MD  Date:    05/20/2022  Time:    22:31             Narrative:    EXAMINATION:  CT ABDOMEN PELVIS WITHOUT CONTRAST    CLINICAL HISTORY:  Abdominal abscess/infection suspected;    TECHNIQUE:  Routine axial CT images of the abdomen and pelvis were obtained without contrast.  .  Coronal and Sagittal reformatted images were also obtained.    COMPARISON:  No convincing PET-CT 04/21/2022; chest radiograph 05/20/2022    FINDINGS:  The lung bases are unremarkable.  There is no pleural fluid present.  The visualized portions of the heart appear normal.    The liver is mildly enlarged measuring 18 cm with no focal hepatic abnormality.  The gallbladder shows no evidence of stones or pericholecystic fluid.  There is no intra-or extrahepatic biliary ductal dilatation.    The spleen is borderline mildly enlarged.  The stomach, pancreas, and adrenal glands are unremarkable.    The kidneys are normal in size and location.  There is no evidence of hydronephrosis.    The abdominal aorta is normal in course and caliber without significant atherosclerotic calcifications.    The visualized loops of small and large bowel show no evidence of obstruction or inflammation.    The osseous structures are intact without suspicious osseous  lesions.    Postoperative changes of left salpingo-oophorectomy for reported neoplasm.    Interval changes of recent hysterectomy with right salpingo-oophorectomy, appendectomy, omentectomy and bilateral pelvic and periaortic lymph node dissection.    New multiple fluid collections in the pelvis.  Retro vesicular fluid collection measures 6.6 x 5.8 x 6.3 cm.  This collection abuts the sigmoid colon.  Right pelvic sidewall collection measures 4.3 x 2.5 x 4.4 cm.  This collection abuts the right iliac/femoral vessels.    Generalized stranding edema of the pelvis and mesentery to a lesser extent.  Trace free fluid.    There is circumferential thickening of the urinary bladder allowing for nondistention.    There is scattered extraperitoneal small gas foci along the periphery of the pelvis with small foci extending along the lateral aspect of the pericolonic gutter.  There are small scattered gas foci along the retroperitoneal posterior aspect of the psoas soft tissues.  Difficult to exclude trace peripheral intraperitoneal free air.    There is extensive subcutaneous emphysema about the body wall anteriorly and laterally.  Several gas foci extend along the bilateral inguinal areas in proximal thighs predominant on the left with extension into the inter fascial tissues of the left lower extremity inferiorly out of view.  Please refer to CT of the left thigh for further evaluation.                                X-Ray Tibia Fibula 2 View Left (Final result)  Result time 05/20/22 20:56:18    Final result by Cornelio Vazquez MD (05/20/22 20:56:18)                 Impression:      No acute fracture or bone destruction.    Extensive soft tissue air identified in the left leg and included distal left thigh.  Correlate clinically for gas-forming soft tissue infection.    This report was flagged in Epic as abnormal.    The critical information above was relayed directly by Cornelio Vazquez MD by [facility] secure chat to [facility]  [place] on 5/20/2022 at 20:55.      Electronically signed by: Cornelio Vazquez MD  Date:    05/20/2022  Time:    20:56             Narrative:    EXAMINATION:  XR TIBIA FIBULA 2 VIEW LEFT    CLINICAL HISTORY:  Pain in leg, unspecified    TECHNIQUE:  AP and lateral views of the left tibia and fibula were performed.    COMPARISON:  None.    FINDINGS:  No acute fracture or dislocation.  No bone destruction.  Extensive soft tissue air identified in the left leg and included distal left thigh.                                X-Ray Chest AP Portable (Final result)  Result time 05/20/22 20:23:30    Final result by Cornelio Vazquez MD (05/20/22 20:23:30)                 Impression:      No acute findings in the chest.    Trace pneumoperitoneum seen in the medial right hemidiaphragm and air in the soft tissues of the upper abdominal wall bilaterally in the flanks.  Findings may be related to recent abdominal surgery May 13, 2022 but suggest appropriate clinical correlation with physical exam findings of the abdomen.    This report was flagged in Epic as abnormal.      Electronically signed by: Cornelio Vazquez MD  Date:    05/20/2022  Time:    20:23             Narrative:    EXAMINATION:  XR CHEST AP PORTABLE    CLINICAL HISTORY:  Sepsis;    TECHNIQUE:  Single frontal view of the chest was performed.    COMPARISON:  03/12/2022.    FINDINGS:  Trace pneumoperitoneum seen in the medial right hemidiaphragm.  Air in the soft tissues of the upper abdominal wall bilaterally in the flanks.    No consolidation, pleural effusion or pneumothorax.    Cardiomediastinal silhouette is unremarkable.                                 Medications   sodium chloride 0.9% flush 10 mL (has no administration in time range)   ondansetron disintegrating tablet 8 mg (has no administration in time range)   prochlorperazine injection Soln 5 mg (has no administration in time range)   0.9%  NaCl infusion ( Intravenous New Bag 5/21/22 0300)   ibuprofen tablet  600 mg (600 mg Oral Given 5/21/22 0207)   oxyCODONE immediate release tablet 5 mg (has no administration in time range)   oxyCODONE immediate release tablet Tab 10 mg (10 mg Oral Given 5/21/22 1254)   vancomycin - pharmacy to dose (has no administration in time range)   piperacillin-tazobactam 4.5 g in sodium chloride 0.9% 100 mL IVPB (ready to mix system) (4.5 g Intravenous New Bag 5/21/22 1258)   vancomycin 750 mg in dextrose 5 % 250 mL IVPB (ready to mix system) (750 mg Intravenous Trough Due As Scheduled Before Dose 5/22/22 0730)   acetaminophen tablet 650 mg (650 mg Oral Given 5/21/22 0504)   HYDROmorphone injection 1 mg (has no administration in time range)   lactated ringers bolus 1,455 mL (0 mL/kg × 48.5 kg Intravenous Stopped 5/20/22 2112)   vancomycin in dextrose 5 % 1 gram/250 mL IVPB 1,000 mg (0 mg Intravenous Stopped 5/20/22 2200)   piperacillin-tazobactam 4.5 g in sodium chloride 0.9% 100 mL IVPB (ready to mix system) (0 g Intravenous Stopped 5/21/22 0015)   ibuprofen 100 mg/5 mL suspension 200 mg (200 mg Oral Given 5/20/22 2200)   sodium chloride 0.9% bolus 500 mL (0 mLs Intravenous Stopped 5/21/22 0645)   sodium chloride 0.9% bolus 1,000 mL (0 mLs Intravenous Stopped 5/21/22 0923)   fentaNYL 50 mcg/mL injection (12.5 mcg Intravenous Given 5/21/22 1152)     Medical Decision Making:   Initial Assessment:   31-year-old female who appears to be in no acute distress presents to the ED complaining of fever and abdominal pain.  Patient is able to converse normally, breath sounds are equal bilaterally and distal pulses are present.  Differential Diagnosis:   Surgical site infection/cellulitis  Pelvic abscess  Necrotizing fasciitis  Urinary tract infection  Pneumonia  ED Management:  Patient met SIRS criteria with triage vitals so I ordered the sepsis workup and initiated fluids.  Following initial evaluation I ordered broad-spectrum antibiotics - vancomycin and Zosyn. CBC shows a leukocytosis at 18.55 and a  slight anemia that is consistent with historical data.  Urinalysis shows trace ketones.  Initial lactate is unremarkable.  X-ray of the tib-fib shows extensive soft tissue air that goes into the distal left thigh.  Chest x-ray shows trace pneumoperitoneum most likely secondary to recent surgery.  Consult to General surgery to evaluate for possible necrotizing fasciitis infection.    Patient is active pending CT have her left lower extremity, CT abdomen pelvis and general surgery evaluation.  Will sign out patient to oncoming provider.            Attending Attestation:   Physician Attestation Statement for Resident:  As the supervising MD   Physician Attestation Statement: I have personally seen and examined this patient.   I agree with the above history. -:   As the supervising MD I agree with the above PE.    As the supervising MD I agree with the above treatment, course, plan, and disposition.        Attending Critical Care:   Critical Care Times:   Direct Patient Care (initial evaluation, reassessments, and time considering the case)................................................................20 minutes.   Additional History from reviewing old medical records or taking additional history from the family, EMS, PCP, etc.......................5 minutes.   Ordering, Reviewing, and Interpreting Diagnostic Studies...............................................................................................................10 minutes.   Documentation..................................................................................................................................................................................5 minutes.   Consultation with other Physicians. .................................................................................................................................................10 minutes.   ==============================================================  · Total Critical Care Time  - exclusive of procedural time: 50 minutes.  ==============================================================  Critical care was necessary to treat or prevent imminent or life-threatening deterioration of the following conditions: sepsis.   Critical care was time spent personally by me on the following activities: obtaining history from patient or relative, examination of patient, review of old charts, ordering lab, x-rays, and/or EKG, development of treatment plan with patient or relative, ordering and performing treatments and interventions, evaluation of patient's response to treatment, discussion with consultants, interpretation of cardiac measurements and re-evaluation of patient's conition.   Critical Care Condition: potentially life-threatening       Attending ED Notes:   31yF post op from robotic hysterectomy, appendectomy and oophorectomy with LN dissection 2/2 malignancy presents with fever, tachycardia, and abdominal pain, found to be tender in lower abdomen and have impressive crepitus in entire LLE and lower abdominal wall. Overlying skin appears normal - no ecchymosis, lesions, excoriations or discoloration; does not appear to be clinically consistent with necrotizing soft tissue infection but given her clinical picture of sepsis, obtained CT abdomen/pelvis and LLE (without contrast 2/2 critical IV contrast shortage and institutional restrictions), which demonstrated an intrabdominal fluid collection and abundant soft tissue gas by my independent interpretation. We've discussed the patient with general surgery given the concern for possible gas forming infection, their recommendations are pending at this time. It's possible the air is 2/2 a misplaced trochar during surgery/insufflation, but would certainly be a diagnosis of exclusion. She's gotten 30cc/kg fluid bolus and broad spectrum antibiotics. Care passed off to Dr. Garibay at change of shift, awaiting recommendations from gen surg and admission.                 Clinical Impression:   Final diagnoses:  [R00.0] Tachycardia  [M79.606] Leg pain  [M79.606] Leg pain - crepitus  [D72.829] Leukocytosis, unspecified type  [R50.9] Acute febrile illness (Primary)  [T81.49XA] Post-operative wound abscess          ED Disposition Condition    Admit               Sandra Burton MD  Resident  05/20/22 9606       Tasia Valle MD  05/21/22 4548

## 2022-05-21 NOTE — PROGRESS NOTES
Pharmacokinetic Initial Assessment: IV Vancomycin    Assessment/Plan:    Initiate intravenous vancomycin with loading dose of 1000 mg once, done in ED, followed by a maintenance dose of vancomycin 750 mg IV every 12 hours.  Desired empiric serum trough concentration is 10 to 20 mcg/mL.  Draw vancomycin trough level 60 min prior to fourth dose on 05/22/2022 at approximately 0730.  Pharmacy will continue to follow and monitor vancomycin.      Please contact pharmacy at extension [phone] with any questions regarding this assessment.     Thank you for the consult,   Alfredito Alcantar       Patient brief summary:  Francis Nagel is a 31 y.o. female initiated on antimicrobial therapy with IV Vancomycin for treatment of suspected intra-abdominal infection.    Drug Allergies:   Review of patient's allergies indicates:  No Known Allergies    Actual Body Weight:   48.5 kg    Renal Function:   Estimated Creatinine Clearance: 101.6 mL/min (based on SCr of 0.6 mg/dL).    CBC (last 72 hours):  Recent Labs   Lab Result Units 05/20/22 1956   WBC K/uL 18.55*   Hemoglobin g/dL 11.3*   Hematocrit % 33.4*   Platelets K/uL 329   Gran % % 82.0*   Lymph % % 8.0*   Mono % % 8.4   Eosinophil % % 0.5   Basophil % % 0.1   Differential Method  Automated       Metabolic Panel (last 72 hours):  Recent Labs   Lab Result Units 05/20/22 1956 05/20/22 2049   Sodium mmol/L 133*  --    Potassium mmol/L 3.8  --    Chloride mmol/L 98  --    CO2 mmol/L 24  --    Glucose mg/dL 118*  --    Glucose, UA   --  Negative   BUN mg/dL 5*  --    Creatinine mg/dL 0.6  --    Albumin g/dL 3.1*  --    Total Bilirubin mg/dL 0.8  --    Alkaline Phosphatase U/L 92  --    AST U/L 47*  --    ALT U/L 42  --        Drug levels (last 3 results):  No results for input(s): VANCOMYCINRA, VANCOMYCINPE, VANCOMYCINTR in the last 72 hours.    Microbiologic Results:  Microbiology Results (last 7 days)     Procedure Component Value Units Date/Time    Blood culture x two cultures.  Draw prior to antibiotics. [[id number]] Collected: 05/20/22 2002    Order Status: Sent Specimen: Blood from Peripheral, Antecubital, Left Updated: 05/20/22 2003    Blood culture x two cultures. Draw prior to antibiotics. [[id number]] Collected: 05/20/22 2002    Order Status: Sent Specimen: Blood from Peripheral, Hand, Left Updated: 05/20/22 2003

## 2022-05-21 NOTE — SUBJECTIVE & OBJECTIVE
No current facility-administered medications on file prior to encounter.     Current Outpatient Medications on File Prior to Encounter   Medication Sig    acetaminophen (TYLENOL) 650 MG TbSR Take 1 tablet (650 mg total) by mouth every 6 to 8 hours as needed (Pain). Alternate every 3 hours with ibuprofen.    fexofenadine (ALLEGRA) 180 MG tablet Take 180 mg by mouth once daily.    HYDROcodone-acetaminophen (NORCO) 5-325 mg per tablet Take 1 tablet by mouth every 6 (six) hours as needed for Pain.    ibuprofen (ADVIL,MOTRIN) 600 MG tablet Take 1 tablet (600 mg total) by mouth 3 (three) times daily.    ondansetron (ZOFRAN-ODT) 4 MG TbDL Take 1 tablet (4 mg total) by mouth every 6 (six) hours as needed (nausea).       Review of patient's allergies indicates:  No Known Allergies    Past Medical History:   Diagnosis Date    Anemia 03/11/2022    Encounter for blood transfusion 03/11/2022    Mass of left ovary 03/10/2022    JOSE MIGUEL.     LEFT OVARY SHOWS ADENOCARCINOMA, INTESTINAL PHENOTYPE MEASURING 15 CM    Metastatic cancer 4/11/2022    PONV (postoperative nausea and vomiting)     S/P unilateral salpingo-oophorectomy/mass resection 03/10/2022     Past Surgical History:   Procedure Laterality Date    COLONOSCOPY N/A 4/11/2022    Procedure: COLONOSCOPY;  Surgeon: Eric Meléndez Jr., MD;  Location: [facility];  Service: Endoscopy;  Laterality: N/A;    ESOPHAGOGASTRODUODENOSCOPY N/A 4/11/2022    Procedure: EGD (ESOPHAGOGASTRODUODENOSCOPY);  Surgeon: Eric Meléndez Jr., MD;  Location: [facility];  Service: Endoscopy;  Laterality: N/A;    ROBOT-ASSISTED APPENDECTOMY  5/13/2022    Procedure: ROBOTIC APPENDECTOMY;  Surgeon: Charmaine Delacruz MD;  Location: [facility];  Service: OB/GYN;;    ROBOT-ASSISTED LAPAROSCOPIC ABDOMINAL HYSTERECTOMY USING DA DARVIN XI N/A 5/13/2022    Procedure: XI ROBOTIC HYSTERECTOMY;  Surgeon: Charmaine Delacruz MD;  Location: [facility];  Service: OB/GYN;  Laterality: N/A;    ROBOT-ASSISTED LAPAROSCOPIC OMENTECTOMY  USING DA DARVIN XI N/A 5/13/2022    Procedure: XI ROBOTIC OMENTECTOMY;  Surgeon: Charmaine Delacruz MD;  Location: [facility];  Service: OB/GYN;  Laterality: N/A;    ROBOT-ASSISTED LAPAROSCOPIC SALPINGO-OOPHORECTOMY USING DA DARVIN XI Left 3/10/2022    Procedure: XI ROBOTIC SALPINGO-OOPHORECTOMY/ USO;  Surgeon: Charmaine Delacruz MD;  Location: [address];  Service: OB/GYN;  Laterality: Left;    ROBOT-ASSISTED LAPAROSCOPIC SURGICAL REMOVAL OF OVARY USING DA DARVIN XI Right 5/13/2022    Procedure: XI ROBOTIC OOPHORECTOMY;  Surgeon: Charmaine Delacruz MD;  Location: [facility];  Service: OB/GYN;  Laterality: Right;    ROBOT-ASSISTED SURGICAL REMOVAL OF FALLOPIAN TUBE USING DA DARVIN XI Bilateral 5/13/2022    Procedure: XI ROBOTIC SALPINGECTOMY;  Surgeon: Charmaine Delacruz MD;  Location: [facility];  Service: OB/GYN;  Laterality: Bilateral;     Family History    None       Tobacco Use    Smoking status: Never Smoker    Smokeless tobacco: Never Used   Substance and Sexual Activity    Alcohol use: Yes     Comment: twice a month    Drug use: Never    Sexual activity: Yes     Review of Systems   Constitutional:  Positive for chills and fever.   HENT:  Negative for sore throat.    Eyes:  Negative for redness.   Respiratory:  Negative for shortness of breath.    Cardiovascular:  Negative for chest pain.   Gastrointestinal:  Positive for abdominal pain and nausea.   Endocrine: Negative for polyuria.   Genitourinary:  Positive for dysuria.   Musculoskeletal:  Negative for back pain.   Skin:  Negative for wound.   Neurological:  Positive for headaches.   Psychiatric/Behavioral:  Negative for agitation.    Objective:     Vital Signs (Most Recent):  Temp: (!) 101.1 °F (38.4 °C) (05/20/22 2150)  Pulse: 105 (05/20/22 2228)  Resp: 17 (05/20/22 2228)  BP: 112/74 (05/20/22 2228)  SpO2: 99 % (05/20/22 2228)   Vital Signs (24h Range):  Temp:  [99.7 °F (37.6 °C)-101.1 °F (38.4 °C)] 101.1 °F (38.4 °C)  Pulse:  [105-117] 105  Resp:  [17-18] 17  SpO2:   [97 %-99 %] 99 %  BP: ()/(61-74) 112/74     Weight: 48.5 kg (107 lb)  Body mass index is 20.22 kg/m².    Physical Exam  Vitals and nursing note reviewed.   Constitutional:       General: She is not in acute distress.     Appearance: She is well-developed.   HENT:      Head: Normocephalic and atraumatic.      Right Ear: External ear normal.      Left Ear: External ear normal.      Mouth/Throat:      Mouth: Mucous membranes are moist.   Eyes:      Conjunctiva/sclera: Conjunctivae normal.   Cardiovascular:      Rate and Rhythm: Normal rate.   Pulmonary:      Effort: Pulmonary effort is normal.   Abdominal:      Palpations: Abdomen is soft.      Tenderness: There is abdominal tenderness (moderate suprapubic tenderness. Incisions clean, dry, and intact.).      Comments: Incisions clean, dry, and intact.    Musculoskeletal:      Cervical back: Normal range of motion.      Comments: Some crepitus lower abdominal wall and down left leg. No skin changes. No tenderness.    Skin:     General: Skin is warm and dry.   Neurological:      Mental Status: She is alert.   Psychiatric:         Behavior: Behavior normal.       Significant Labs:  I have reviewed all pertinent lab results within the past 24 hours.  CBC:   Recent Labs   Lab 05/20/22 1956   WBC 18.55*   RBC 3.56*   HGB 11.3*   HCT 33.4*      MCV 94   MCH 31.7*   MCHC 33.8     BMP:   Recent Labs   Lab 05/20/22 1956   *   *   K 3.8   CL 98   CO2 24   BUN 5*   CREATININE 0.6   CALCIUM 9.4       Significant Diagnostics:  CT with subcutaneous air along abdominal wall and left leg. Pelvis has two moderate sized fluid collections.

## 2022-05-21 NOTE — CONSULTS
[facility] - Emergency Dept  General Surgery  Consult Note    Patient Name: Francis Nagel  MRN: [id number]  Code Status: Prior  Admission Date: 5/20/2022  Hospital Length of Stay: 0 days  Attending Physician: Tasia Valle MD  Primary Care Provider: Primary Doctor No    Patient information was obtained from patient, past medical records, ER records and primary team.     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 She presents to the ED today with severe lower abdominal pain associated with fevers and chills as well as nausea, dysuria, and headaches. She came to the ED for leg pain POD 1 and was noted to have extensive crepitus and subcutaneous air, likely from malpositioned trocar placement and insufflation. She had a CT done today that showed the same as well as what appears to be two pelvic fluid collections. Surgery consulted for possible necrotizing fasciitis. Patient endorses no leg pain or skin changes of any kind.       No current facility-administered medications on file prior to encounter.     Current Outpatient Medications on File Prior to Encounter   Medication Sig    acetaminophen (TYLENOL) 650 MG TbSR Take 1 tablet (650 mg total) by mouth every 6 to 8 hours as needed (Pain). Alternate every 3 hours with ibuprofen.    fexofenadine (ALLEGRA) 180 MG tablet Take 180 mg by mouth once daily.    HYDROcodone-acetaminophen (NORCO) 5-325 mg per tablet Take 1 tablet by mouth every 6 (six) hours as needed for Pain.    ibuprofen (ADVIL,MOTRIN) 600 MG tablet Take 1 tablet (600 mg total) by mouth 3 (three) times daily.    ondansetron (ZOFRAN-ODT) 4 MG TbDL Take 1 tablet (4 mg total) by mouth every 6 (six) hours as needed  (nausea).       Review of patient's allergies indicates:  No Known Allergies    Past Medical History:   Diagnosis Date    Anemia 03/11/2022    Encounter for blood transfusion 03/11/2022    Mass of left ovary 03/10/2022    JOSE MIGUEL.     LEFT OVARY SHOWS ADENOCARCINOMA, INTESTINAL PHENOTYPE MEASURING 15 CM    Metastatic cancer 4/11/2022    PONV (postoperative nausea and vomiting)     S/P unilateral salpingo-oophorectomy/mass resection 03/10/2022     Past Surgical History:   Procedure Laterality Date    COLONOSCOPY N/A 4/11/2022    Procedure: COLONOSCOPY;  Surgeon: Eric Meléndez Jr., MD;  Location: [facility];  Service: Endoscopy;  Laterality: N/A;    ESOPHAGOGASTRODUODENOSCOPY N/A 4/11/2022    Procedure: EGD (ESOPHAGOGASTRODUODENOSCOPY);  Surgeon: Eric Meléndez Jr., MD;  Location: [facility];  Service: Endoscopy;  Laterality: N/A;    ROBOT-ASSISTED APPENDECTOMY  5/13/2022    Procedure: ROBOTIC APPENDECTOMY;  Surgeon: Charmaine Delacruz MD;  Location: [facility];  Service: OB/GYN;;    ROBOT-ASSISTED LAPAROSCOPIC ABDOMINAL HYSTERECTOMY USING DA DARVIN XI N/A 5/13/2022    Procedure: XI ROBOTIC HYSTERECTOMY;  Surgeon: Charmaine Delacruz MD;  Location: [facility];  Service: OB/GYN;  Laterality: N/A;    ROBOT-ASSISTED LAPAROSCOPIC OMENTECTOMY USING DA DARVIN XI N/A 5/13/2022    Procedure: XI ROBOTIC OMENTECTOMY;  Surgeon: Charmaine Delacruz MD;  Location: [facility];  Service: OB/GYN;  Laterality: N/A;    ROBOT-ASSISTED LAPAROSCOPIC SALPINGO-OOPHORECTOMY USING DA DARVIN XI Left 3/10/2022    Procedure: XI ROBOTIC SALPINGO-OOPHORECTOMY/ USO;  Surgeon: Charmaine Delacruz MD;  Location: [address];  Service: OB/GYN;  Laterality: Left;    ROBOT-ASSISTED LAPAROSCOPIC SURGICAL REMOVAL OF OVARY USING DA DARVIN XI Right 5/13/2022    Procedure: XI ROBOTIC OOPHORECTOMY;  Surgeon: Charmaine Delacruz MD;  Location: [facility];  Service: OB/GYN;  Laterality: Right;    ROBOT-ASSISTED SURGICAL REMOVAL OF FALLOPIAN TUBE USING DA DARVIN XI Bilateral  5/13/2022    Procedure: XI ROBOTIC SALPINGECTOMY;  Surgeon: Charmaine Delacruz MD;  Location: [facility];  Service: OB/GYN;  Laterality: Bilateral;     Family History    None       Tobacco Use    Smoking status: Never Smoker    Smokeless tobacco: Never Used   Substance and Sexual Activity    Alcohol use: Yes     Comment: twice a month    Drug use: Never    Sexual activity: Yes     Review of Systems   Constitutional:  Positive for chills and fever.   HENT:  Negative for sore throat.    Eyes:  Negative for redness.   Respiratory:  Negative for shortness of breath.    Cardiovascular:  Negative for chest pain.   Gastrointestinal:  Positive for abdominal pain and nausea.   Endocrine: Negative for polyuria.   Genitourinary:  Positive for dysuria.   Musculoskeletal:  Negative for back pain.   Skin:  Negative for wound.   Neurological:  Positive for headaches.   Psychiatric/Behavioral:  Negative for agitation.    Objective:     Vital Signs (Most Recent):  Temp: (!) 101.1 °F (38.4 °C) (05/20/22 2150)  Pulse: 105 (05/20/22 2228)  Resp: 17 (05/20/22 2228)  BP: 112/74 (05/20/22 2228)  SpO2: 99 % (05/20/22 2228)   Vital Signs (24h Range):  Temp:  [99.7 °F (37.6 °C)-101.1 °F (38.4 °C)] 101.1 °F (38.4 °C)  Pulse:  [105-117] 105  Resp:  [17-18] 17  SpO2:  [97 %-99 %] 99 %  BP: ()/(61-74) 112/74     Weight: 48.5 kg (107 lb)  Body mass index is 20.22 kg/m².    Physical Exam  Vitals and nursing note reviewed.   Constitutional:       General: She is not in acute distress.     Appearance: She is well-developed.   HENT:      Head: Normocephalic and atraumatic.      Right Ear: External ear normal.      Left Ear: External ear normal.      Mouth/Throat:      Mouth: Mucous membranes are moist.   Eyes:      Conjunctiva/sclera: Conjunctivae normal.   Cardiovascular:      Rate and Rhythm: Normal rate.   Pulmonary:      Effort: Pulmonary effort is normal.   Abdominal:      Palpations: Abdomen is soft.      Tenderness: There is abdominal  tenderness (moderate suprapubic tenderness. Incisions clean, dry, and intact.).      Comments: Incisions clean, dry, and intact.    Musculoskeletal:      Cervical back: Normal range of motion.      Comments: Some crepitus lower abdominal wall and down left leg. No skin changes. No tenderness.    Skin:     General: Skin is warm and dry.   Neurological:      Mental Status: She is alert.   Psychiatric:         Behavior: Behavior normal.       Significant Labs:  I have reviewed all pertinent lab results within the past 24 hours.  CBC:   Recent Labs   Lab 05/20/22 1956   WBC 18.55*   RBC 3.56*   HGB 11.3*   HCT 33.4*      MCV 94   MCH 31.7*   MCHC 33.8     BMP:   Recent Labs   Lab 05/20/22 1956   *   *   K 3.8   CL 98   CO2 24   BUN 5*   CREATININE 0.6   CALCIUM 9.4       Significant Diagnostics:  CT with subcutaneous air along abdominal wall and left leg. Pelvis has two moderate sized fluid collections.       Assessment/Plan:     S/P unilateral salpingo-oophorectomy/mass resection  31-year-old female s/p TLH/SPO with lymphadenectomy, omentectomy, and appendectomy now with abdominal pain found to have pelvic fluid collections. Also has subcutaneous air along abdominal wall and leg. Consulted for rule out necrotizing fasciitis. Believe this is iatrogenic from malpositioned trocar and insufflation during case. Does not appear to be necrotizing fasciitis clinically. As far as her septic clinic picture, believe this can be explained by pelvic fluid collections.     -recommend reaching out to gyn onc for evaluation and possible admission for IV abx and source control  -no indications for surgical debridement of subcutaneous air.   -surgery will sign off.       VTE Risk Mitigation (From admission, onward)    None          Thank you for your consult. I will sign off.     Joey Torres MD  General Surgery  [facility] - Emergency Dept

## 2022-05-21 NOTE — ASSESSMENT & PLAN NOTE
- Presented on 5/20 on POD 7 s/p RA-TLH/RSO/pelvic and para aortic LND/omentectomy/peritoneal biopsy/appendectomy with fevers and abdominal pain  - Febrile to 101.1 in ED with leukocytosis of 18 and elevated CRP  - CT findings: New multiple fluid collections in the pelvis.  Retro vesicular fluid collection measures 6.6 x 5.8 x 6.3 cm.  This collection abuts the sigmoid colon.  Right pelvic sidewall collection measures 4.3 x 2.5 x 4.4 cm.  This collection abuts the right iliac/femoral vessels.  - Vaginal cuff intact on pelvic exam with no evidence of cuff abscess or dehiscence   - NPO for potential IR drainage of pelvic fluid collections.   - Broad spectrum abx vanc/zosyn continued on admit.   - Blood cultures and urine cultures pending  - CXR with no acute process  - IVF  - AM CBC and BMP  - Subcutaneous air noted in left lower extremity on CT and xray with crepitus palpable on exam- evaluated by general surgery and no evidence of necrotizing fascitis.

## 2022-05-21 NOTE — PLAN OF CARE
R pelvic abscess drain placement complete. Pt tolerated well. VSS. No signs or symptoms of distress noted.  90mL abscess fluid removed.  Pt will be transferred back to 854. Report given to andrea Gonzales RN.

## 2022-05-21 NOTE — CONSULTS
Vascular and Interventional Radiology   Consult History & Physical      Date: 5/21/2022   Primary team: Networked reference to record PCT , Charmaine Delacruz MD   Room/bed: 854/854 A    Inpatient consult to Interventional Radiology  Consult performed by: Michael Hu MD  Consult ordered by: Roberta Kaiser MD        Reason for Consult:   Pelvic abscesses    History of Present Illness:  Francis Nagel is a 31 y.o. 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  She presented overnight with fevers, chills and abdominal pain and was found to have multiple pelvic fluid abscesses with the largest collections being retrovesicular measuring 6.6 x 5.8 x 6.3 cm and in the right pelvic sidewall measuring 4.3 x 2.5 x 4.4 cm.  IR was consulted for drainage of her pelvic abscesses.    Past Medical History:  Past Medical History:   Diagnosis Date    Anemia 03/11/2022    Encounter for blood transfusion 03/11/2022    Mass of left ovary 03/10/2022    JOSE MIGUEL.     LEFT OVARY SHOWS ADENOCARCINOMA, INTESTINAL PHENOTYPE MEASURING 15 CM    Metastatic cancer 4/11/2022    PONV (postoperative nausea and vomiting)     S/P unilateral salpingo-oophorectomy/mass resection 03/10/2022       Past Surgical History:  Past Surgical History:   Procedure Laterality Date    COLONOSCOPY N/A 4/11/2022    Procedure: COLONOSCOPY;  Surgeon: Eric Meléndez Jr., MD;  Location: [facility];  Service: Endoscopy;  Laterality: N/A;    ESOPHAGOGASTRODUODENOSCOPY N/A 4/11/2022    Procedure: EGD (ESOPHAGOGASTRODUODENOSCOPY);  Surgeon: Eric Meléndez Jr., MD;  Location: [facility] ENDO;  Service: Endoscopy;  Laterality: N/A;    ROBOT-ASSISTED APPENDECTOMY  5/13/2022    Procedure: ROBOTIC APPENDECTOMY;  Surgeon: Charmaine Delacruz MD;  Location: [facility], [place], operated by [facility] OR;  Service: OB/GYN;;    ROBOT-ASSISTED LAPAROSCOPIC ABDOMINAL HYSTERECTOMY USING DA DARVIN XI N/A  5/13/2022    Procedure: XI ROBOTIC HYSTERECTOMY;  Surgeon: Charmaine Delacruz MD;  Location: [facility];  Service: OB/GYN;  Laterality: N/A;    ROBOT-ASSISTED LAPAROSCOPIC OMENTECTOMY USING DA DARVIN XI N/A 5/13/2022    Procedure: XI ROBOTIC OMENTECTOMY;  Surgeon: Charmaine Delacruz MD;  Location: [facility];  Service: OB/GYN;  Laterality: N/A;    ROBOT-ASSISTED LAPAROSCOPIC SALPINGO-OOPHORECTOMY USING DA DARVIN XI Left 3/10/2022    Procedure: XI ROBOTIC SALPINGO-OOPHORECTOMY/ USO;  Surgeon: Charmaine Delacruz MD;  Location: [address];  Service: OB/GYN;  Laterality: Left;    ROBOT-ASSISTED LAPAROSCOPIC SURGICAL REMOVAL OF OVARY USING DA DARVIN XI Right 5/13/2022    Procedure: XI ROBOTIC OOPHORECTOMY;  Surgeon: Charmaine Delacruz MD;  Location: [facility];  Service: OB/GYN;  Laterality: Right;    ROBOT-ASSISTED SURGICAL REMOVAL OF FALLOPIAN TUBE USING DA DARVIN XI Bilateral 5/13/2022    Procedure: XI ROBOTIC SALPINGECTOMY;  Surgeon: Charmaine Delacruz MD;  Location: [facility];  Service: OB/GYN;  Laterality: Bilateral;        Sedation History:    No known adverse reactions.     Social History:  Social History     Tobacco Use    Smoking status: Never Smoker    Smokeless tobacco: Never Used   Substance Use Topics    Alcohol use: Yes     Comment: twice a month    Drug use: Never        Home Medications:   Prior to Admission medications    Medication Sig Start Date End Date Taking? Authorizing Provider   acetaminophen (TYLENOL) 650 MG TbSR Take 1 tablet (650 mg total) by mouth every 6 to 8 hours as needed (Pain). Alternate every 3 hours with ibuprofen. 3/10/22   Alfred Kent MD   fexofenadine (ALLEGRA) 180 MG tablet Take 180 mg by mouth once daily.    Historical Provider   HYDROcodone-acetaminophen (NORCO) 5-325 mg per tablet Take 1 tablet by mouth every 6 (six) hours as needed for Pain. 5/13/22   Liz Mitchell MD   ibuprofen (ADVIL,MOTRIN) 600 MG tablet Take 1 tablet (600 mg total) by mouth 3 (three) times daily.  5/13/22   Liz Mitchell MD   ondansetron (ZOFRAN-ODT) 4 MG TbDL Take 1 tablet (4 mg total) by mouth every 6 (six) hours as needed (nausea). 5/14/22   Gabrielle Monet MD       Inpatient Medications:    Current Facility-Administered Medications:     0.9%  NaCl infusion, , Intravenous, Continuous, Roberta Kaiser MD, Last Rate: 125 mL/hr at 05/21/22 0300, New Bag at 05/21/22 0300    acetaminophen tablet 650 mg, 650 mg, Oral, Q6H PRN, Roberta Kaiser MD, 650 mg at 05/21/22 0504    ibuprofen tablet 600 mg, 600 mg, Oral, Q6H PRN, Roberta Kaiser MD, 600 mg at 05/21/22 0207    ondansetron disintegrating tablet 8 mg, 8 mg, Oral, Q8H PRN, Roberta Kaiser MD    oxyCODONE immediate release tablet 5 mg, 5 mg, Oral, Q4H PRN, Roberta Kaiser MD    oxyCODONE immediate release tablet Tab 10 mg, 10 mg, Oral, Q4H PRN, Roberta Kaiser MD    piperacillin-tazobactam 4.5 g in sodium chloride 0.9% 100 mL IVPB (ready to mix system), 4.5 g, Intravenous, Q8H, Roberta Kaiser MD, Stopped at 05/21/22 0817    prochlorperazine injection Soln 5 mg, 5 mg, Intravenous, Q6H PRN, Roberta Kaiser MD    sodium chloride 0.9% flush 10 mL, 10 mL, Intravenous, PRN, Roberta Kaiser MD    Pharmacy to dose Vancomycin consult, , , Once **AND** vancomycin - pharmacy to dose, , Intravenous, pharmacy to manage frequency, Roberta Kaiser MD    vancomycin 750 mg in dextrose 5 % 250 mL IVPB (ready to mix system), 15 mg/kg, Intravenous, Q12H, Roberta Kaiser MD, Last Rate: 250 mL/hr at 05/21/22 0946, 750 mg at 05/21/22 0946     Anticoagulants/Antiplatelets:   No anticoagulation    Allergies:   Review of patient's allergies indicates:  No Known Allergies    Review of Systems:   As documented in primary provider H&P.    Vital Signs:  Temp: 96.3 °F (35.7 °C) (05/21/22 0728)  Pulse: 96 (05/21/22 0728)  Resp: 14 (05/21/22 0728)  BP: (!) 87/56 (05/21/22 0700)  SpO2: 96 % (05/21/22 0728)    Temp:  [96.3 °F (35.7 °C)-101.1 °F (38.4 °C)]   Pulse:   []   Resp:  [14-20]   BP: ()/(53-74)   SpO2:  [96 %-100 %]      Physical Exam:  No acute distress, laying comfortably in bed, pleasant and cooperative  Regular rate and rhythm  Breathing unlabored  Abdomen tender to palpation  Extremities warm and well perfused    Sedation Exam:  ASA: III - Patient appears to have severe systemic disease not posing a constant threat to life  Mallampati score: I (soft palate, uvula, fauces, and tonsillar pillars visible)    Laboratory:  Lab Results   Component Value Date    INR 1.0 05/14/2022       Lab Results   Component Value Date    WBC 16.17 (H) 05/21/2022    HGB 8.4 (L) 05/21/2022    HCT 25.4 (L) 05/21/2022    MCV 95 05/21/2022     05/21/2022      Lab Results   Component Value Date     (H) 05/21/2022     05/21/2022    K 3.5 05/21/2022     05/21/2022    CO2 24 05/21/2022    BUN 4 (L) 05/21/2022    CREATININE 0.5 05/21/2022    CALCIUM 8.2 (L) 05/21/2022    ALT 42 05/20/2022    AST 47 (H) 05/20/2022    ALBUMIN 3.1 (L) 05/20/2022    BILITOT 0.8 05/20/2022       Imaging:   Reviewed.      ASSESSMENT/PLAN/RECOMMENDATIONS:   31F POD8 s/p robotic-assisted hysterectomy who presents with multiple pelvic abscesses.    Sedation Plan: Up to moderate  Patient will undergo: Image-guided drainage of pelvic abscess(es)    Michael Hu M.D.  Resident, Diagnostic and Interventional Radiology  Department of Radiology  [facility]

## 2022-05-21 NOTE — PLAN OF CARE
Pt admitted from the ER @ 0145 for c/o abdominal pain and fever. Pt alert and oriented x4. 96% saturation on room air. Abdomen distended, tender to touch, bowel sounds present. Approximated abdominal incisions noted.  Pt said she had a BM yesterday. Ambulatory with assist. Medicated with Motrin 600mg for c/o lower abdominal pain. Pain med was effective. Pt hypotensive this morning, BP 92/57, MAP 69,  and febrile, temp 100.4. Provider made aware, Tylenol PO and NS 500ml bolus ordered and given. Temp now 98.3, BP 88/53, MAP 65, HR 99. On call Provider notified. Pt denies pain at this time. IV ABT in progress. Voiding moderate pankaj colored urine. Safety precaution in place. Spouse at bedside. Continue to monitor.

## 2022-05-21 NOTE — ED NOTES
Admission Dx: Post op abscess  POD#8 s/p s/p RA-TLH/RSO/LND/OMX/peritoneal bx/appy  PMH: Suspected Krukenberg tumor  Meds: vanc/zosyn, IVF, tylenol/ibuprofen, oxy IR, dilaudid PRN   Diet: NPO  Labs:  18/11/33/329>16/8.4/25.4/259    Cr 0.5     LA wnl      Imaging: pelvic fluid collections: 6.6 x 5.8 x 6.3 cm and  4.3 x 2.5 x 4.4 cm. Soft tissue air (thighs)  Tmax 101.1 2150, Tlast 100.5 @ 0500     BP: ()/(53-74) 87/56    Hr 90s      UOP 1cc/kg/hr   F/U:  [ ] BP     [ ] Urine/blood Cx    [ ] IR?

## 2022-05-21 NOTE — ED NOTES
Adult Physical Assessment  LOC: Francis Nagel, 31 y.o. female verified via two identifiers.  The patient is awake, alert, oriented and speaking appropriately at this time.  APPEARANCE: Patient resting comfortably and appears to be in no acute distress at this time. Patient is clean and well groomed, patient's clothing is properly fastened.  SKIN:The skin is warm and dry, color consistent with ethnicity, patient has normal skin turgor and moist mucus membranes, skin intact, no breakdown or brusing noted.  MUSCULOSKELETAL: Patient moving all extremities well, no obvious swelling or deformities noted.  RESPIRATORY: Airway is open and patent, respirations are spontaneous, patient has a normal effort and rate, no accessory muscle use noted.  CARDIAC: Patient has a normal rate and rhythm, no periphreal edema noted in any extremity, capillary refill < 3 seconds in all extremities  ABDOMEN:  tender to palpation in RLQ & LLQ, surgical incision site noted, dressing is clean, dry, and intact, no abdominal distention noted. Bowel sounds present in all four quadrants.  NEUROLOGIC: Eyes open spontaneously, behavior appropriate to situation, follows commands, facial expression symmetrical, bilateral hand grasp equal and even, purposeful motor response noted, normal sensation in all extremities when touched with a finger.

## 2022-05-21 NOTE — ASSESSMENT & PLAN NOTE
31-year-old female s/p TLH/SPO with lymphadenectomy, omentectomy, and appendectomy now with abdominal pain found to have pelvic fluid collections. Also has subcutaneous air along abdominal wall and leg. Consulted for rule out necrotizing fasciitis. Believe this is iatrogenic from malpositioned trocar and insufflation during case. Does not appear to be necrotizing fasciitis clinically. As far as her septic clinic picture, believe this can be explained by pelvic fluid collections.     -recommend reaching out to gyn onc for evaluation and possible admission for IV abx and source control  -no indications for surgical debridement of subcutaneous air.   -surgery will sign off.

## 2022-05-21 NOTE — H&P
[facility] - Emergency Dept  Gynecologic Oncology  H&P    Patient Name: Francis Nagel  MRN: [id number]  Admission Date: 5/20/2022  Primary Care Provider: Primary Doctor No   Principal Problem: Post op infection    Subjective:     Chief Complaint/Reason for Admission: Post operative infection    History of Present Illness:  Francis Nagel is a 31 y.o. 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 She presented on POD 1 to the ED with lower extremity swelling and pain and was noted to have extensive crepitus and subcutaneous air, likely from malpositioned trocar placement and insufflation.  She presents today with fevers and chills with a temp of 101 at home as well as moderate abdominal pain partially improved with norco and ibuprofen. She denies nausea/vomiting. She denies dysuria. Reports last BM a few hours ago. Denies foul smelling vaginal discharge or significant vaginal bleeding. She is ambulating without difficulty. She reports her left lower extremity is no longer painful.       Hospital Course:  05/21/2022- POD8 s/p RA-TLH/RSO/pelvic and para aortic LND/omentectomy/peritoneal biopsy/appendectomy admitted for post operative pelvic abscess. Febrile to 101.1 in ED. Multiple small fluid collections in the pelvis largest measuring 6.6 x 5.8 x 6.3 cm. NPO for potential IR drainage of pelvic fluid collections. Broad spectrum abx vanc/zosyn continued on admit. Subcutaneous air noted in left lower extremity- evaluated by general surgery and no evidence of necrotizing fascitis.         Scheduled Meds:   piperacillin-tazobactam (ZOSYN) IVPB  4.5 g Intravenous Q8H    vancomycin (VANCOCIN) IVPB  15 mg/kg Intravenous Q12H     Continuous Infusions:   sodium chloride 0.9%       PRN Meds:ibuprofen, ondansetron, oxyCODONE, oxyCODONE, prochlorperazine, sodium chloride 0.9%, Pharmacy to  dose Vancomycin consult **AND** vancomycin - pharmacy to dose    Review of patient's allergies indicates:  No Known Allergies    Objective:     Vital Signs (Most Recent):  Temp: 98.9 °F (37.2 °C) (05/21/22 0014)  Pulse: 105 (05/21/22 0014)  Resp: 17 (05/21/22 0014)  BP: 117/70 (05/21/22 0014)  SpO2: 99 % (05/21/22 0014) Vital Signs (24h Range):  Temp:  [98.9 °F (37.2 °C)-101.1 °F (38.4 °C)] 98.9 °F (37.2 °C)  Pulse:  [103-117] 105  Resp:  [17-18] 17  SpO2:  [97 %-99 %] 99 %  BP: ()/(61-74) 117/70     Weight: 48.5 kg (107 lb)  Body mass index is 20.22 kg/m².    Intake/Output - Last 3 Shifts       None               Physical Exam:   Constitutional: She is oriented to person, place, and time. She appears well-developed and well-nourished.    HENT:   Head: Normocephalic.    Eyes: EOM are normal.     Cardiovascular:  Normal rate.             Pulmonary/Chest: Effort normal.        Abdominal: Soft. She exhibits abdominal incision (Laparoscopic incisions c/d/i). She exhibits no distension. There is abdominal tenderness (Mild diffuse TTP). There is no rebound and no guarding.     Genitourinary:    Genitourinary Comments: Vaginal cuff appears intact on speculum exam without evidence of dehiscence or abscess. No purulent vaginal discharge or vaginal bleeding. Bimanual exam benign and entire cuff palpated gently with no defects noted             Musculoskeletal: Moves all extremeties.       Neurological: She is alert and oriented to person, place, and time.    Skin: Skin is warm and dry.    Psychiatric: She has a normal mood and affect. Her behavior is normal.     Lines/Drains/Airways       Peripheral Intravenous Line  Duration                  Peripheral IV - Single Lumen 05/20/22 1920 20 G Left Antecubital <1 day                    Laboratory:  CBC:   Recent Labs   Lab 05/20/22 1956   WBC 18.55*   HGB 11.3*   HCT 33.4*      , CMP:   Recent Labs   Lab 05/20/22  1956   *   K 3.8   CL 98   CO2 24   *    BUN 5*   CREATININE 0.6   CALCIUM 9.4   PROT 8.6*   ALBUMIN 3.1*   BILITOT 0.8   ALKPHOS 92   AST 47*   ALT 42   ANIONGAP 11   EGFRNONAA >60.0   , Urine Studies:   Recent Labs   Lab 05/20/22 2049   COLORU Yellow   APPEARANCEUA Clear   PHUR 6.0   SPECGRAV 1.010   PROTEINUA Negative   GLUCUA Negative   KETONESU Trace*   BILIRUBINUA Negative   OCCULTUA 1+*   NITRITE Negative   LEUKOCYTESUR Negative   RBCUA 1   WBCUA 3   BACTERIA Occasional   SQUAMEPITHEL 4   , and All pertinent labs from the last 24 hours have been reviewed.    Diagnostic Results:  CT ABDOMEN PELVIS WITHOUT CONTRAST     CLINICAL HISTORY:  Abdominal abscess/infection suspected;     TECHNIQUE:  Routine axial CT images of the abdomen and pelvis were obtained without contrast.  .  Coronal and Sagittal reformatted images were also obtained.     COMPARISON:  No convincing PET-CT 04/21/2022; chest radiograph 05/20/2022     FINDINGS:  The lung bases are unremarkable.  There is no pleural fluid present.  The visualized portions of the heart appear normal.     The liver is mildly enlarged measuring 18 cm with no focal hepatic abnormality.  The gallbladder shows no evidence of stones or pericholecystic fluid.  There is no intra-or extrahepatic biliary ductal dilatation.     The spleen is borderline mildly enlarged.  The stomach, pancreas, and adrenal glands are unremarkable.     The kidneys are normal in size and location.  There is no evidence of hydronephrosis.     The abdominal aorta is normal in course and caliber without significant atherosclerotic calcifications.     The visualized loops of small and large bowel show no evidence of obstruction or inflammation.     The osseous structures are intact without suspicious osseous lesions.     Postoperative changes of left salpingo-oophorectomy for reported neoplasm.     Interval changes of recent hysterectomy with right salpingo-oophorectomy, appendectomy, omentectomy and bilateral pelvic and periaortic lymph  node dissection.     New multiple fluid collections in the pelvis.  Retro vesicular fluid collection measures 6.6 x 5.8 x 6.3 cm.  This collection abuts the sigmoid colon.  Right pelvic sidewall collection measures 4.3 x 2.5 x 4.4 cm.  This collection abuts the right iliac/femoral vessels.     Generalized stranding edema of the pelvis and mesentery to a lesser extent.  Trace free fluid.     There is circumferential thickening of the urinary bladder allowing for nondistention.     There is scattered extraperitoneal small gas foci along the periphery of the pelvis with small foci extending along the lateral aspect of the pericolonic gutter.  There are small scattered gas foci along the retroperitoneal posterior aspect of the psoas soft tissues.  Difficult to exclude trace peripheral intraperitoneal free air.     There is extensive subcutaneous emphysema about the body wall anteriorly and laterally.  Several gas foci extend along the bilateral inguinal areas in proximal thighs predominant on the left with extension into the inter fascial tissues of the left lower extremity inferiorly out of view.  Please refer to CT of the left thigh for further evaluation.     Impression:     Interval operative changes of hysterectomy, right salpingo-oophorectomy, reported omentectomy, and bilateral pelvic/periaortic lymph node dissection.     Retro vesicular and right pelvic sidewall collections measuring up to 6.6 cm abutting the sigmoid colon and right pelvic vasculature respectively.  Findings favored to represent hematomas with differential consideration including abscesses, attention on follow-up.     Scattered small amounts of extraperitoneal intra-abdominopelvic air along the periphery of the pelvis and similar scattered small amounts of retroperitoneal air.  The etiology is uncertain. Difficult to exclude trace residual intraperitoneal free air given the amount of extraperitoneal air.     Extensive soft tissue air throughout  the body wall anteriorly and laterally extending into the inguinal areas and proximal thighs predominately on the left along the inter fascial tissues inferiorly out of view.  No significant inflammatory changes or fluid collections to suggest infectious process or abscess.  Suspect iatrogenic etiology of air from recent laparoscopic surgery.  Recommend clinical correlation for possible gas-forming infection which is not entirely excluded.     Thickening of the urinary bladder wall which may be reactive.  Recommend clinical correlation with urinalysis, malignant involvement not entirely excluded.     Hepatomegaly and borderline splenomegaly.    Assessment/Plan:     * Post op infection  - Presented on 5/20 on POD 7 s/p RA-TLH/RSO/pelvic and para aortic LND/omentectomy/peritoneal biopsy/appendectomy with fevers and abdominal pain  - Febrile to 101.1 in ED with leukocytosis of 18 and elevated CRP  - CT findings: New multiple fluid collections in the pelvis.  Retro vesicular fluid collection measures 6.6 x 5.8 x 6.3 cm.  This collection abuts the sigmoid colon.  Right pelvic sidewall collection measures 4.3 x 2.5 x 4.4 cm.  This collection abuts the right iliac/femoral vessels.  - Vaginal cuff intact on pelvic exam with no evidence of cuff abscess or dehiscence   - NPO for potential IR drainage of pelvic fluid collections.   - Broad spectrum abx vanc/zosyn continued on admit.   - Blood cultures and urine cultures pending  - CXR with no acute process  - IVF  - AM CBC and BMP  - Subcutaneous air noted in left lower extremity on CT and xray with crepitus palpable on exam- evaluated by general surgery and no evidence of necrotizing fascitis.         Roberta Kaiser MD  Gynecologic Oncology  [facility] - Emergency Dept

## 2022-05-22 LAB
ANION GAP SERPL CALC-SCNC: 7 MMOL/L (ref 8–16)
BASOPHILS # BLD AUTO: 0.01 K/UL (ref 0–0.2)
BASOPHILS NFR BLD: 0.1 % (ref 0–1.9)
BUN SERPL-MCNC: <3 MG/DL (ref 6–20)
CALCIUM SERPL-MCNC: 8.1 MG/DL (ref 8.7–10.5)
CHLORIDE SERPL-SCNC: 104 MMOL/L (ref 95–110)
CO2 SERPL-SCNC: 25 MMOL/L (ref 23–29)
CREAT SERPL-MCNC: 0.4 MG/DL (ref 0.5–1.4)
CRP SERPL-MCNC: 308.5 MG/L (ref 0–8.2)
DIFFERENTIAL METHOD: ABNORMAL
EOSINOPHIL # BLD AUTO: 0.2 K/UL (ref 0–0.5)
EOSINOPHIL NFR BLD: 1.9 % (ref 0–8)
ERYTHROCYTE [DISTWIDTH] IN BLOOD BY AUTOMATED COUNT: 11.9 % (ref 11.5–14.5)
EST. GFR  (AFRICAN AMERICAN): >60 ML/MIN/1.73 M^2
EST. GFR  (NON AFRICAN AMERICAN): >60 ML/MIN/1.73 M^2
GLUCOSE SERPL-MCNC: 84 MG/DL (ref 70–110)
HCT VFR BLD AUTO: 25.2 % (ref 37–48.5)
HGB BLD-MCNC: 8.2 G/DL (ref 12–16)
IMM GRANULOCYTES # BLD AUTO: 0.07 K/UL (ref 0–0.04)
IMM GRANULOCYTES NFR BLD AUTO: 0.7 % (ref 0–0.5)
LYMPHOCYTES # BLD AUTO: 1.5 K/UL (ref 1–4.8)
LYMPHOCYTES NFR BLD: 13.7 % (ref 18–48)
MCH RBC QN AUTO: 31.2 PG (ref 27–31)
MCHC RBC AUTO-ENTMCNC: 32.5 G/DL (ref 32–36)
MCV RBC AUTO: 96 FL (ref 82–98)
MONOCYTES # BLD AUTO: 0.7 K/UL (ref 0.3–1)
MONOCYTES NFR BLD: 6.4 % (ref 4–15)
NEUTROPHILS # BLD AUTO: 8.3 K/UL (ref 1.8–7.7)
NEUTROPHILS NFR BLD: 77.2 % (ref 38–73)
NRBC BLD-RTO: 0 /100 WBC
PLATELET # BLD AUTO: 209 K/UL (ref 150–450)
PMV BLD AUTO: 9 FL (ref 9.2–12.9)
POTASSIUM SERPL-SCNC: 3 MMOL/L (ref 3.5–5.1)
RBC # BLD AUTO: 2.63 M/UL (ref 4–5.4)
SODIUM SERPL-SCNC: 136 MMOL/L (ref 136–145)
WBC # BLD AUTO: 10.67 K/UL (ref 3.9–12.7)

## 2022-05-22 PROCEDURE — 85025 COMPLETE CBC W/AUTO DIFF WBC: CPT | Performed by: STUDENT IN AN ORGANIZED HEALTH CARE EDUCATION/TRAINING PROGRAM

## 2022-05-22 PROCEDURE — 63600175 PHARM REV CODE 636 W HCPCS: Performed by: STUDENT IN AN ORGANIZED HEALTH CARE EDUCATION/TRAINING PROGRAM

## 2022-05-22 PROCEDURE — 63600175 PHARM REV CODE 636 W HCPCS: Performed by: OBSTETRICS & GYNECOLOGY

## 2022-05-22 PROCEDURE — 86140 C-REACTIVE PROTEIN: CPT | Performed by: STUDENT IN AN ORGANIZED HEALTH CARE EDUCATION/TRAINING PROGRAM

## 2022-05-22 PROCEDURE — 36415 COLL VENOUS BLD VENIPUNCTURE: CPT | Performed by: STUDENT IN AN ORGANIZED HEALTH CARE EDUCATION/TRAINING PROGRAM

## 2022-05-22 PROCEDURE — 99232 PR SUBSEQUENT HOSPITAL CARE,LEVL II: ICD-10-PCS | Mod: ,,, | Performed by: OBSTETRICS & GYNECOLOGY

## 2022-05-22 PROCEDURE — 20600001 HC STEP DOWN PRIVATE ROOM

## 2022-05-22 PROCEDURE — 99232 SBSQ HOSP IP/OBS MODERATE 35: CPT | Mod: ,,, | Performed by: OBSTETRICS & GYNECOLOGY

## 2022-05-22 PROCEDURE — 80048 BASIC METABOLIC PNL TOTAL CA: CPT | Performed by: STUDENT IN AN ORGANIZED HEALTH CARE EDUCATION/TRAINING PROGRAM

## 2022-05-22 PROCEDURE — 25000003 PHARM REV CODE 250: Performed by: STUDENT IN AN ORGANIZED HEALTH CARE EDUCATION/TRAINING PROGRAM

## 2022-05-22 RX ORDER — ENOXAPARIN SODIUM 100 MG/ML
30 INJECTION SUBCUTANEOUS DAILY
Status: DISCONTINUED | OUTPATIENT
Start: 2022-05-22 | End: 2022-05-24 | Stop reason: HOSPADM

## 2022-05-22 RX ADMIN — HYDROMORPHONE HYDROCHLORIDE 1 MG: 1 INJECTION, SOLUTION INTRAMUSCULAR; INTRAVENOUS; SUBCUTANEOUS at 07:05

## 2022-05-22 RX ADMIN — OXYCODONE HYDROCHLORIDE 10 MG: 10 TABLET ORAL at 07:05

## 2022-05-22 RX ADMIN — POTASSIUM BICARBONATE 20 MEQ: 391 TABLET, EFFERVESCENT ORAL at 09:05

## 2022-05-22 RX ADMIN — ONDANSETRON 8 MG: 8 TABLET, ORALLY DISINTEGRATING ORAL at 08:05

## 2022-05-22 RX ADMIN — PIPERACILLIN SODIUM AND TAZOBACTAM SODIUM 4.5 G: 4; .5 INJECTION, POWDER, LYOPHILIZED, FOR SOLUTION INTRAVENOUS at 03:05

## 2022-05-22 RX ADMIN — HYDROMORPHONE HYDROCHLORIDE 1 MG: 1 INJECTION, SOLUTION INTRAMUSCULAR; INTRAVENOUS; SUBCUTANEOUS at 11:05

## 2022-05-22 RX ADMIN — POTASSIUM BICARBONATE 20 MEQ: 391 TABLET, EFFERVESCENT ORAL at 11:05

## 2022-05-22 RX ADMIN — OXYCODONE HYDROCHLORIDE 10 MG: 10 TABLET ORAL at 06:05

## 2022-05-22 RX ADMIN — POTASSIUM BICARBONATE 20 MEQ: 391 TABLET, EFFERVESCENT ORAL at 01:05

## 2022-05-22 RX ADMIN — ENOXAPARIN SODIUM 30 MG: 30 INJECTION, SOLUTION INTRAVENOUS; SUBCUTANEOUS at 05:05

## 2022-05-22 RX ADMIN — OXYCODONE HYDROCHLORIDE 10 MG: 10 TABLET ORAL at 02:05

## 2022-05-22 RX ADMIN — SODIUM CHLORIDE: 0.9 INJECTION, SOLUTION INTRAVENOUS at 02:05

## 2022-05-22 RX ADMIN — PIPERACILLIN SODIUM AND TAZOBACTAM SODIUM 4.5 G: 4; .5 INJECTION, POWDER, LYOPHILIZED, FOR SOLUTION INTRAVENOUS at 12:05

## 2022-05-22 RX ADMIN — HYDROMORPHONE HYDROCHLORIDE 1 MG: 1 INJECTION, SOLUTION INTRAMUSCULAR; INTRAVENOUS; SUBCUTANEOUS at 03:05

## 2022-05-22 RX ADMIN — OXYCODONE HYDROCHLORIDE 10 MG: 10 TABLET ORAL at 12:05

## 2022-05-22 RX ADMIN — POTASSIUM BICARBONATE 20 MEQ: 391 TABLET, EFFERVESCENT ORAL at 04:05

## 2022-05-22 RX ADMIN — PIPERACILLIN SODIUM AND TAZOBACTAM SODIUM 4.5 G: 4; .5 INJECTION, POWDER, LYOPHILIZED, FOR SOLUTION INTRAVENOUS at 11:05

## 2022-05-22 NOTE — PROGRESS NOTES
Pharmacy Vancomycin  Consult Note    Therapy with Vancomycin complete and/or consult discontinued by provider.  Pharmacy will sign off, please re-consult as needed.    Ahsan Garcia D 5/21/2022 10:38 PM

## 2022-05-22 NOTE — PROGRESS NOTES
[facility] - Oncology ([facility])  Gynecologic Oncology  Progress Note      Patient Name: Francis Nagel  MRN: [id number]  Admission Date: 5/20/2022  Hospital Length of Stay: 2 days  Attending Provider: Charmaine Delacruz MD  Primary Care Provider: Primary Doctor No  Principal Problem: Post op infection    Follow-up For: * No surgery found *  Post-Operative Day:    Subjective:      History of Present Illness:  Francis Nagel is a 31 y.o. 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 She presented on POD 1 to the ED with lower extremity swelling and pain and was noted to have extensive crepitus and subcutaneous air, likely from malpositioned trocar placement and insufflation.  She presents today with fevers and chills with a temp of 101 at home as well as moderate abdominal pain partially improved with norco and ibuprofen. She denies nausea/vomiting. She denies dysuria. Reports last BM a few hours ago. Denies foul smelling vaginal discharge or significant vaginal bleeding. She is ambulating without difficulty. She reports her left lower extremity is no longer painful.       Hospital Course:  05/21/2022- POD8 s/p RA-TLH/RSO/pelvic and para aortic LND/omentectomy/peritoneal biopsy/appendectomy admitted for post operative pelvic abscess. Febrile to 101.1 in ED. Multiple small fluid collections in the pelvis largest measuring 6.6 x 5.8 x 6.3 cm. NPO for potential IR drainage of pelvic fluid collections. Broad spectrum abx vanc/zosyn continued on admit. Subcutaneous air noted in left lower extremity- evaluated by general surgery and no evidence of necrotizing fascitis. IR consulted for drainage of likely pelvic abscess.  05/22/2022- POD 1 s/p IR drainage pelvic abscess/POD#9 s/p s/p RA-TLH/RSO/LND/OMX/peritoneal bx/appy. IR drained 90 cc of purulent fluid yesterday with drain output 40cc  overnight. This AM drainage is serosanguinous. Has been afebrile since AM of 5/21 and WBC has normalized. Gram stain of fluid collection with gram neg rods and prelim blood cultures with gram negative rods. Anaerobic and aerobic cultures of fluid collection pending. Vancomycin stopped and zosyn continued until cultures result.       No new subjective & objective note has been filed under this hospital service since the last note was generated.    Assessment/Plan:     * Post op infection  - Presented on 5/20 on POD 7 s/p RA-TLH/RSO/pelvic and para aortic LND/omentectomy/peritoneal biopsy/appendectomy with fevers and abdominal pain   -Now POD 9  - Febrile to 101.1 in ED with leukocytosis of 18 and elevated CRP  - CT findings: New multiple fluid collections in the pelvis.  Retro vesicular fluid collection measures 6.6 x 5.8 x 6.3 cm.  This collection abuts the sigmoid colon.  Right pelvic sidewall collection measures 4.3 x 2.5 x 4.4 cm.  This collection abuts the right iliac/femoral vessels.  - Vaginal cuff intact on pelvic exam with no evidence of cuff abscess or dehiscence   - POD 1 s/p IR drainage of pelvic fluid collection. 90cc purulent fluid obtained with 40cc of output overnight   - Gram stain with WBCs and gram negative rods   - Anaerobic and aerobic cultures pending   - Tlast @ 0500 on 5/21   - Leukocytosis has now resolved. CRP elevated to 300 2/2 IR drainage. Will continue to trend.  - Blood cultures prelim results gram negative rods   - Zosyn continued until sensitivities result, vanc discontinued on 5/21  - CXR with no acute process  - Trending CBC, CRP, BMP   - Replace electrolytes PRN  - Lovenox for VTE ppx while inpatient  - Subcutaneous air noted in left lower extremity on CT and xray with crepitus palpable on exam- evaluated by general surgery and no evidence of necrotizing fascitis.       VTE Risk Mitigation (From admission, onward)         Ordered     enoxaparin injection 30 mg  Daily          05/22/22 0851     IP VTE HIGH RISK PATIENT  Once         05/20/22 2353     Place sequential compression device  Until discontinued         05/20/22 6267                Roberta Kaiser MD  Gynecologic Oncology  [facility] - Oncology ([facility])

## 2022-05-22 NOTE — PLAN OF CARE
Plan of care discussed with patient at start of shift. Free from falls and injuries. Resting quietly with eyes closed. Respirations even, unlabored. Skin warm and dry. Pain managed with oxycodone 10 mg PO x's 2, and Dilaudid 1 mg IV x's 1. Denies nausea. NICOLE drain intact.  remains at bedside. Frequent checks for pain and safety maintained. Bed in lowest position, wheels locked, side rails up x's 2, call light in reach. Instructed to call for assistance as needed, verbalizes understanding. Will continue to monitor.

## 2022-05-22 NOTE — ASSESSMENT & PLAN NOTE
- Presented on 5/20 on POD 7 s/p RA-TLH/RSO/pelvic and para aortic LND/omentectomy/peritoneal biopsy/appendectomy with fevers and abdominal pain   -Now POD 10  - Febrile to 101.1 in ED with leukocytosis of 18 and elevated CRP  - CT findings: New multiple fluid collections in the pelvis.  Retro vesicular fluid collection measures 6.6 x 5.8 x 6.3 cm.  This collection abuts the sigmoid colon.  Right pelvic sidewall collection measures 4.3 x 2.5 x 4.4 cm.  This collection abuts the right iliac/femoral vessels.  - Vaginal cuff intact on pelvic exam with no evidence of cuff abscess or dehiscence   - POD 2 s/p IR drainage of pelvic fluid collection. Drain output 10 cc in past 24 hours > will discuss removal of drain today   - Gram stain with WBCs and gram negative rods   - Anaerobic and aerobic cultures pending   - Tlast @ 0500 on 5/21   - Leukocytosis has now resolved. CRP elevated to 300 2/2 IR drainage   - Repeat CRP pending  - Blood cultures prelim results gram negative rods   - Zosyn continued until sensitivities result, vanc discontinued on 5/21  - CXR with no acute process  - Trending CBC, CRP, BMP   - Replace electrolytes PRN  - Lovenox for VTE ppx while inpatient  - Subcutaneous air noted in left lower extremity on CT and xray with crepitus palpable on exam- evaluated by general surgery and no evidence of necrotizing fascitis.

## 2022-05-22 NOTE — SUBJECTIVE & OBJECTIVE
Interval Hx:  NAEO. Patient reports pain worse since IR drainage yesterday. Has required both oxy IR and dilaudid for BTP. Reports nausea at present and currently taking zofran. Was able to tolerate PO yesterday. Has not ambulated significantly 2/2 pain. Voiding spontaneously. Denies fevers/chills.     Scheduled Meds:   piperacillin-tazobactam (ZOSYN) IVPB  4.5 g Intravenous Q8H     Continuous Infusions:   sodium chloride 0.9% Stopped (05/22/22 0820)     PRN Meds:acetaminophen, HYDROmorphone, ibuprofen, ondansetron, oxyCODONE, oxyCODONE, prochlorperazine, sodium chloride 0.9%    Review of patient's allergies indicates:  No Known Allergies    Objective:     Vital Signs (Most Recent):  Temp: 98.4 °F (36.9 °C) (05/22/22 0715)  Pulse: 94 (05/22/22 0715)  Resp: 17 (05/22/22 0715)  BP: 118/73 (05/22/22 0715)  SpO2: 97 % (05/22/22 0715) Vital Signs (24h Range):  Temp:  [97.8 °F (36.6 °C)-100.2 °F (37.9 °C)] 98.4 °F (36.9 °C)  Pulse:  [] 94  Resp:  [16-21] 17  SpO2:  [95 %-98 %] 97 %  BP: ()/(57-73) 118/73     Weight: 48.3 kg (106 lb 7.7 oz)  Body mass index is 20.12 kg/m².    Intake/Output - Last 3 Shifts         05/20 0700 05/21 0659 05/21 0700 05/22 0659 05/22 0700 05/23 0659    P.O.  360     I.V. (mL/kg)  2996.2 (62)     IV Piggyback  1731.5     Total Intake(mL/kg)  5087.7 (105.3)     Urine (mL/kg/hr) 400 1000 (0.9)     Drains  40     Other  90     Total Output 400 1130     Net -400 +3957.7            Urine Occurrence  1 x                Physical Exam:   Constitutional: She is oriented to person, place, and time. She appears well-developed and well-nourished.    HENT:   Head: Normocephalic.    Eyes: EOM are normal.     Cardiovascular:  Normal rate.             Pulmonary/Chest: Effort normal.        Abdominal: Soft. She exhibits abdominal incision (Laparoscopic incisions c/d/i). She exhibits no distension. There is abdominal tenderness (Mild diffuse TTP- improved). There is no rebound and no guarding.   NICOLE  drain at right hip with serosanguinous drainage     Genitourinary:    Genitourinary Comments: Deferred. See H&P for initial exam             Musculoskeletal: Moves all extremeties.       Neurological: She is alert and oriented to person, place, and time.    Skin: Skin is warm and dry.    Psychiatric: She has a normal mood and affect. Her behavior is normal.     Lines/Drains/Airways       Drain  Duration                  Closed/Suction Drain 05/21/22 1157 Right Other (Comment) Bulb 14 Fr. <1 day              Peripheral Intravenous Line  Duration                  Peripheral IV - Single Lumen 05/20/22 1920 20 G Left Antecubital 1 day                    Laboratory:  CBC:   Recent Labs   Lab 05/20/22 1956 05/21/22 0229 05/22/22 0258   WBC 18.55* 16.17* 10.67   HGB 11.3* 8.4* 8.2*   HCT 33.4* 25.4* 25.2*    259 209     , CMP:   Recent Labs   Lab 05/20/22 1956 05/21/22 0229 05/22/22 0258   * 137 136   K 3.8 3.5 3.0*   CL 98 104 104   CO2 24 24 25   * 114* 84   BUN 5* 4* <3*   CREATININE 0.6 0.5 0.4*   CALCIUM 9.4 8.2* 8.1*   PROT 8.6*  --   --    ALBUMIN 3.1*  --   --    BILITOT 0.8  --   --    ALKPHOS 92  --   --    AST 47*  --   --    ALT 42  --   --    ANIONGAP 11 9 7*   EGFRNONAA >60.0 >60.0 >60.0     , Urine Studies:   Recent Labs   Lab 05/20/22 2049   COLORU Yellow   APPEARANCEUA Clear   PHUR 6.0   SPECGRAV 1.010   PROTEINUA Negative   GLUCUA Negative   KETONESU Trace*   BILIRUBINUA Negative   OCCULTUA 1+*   NITRITE Negative   LEUKOCYTESUR Negative   RBCUA 1   WBCUA 3   BACTERIA Occasional   SQUAMEPITHEL 4     , and All pertinent labs from the last 24 hours have been reviewed.    Diagnostic Results:  CT ABDOMEN PELVIS WITHOUT CONTRAST     CLINICAL HISTORY:  Abdominal abscess/infection suspected;     TECHNIQUE:  Routine axial CT images of the abdomen and pelvis were obtained without contrast.  .  Coronal and Sagittal reformatted images were also obtained.     COMPARISON:  No convincing  PET-CT 04/21/2022; chest radiograph 05/20/2022     FINDINGS:  The lung bases are unremarkable.  There is no pleural fluid present.  The visualized portions of the heart appear normal.     The liver is mildly enlarged measuring 18 cm with no focal hepatic abnormality.  The gallbladder shows no evidence of stones or pericholecystic fluid.  There is no intra-or extrahepatic biliary ductal dilatation.     The spleen is borderline mildly enlarged.  The stomach, pancreas, and adrenal glands are unremarkable.     The kidneys are normal in size and location.  There is no evidence of hydronephrosis.     The abdominal aorta is normal in course and caliber without significant atherosclerotic calcifications.     The visualized loops of small and large bowel show no evidence of obstruction or inflammation.     The osseous structures are intact without suspicious osseous lesions.     Postoperative changes of left salpingo-oophorectomy for reported neoplasm.     Interval changes of recent hysterectomy with right salpingo-oophorectomy, appendectomy, omentectomy and bilateral pelvic and periaortic lymph node dissection.     New multiple fluid collections in the pelvis.  Retro vesicular fluid collection measures 6.6 x 5.8 x 6.3 cm.  This collection abuts the sigmoid colon.  Right pelvic sidewall collection measures 4.3 x 2.5 x 4.4 cm.  This collection abuts the right iliac/femoral vessels.     Generalized stranding edema of the pelvis and mesentery to a lesser extent.  Trace free fluid.     There is circumferential thickening of the urinary bladder allowing for nondistention.     There is scattered extraperitoneal small gas foci along the periphery of the pelvis with small foci extending along the lateral aspect of the pericolonic gutter.  There are small scattered gas foci along the retroperitoneal posterior aspect of the psoas soft tissues.  Difficult to exclude trace peripheral intraperitoneal free air.     There is extensive  subcutaneous emphysema about the body wall anteriorly and laterally.  Several gas foci extend along the bilateral inguinal areas in proximal thighs predominant on the left with extension into the inter fascial tissues of the left lower extremity inferiorly out of view.  Please refer to CT of the left thigh for further evaluation.     Impression:     Interval operative changes of hysterectomy, right salpingo-oophorectomy, reported omentectomy, and bilateral pelvic/periaortic lymph node dissection.     Retro vesicular and right pelvic sidewall collections measuring up to 6.6 cm abutting the sigmoid colon and right pelvic vasculature respectively.  Findings favored to represent hematomas with differential consideration including abscesses, attention on follow-up.     Scattered small amounts of extraperitoneal intra-abdominopelvic air along the periphery of the pelvis and similar scattered small amounts of retroperitoneal air.  The etiology is uncertain. Difficult to exclude trace residual intraperitoneal free air given the amount of extraperitoneal air.     Extensive soft tissue air throughout the body wall anteriorly and laterally extending into the inguinal areas and proximal thighs predominately on the left along the inter fascial tissues inferiorly out of view.  No significant inflammatory changes or fluid collections to suggest infectious process or abscess.  Suspect iatrogenic etiology of air from recent laparoscopic surgery.  Recommend clinical correlation for possible gas-forming infection which is not entirely excluded.     Thickening of the urinary bladder wall which may be reactive.  Recommend clinical correlation with urinalysis, malignant involvement not entirely excluded.     Hepatomegaly and borderline splenomegaly.

## 2022-05-22 NOTE — ASSESSMENT & PLAN NOTE
- Presented on 5/20 on POD 7 s/p RA-TLH/RSO/pelvic and para aortic LND/omentectomy/peritoneal biopsy/appendectomy with fevers and abdominal pain   -Now POD 9  - Febrile to 101.1 in ED with leukocytosis of 18 and elevated CRP  - CT findings: New multiple fluid collections in the pelvis.  Retro vesicular fluid collection measures 6.6 x 5.8 x 6.3 cm.  This collection abuts the sigmoid colon.  Right pelvic sidewall collection measures 4.3 x 2.5 x 4.4 cm.  This collection abuts the right iliac/femoral vessels.  - Vaginal cuff intact on pelvic exam with no evidence of cuff abscess or dehiscence   - POD 1 s/p IR drainage of pelvic fluid collection. 90cc purulent fluid obtained with 40cc of output overnight   - Gram stain with WBCs and gram negative rods   - Anaerobic and aerobic cultures pending   - Tlast @ 0500 on 5/21   - Leukocytosis has now resolved. CRP elevated to 300 2/2 IR drainage. Will continue to trend.  - Blood cultures prelim results gram negative rods   - Zosyn continued until sensitivities result, vanc discontinued on 5/21  - CXR with no acute process  - Trending CBC, CRP, BMP   - Replace electrolytes PRN  - Subcutaneous air noted in left lower extremity on CT and xray with crepitus palpable on exam- evaluated by general surgery and no evidence of necrotizing fascitis.

## 2022-05-22 NOTE — PLAN OF CARE
Patient AAOx4. Complaints of nausea; relieved with PRN zofran. K replaced. Pain managed with PRN analgesics. Ambulates independently to bathroom. NICOLE drain in place to R lower back; draining bloody fluid. IV zosyn continued. Bed in low locked position, call light and personal items within reach. Instructed to call if needed.

## 2022-05-23 LAB
ANION GAP SERPL CALC-SCNC: 8 MMOL/L (ref 8–16)
BACTERIA SPEC AEROBE CULT: ABNORMAL
BASOPHILS # BLD AUTO: 0.01 K/UL (ref 0–0.2)
BASOPHILS NFR BLD: 0.2 % (ref 0–1.9)
BUN SERPL-MCNC: 5 MG/DL (ref 6–20)
CALCIUM SERPL-MCNC: 9.2 MG/DL (ref 8.7–10.5)
CHLORIDE SERPL-SCNC: 103 MMOL/L (ref 95–110)
CO2 SERPL-SCNC: 29 MMOL/L (ref 23–29)
COMMENT: NORMAL
CREAT SERPL-MCNC: 0.5 MG/DL (ref 0.5–1.4)
CRP SERPL-MCNC: 187.1 MG/L (ref 0–8.2)
DIFFERENTIAL METHOD: ABNORMAL
EOSINOPHIL # BLD AUTO: 0.2 K/UL (ref 0–0.5)
EOSINOPHIL NFR BLD: 3.1 % (ref 0–8)
ERYTHROCYTE [DISTWIDTH] IN BLOOD BY AUTOMATED COUNT: 11.7 % (ref 11.5–14.5)
EST. GFR  (AFRICAN AMERICAN): >60 ML/MIN/1.73 M^2
EST. GFR  (NON AFRICAN AMERICAN): >60 ML/MIN/1.73 M^2
FINAL PATHOLOGIC DIAGNOSIS: NORMAL
GLUCOSE SERPL-MCNC: 86 MG/DL (ref 70–110)
GROSS: NORMAL
HCT VFR BLD AUTO: 27.3 % (ref 37–48.5)
HGB BLD-MCNC: 9.1 G/DL (ref 12–16)
IMM GRANULOCYTES # BLD AUTO: 0.02 K/UL (ref 0–0.04)
IMM GRANULOCYTES NFR BLD AUTO: 0.3 % (ref 0–0.5)
LYMPHOCYTES # BLD AUTO: 1.6 K/UL (ref 1–4.8)
LYMPHOCYTES NFR BLD: 27.1 % (ref 18–48)
Lab: NORMAL
MCH RBC QN AUTO: 30.8 PG (ref 27–31)
MCHC RBC AUTO-ENTMCNC: 33.3 G/DL (ref 32–36)
MCV RBC AUTO: 93 FL (ref 82–98)
MONOCYTES # BLD AUTO: 0.5 K/UL (ref 0.3–1)
MONOCYTES NFR BLD: 8.7 % (ref 4–15)
NEUTROPHILS # BLD AUTO: 3.6 K/UL (ref 1.8–7.7)
NEUTROPHILS NFR BLD: 60.6 % (ref 38–73)
NRBC BLD-RTO: 0 /100 WBC
PLATELET # BLD AUTO: 299 K/UL (ref 150–450)
PMV BLD AUTO: 8.9 FL (ref 9.2–12.9)
POTASSIUM SERPL-SCNC: 3.6 MMOL/L (ref 3.5–5.1)
RBC # BLD AUTO: 2.95 M/UL (ref 4–5.4)
SODIUM SERPL-SCNC: 140 MMOL/L (ref 136–145)
WBC # BLD AUTO: 5.87 K/UL (ref 3.9–12.7)

## 2022-05-23 PROCEDURE — 85025 COMPLETE CBC W/AUTO DIFF WBC: CPT | Performed by: STUDENT IN AN ORGANIZED HEALTH CARE EDUCATION/TRAINING PROGRAM

## 2022-05-23 PROCEDURE — 99232 SBSQ HOSP IP/OBS MODERATE 35: CPT | Mod: ,,, | Performed by: OBSTETRICS & GYNECOLOGY

## 2022-05-23 PROCEDURE — 25000003 PHARM REV CODE 250: Performed by: STUDENT IN AN ORGANIZED HEALTH CARE EDUCATION/TRAINING PROGRAM

## 2022-05-23 PROCEDURE — 99232 PR SUBSEQUENT HOSPITAL CARE,LEVL II: ICD-10-PCS | Mod: ,,, | Performed by: OBSTETRICS & GYNECOLOGY

## 2022-05-23 PROCEDURE — 36415 COLL VENOUS BLD VENIPUNCTURE: CPT | Performed by: STUDENT IN AN ORGANIZED HEALTH CARE EDUCATION/TRAINING PROGRAM

## 2022-05-23 PROCEDURE — 87040 BLOOD CULTURE FOR BACTERIA: CPT | Performed by: STUDENT IN AN ORGANIZED HEALTH CARE EDUCATION/TRAINING PROGRAM

## 2022-05-23 PROCEDURE — 63600175 PHARM REV CODE 636 W HCPCS: Performed by: STUDENT IN AN ORGANIZED HEALTH CARE EDUCATION/TRAINING PROGRAM

## 2022-05-23 PROCEDURE — 20600001 HC STEP DOWN PRIVATE ROOM

## 2022-05-23 PROCEDURE — 86140 C-REACTIVE PROTEIN: CPT | Performed by: STUDENT IN AN ORGANIZED HEALTH CARE EDUCATION/TRAINING PROGRAM

## 2022-05-23 PROCEDURE — 80048 BASIC METABOLIC PNL TOTAL CA: CPT | Performed by: STUDENT IN AN ORGANIZED HEALTH CARE EDUCATION/TRAINING PROGRAM

## 2022-05-23 RX ORDER — AMOXICILLIN 250 MG
1 CAPSULE ORAL 2 TIMES DAILY PRN
Status: DISCONTINUED | OUTPATIENT
Start: 2022-05-23 | End: 2022-05-24 | Stop reason: HOSPADM

## 2022-05-23 RX ADMIN — PIPERACILLIN SODIUM AND TAZOBACTAM SODIUM 4.5 G: 4; .5 INJECTION, POWDER, LYOPHILIZED, FOR SOLUTION INTRAVENOUS at 07:05

## 2022-05-23 RX ADMIN — PIPERACILLIN SODIUM AND TAZOBACTAM SODIUM 4.5 G: 4; .5 INJECTION, POWDER, LYOPHILIZED, FOR SOLUTION INTRAVENOUS at 03:05

## 2022-05-23 RX ADMIN — OXYCODONE HYDROCHLORIDE 10 MG: 10 TABLET ORAL at 04:05

## 2022-05-23 RX ADMIN — ENOXAPARIN SODIUM 30 MG: 30 INJECTION, SOLUTION INTRAVENOUS; SUBCUTANEOUS at 04:05

## 2022-05-23 RX ADMIN — OXYCODONE HYDROCHLORIDE 10 MG: 10 TABLET ORAL at 08:05

## 2022-05-23 RX ADMIN — PIPERACILLIN SODIUM AND TAZOBACTAM SODIUM 4.5 G: 4; .5 INJECTION, POWDER, LYOPHILIZED, FOR SOLUTION INTRAVENOUS at 11:05

## 2022-05-23 RX ADMIN — OXYCODONE HYDROCHLORIDE 10 MG: 10 TABLET ORAL at 09:05

## 2022-05-23 RX ADMIN — SENNOSIDES AND DOCUSATE SODIUM 1 TABLET: 50; 8.6 TABLET ORAL at 10:05

## 2022-05-23 RX ADMIN — OXYCODONE HYDROCHLORIDE 10 MG: 10 TABLET ORAL at 01:05

## 2022-05-23 NOTE — PROGRESS NOTES
[facility] - Oncology ([facility])  Gynecologic Oncology  Progress Note      Patient Name: Francis Nagel  MRN: [id number]  Admission Date: 5/20/2022  Hospital Length of Stay: 3 days  Attending Provider: Charmaine Delacruz MD  Primary Care Provider: Primary Doctor No  Principal Problem: Post op infection    Follow-up For: * No surgery found *  Post-Operative Day:    Subjective:      History of Present Illness:  Francis Nagel is a 31 y.o. 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 She presented on POD 1 to the ED with lower extremity swelling and pain and was noted to have extensive crepitus and subcutaneous air, likely from malpositioned trocar placement and insufflation.  She presents today with fevers and chills with a temp of 101 at home as well as moderate abdominal pain partially improved with norco and ibuprofen. She denies nausea/vomiting. She denies dysuria. Reports last BM a few hours ago. Denies foul smelling vaginal discharge or significant vaginal bleeding. She is ambulating without difficulty. She reports her left lower extremity is no longer painful.       Hospital Course:  05/21/2022- POD8 s/p RA-TLH/RSO/pelvic and para aortic LND/omentectomy/peritoneal biopsy/appendectomy admitted for post operative pelvic abscess. Febrile to 101.1 in ED. Multiple small fluid collections in the pelvis largest measuring 6.6 x 5.8 x 6.3 cm. NPO for potential IR drainage of pelvic fluid collections. Broad spectrum abx vanc/zosyn continued on admit. Subcutaneous air noted in left lower extremity- evaluated by general surgery and no evidence of necrotizing fascitis. IR consulted for drainage of likely pelvic abscess.  05/22/2022- POD 1 s/p IR drainage pelvic abscess/POD#9 s/p s/p RA-TLH/RSO/LND/OMX/peritoneal bx/appy. IR drained 90 cc of purulent fluid yesterday with drain output 40cc  overnight. This AM drainage is serosanguinous. Has been afebrile since AM of 5/21 and WBC has normalized. Gram stain of fluid collection with gram neg rods and prelim blood cultures with gram negative rods. Anaerobic and aerobic cultures of fluid collection pending. Vancomycin stopped and zosyn continued until cultures result.   05/23/2022 - POD#2 s/p IR drainage pelvic abscess/POD#10 s/p RA-TLH/RSO/LND/OMX/peritoneal bx/appy. Drain output 10 cc of serosanginous fluid in past 24 hours. Patient remains afebrile. Continued on Zosyn while awaiting sensitivities of cultures. Potassium replaced appropriately.      Interval History: NAEON. Patient reports she slept well through the night. Endorses some pain at site of drain. Required Oxy 10 x1 and Dilaudid 1mg x1 overnight. Tolerating PO intake with minimal nausea, no episodes of emesis. Ambulating and voiding without difficulty. Passing flatus.    Scheduled Meds:   enoxaparin  30 mg Subcutaneous Daily    piperacillin-tazobactam (ZOSYN) IVPB  4.5 g Intravenous Q8H     Continuous Infusions:  PRN Meds:acetaminophen, HYDROmorphone, ibuprofen, ondansetron, oxyCODONE, oxyCODONE, prochlorperazine, sodium chloride 0.9%    Review of patient's allergies indicates:  No Known Allergies    Objective:     Vital Signs (Most Recent):  Temp: 97.9 °F (36.6 °C) (05/23/22 0410)  Pulse: 83 (05/23/22 0730)  Resp: 16 (05/23/22 0410)  BP: 97/61 (05/23/22 0730)  SpO2: 97 % (05/23/22 0730) Vital Signs (24h Range):  Temp:  [97.9 °F (36.6 °C)-99.3 °F (37.4 °C)] 97.9 °F (36.6 °C)  Pulse:  [] 83  Resp:  [16-18] 16  SpO2:  [95 %-97 %] 97 %  BP: ()/(56-69) 97/61     Weight: 48.3 kg (106 lb 7.7 oz)  Body mass index is 20.12 kg/m².    Intake/Output - Last 3 Shifts         05/21 0700  05/22 0659 05/22 0700 05/23 0659 05/23 0700 05/24 0659    P.O. 360 240     I.V. (mL/kg) 2996.2 (62) 89 (1.8)     IV Piggyback 1731.5 141.5 99.8    Total Intake(mL/kg) 5087.7 (105.3) 470.5 (9.7) 99.8 (2.1)     Urine (mL/kg/hr) 1000 (0.9) 0 (0)     Drains 40 5     Other 90      Total Output 1130 5     Net +3957.7 +465.5 +99.8           Urine Occurrence 1 x 3 x                Physical Exam:   Constitutional: She is oriented to person, place, and time. She appears well-developed and well-nourished.    HENT:   Head: Normocephalic.    Eyes: EOM are normal.     Cardiovascular:  Normal rate.             Pulmonary/Chest: Effort normal.        Abdominal: Soft. She exhibits abdominal incision (Laparoscopic incisions c/d/i). She exhibits no distension. There is abdominal tenderness (Mild tenderness near drain site, no signs of infection). There is no rebound and no guarding.   NICOLE drain at right hip with serosanguinous drainage     Genitourinary:    Genitourinary Comments: Deferred. See H&P for initial exam             Musculoskeletal: Moves all extremeties.       Neurological: She is alert and oriented to person, place, and time.    Skin: Skin is warm and dry.    Psychiatric: She has a normal mood and affect. Her behavior is normal.     Lines/Drains/Airways       Drain  Duration                  Closed/Suction Drain 05/21/22 1157 Right Other (Comment) Bulb 14 Fr. 1 day              Peripheral Intravenous Line  Duration                  Peripheral IV - Single Lumen 05/22/22 2330 20 G Anterior;Right Forearm <1 day                    Laboratory:  BMP:   Recent Labs   Lab 05/22/22 0258 05/23/22 0229   GLU 84 86    140   K 3.0* 3.6    103   CO2 25 29   BUN <3* 5*   CREATININE 0.4* 0.5   CALCIUM 8.1* 9.2    and CBC:   Recent Labs   Lab 05/22/22 0258 05/23/22 0229   WBC 10.67 5.87   HGB 8.2* 9.1*   HCT 25.2* 27.3*    299       Diagnostic Results:  Labs: Reviewed    Assessment/Plan:     * Post op infection  - Presented on 5/20 on POD 7 s/p RA-TLH/RSO/pelvic and para aortic LND/omentectomy/peritoneal biopsy/appendectomy with fevers and abdominal pain   -Now POD 10  - Febrile to 101.1 in ED with leukocytosis of 18 and  elevated CRP  - CT findings: New multiple fluid collections in the pelvis.  Retro vesicular fluid collection measures 6.6 x 5.8 x 6.3 cm.  This collection abuts the sigmoid colon.  Right pelvic sidewall collection measures 4.3 x 2.5 x 4.4 cm.  This collection abuts the right iliac/femoral vessels.  - Vaginal cuff intact on pelvic exam with no evidence of cuff abscess or dehiscence   - POD 2 s/p IR drainage of pelvic fluid collection. Drain output 10 cc in past 24 hours > will discuss removal of drain today   - Gram stain with WBCs and gram negative rods   - Anaerobic and aerobic cultures pending   - Tlast @ 0500 on 5/21   - Leukocytosis has now resolved. CRP elevated to 300 2/2 IR drainage   - Repeat CRP pending  - Blood cultures prelim results gram negative rods   - Zosyn continued until sensitivities result, vanc discontinued on 5/21  - CXR with no acute process  - Trending CBC, CRP, BMP   - Replace electrolytes PRN  - Lovenox for VTE ppx while inpatient  - Subcutaneous air noted in left lower extremity on CT and xray with crepitus palpable on exam- evaluated by general surgery and no evidence of necrotizing fascitis.       VTE Risk Mitigation (From admission, onward)         Ordered     enoxaparin injection 30 mg  Daily         05/22/22 0851     IP VTE HIGH RISK PATIENT  Once         05/20/22 2358     Place sequential compression device  Until discontinued         05/20/22 2355                Was gomez catheter removed? Yes    Marc Shearer MD  Gynecologic Oncology  [facility] - Oncology ([facility])

## 2022-05-23 NOTE — PLAN OF CARE
Problem: Adult Inpatient Plan of Care  Goal: Plan of Care Review  Outcome: Ongoing, Progressing     Problem: Adult Inpatient Plan of Care  Goal: Absence of Hospital-Acquired Illness or Injury  Outcome: Ongoing, Progressing     Problem: Adult Inpatient Plan of Care  Goal: Readiness for Transition of Care  Outcome: Ongoing, Progressing     Problem: Fall Injury Risk  Goal: Absence of Fall and Fall-Related Injury  Outcome: Ongoing, Progressing

## 2022-05-23 NOTE — PLAN OF CARE
Problem: Adult Inpatient Plan of Care  Goal: Plan of Care Review  Outcome: Ongoing, Progressing   Plan of care reviewed with patietn at beginning of shift. VSS and afebrile this shift. NICOLE drain in place with minimal output this shift.  Pain managed with po medication . BM this shift after stool softener given .  CRP rending down Patient remain on Zosyn . Repeat blood cultures drawn today .   Patient remains independent .

## 2022-05-23 NOTE — SUBJECTIVE & OBJECTIVE
Interval History: NAEON. Patient reports she slept well through the night. Endorses some pain at site of drain. Required Oxy 10 x1 and Dilaudid 1mg x1 overnight. Tolerating PO intake with minimal nausea, no episodes of emesis. Ambulating and voiding without difficulty. Passing flatus.    Scheduled Meds:   enoxaparin  30 mg Subcutaneous Daily    piperacillin-tazobactam (ZOSYN) IVPB  4.5 g Intravenous Q8H     Continuous Infusions:  PRN Meds:acetaminophen, HYDROmorphone, ibuprofen, ondansetron, oxyCODONE, oxyCODONE, prochlorperazine, sodium chloride 0.9%    Review of patient's allergies indicates:  No Known Allergies    Objective:     Vital Signs (Most Recent):  Temp: 97.9 °F (36.6 °C) (05/23/22 0410)  Pulse: 83 (05/23/22 0730)  Resp: 16 (05/23/22 0410)  BP: 97/61 (05/23/22 0730)  SpO2: 97 % (05/23/22 0730) Vital Signs (24h Range):  Temp:  [97.9 °F (36.6 °C)-99.3 °F (37.4 °C)] 97.9 °F (36.6 °C)  Pulse:  [] 83  Resp:  [16-18] 16  SpO2:  [95 %-97 %] 97 %  BP: ()/(56-69) 97/61     Weight: 48.3 kg (106 lb 7.7 oz)  Body mass index is 20.12 kg/m².    Intake/Output - Last 3 Shifts         05/21 0700 05/22 0659 05/22 0700 05/23 0659 05/23 0700 05/24 0659    P.O. 360 240     I.V. (mL/kg) 2996.2 (62) 89 (1.8)     IV Piggyback 1731.5 141.5 99.8    Total Intake(mL/kg) 5087.7 (105.3) 470.5 (9.7) 99.8 (2.1)    Urine (mL/kg/hr) 1000 (0.9) 0 (0)     Drains 40 5     Other 90      Total Output 1130 5     Net +3957.7 +465.5 +99.8           Urine Occurrence 1 x 3 x                Physical Exam:   Constitutional: She is oriented to person, place, and time. She appears well-developed and well-nourished.    HENT:   Head: Normocephalic.    Eyes: EOM are normal.     Cardiovascular:  Normal rate.             Pulmonary/Chest: Effort normal.        Abdominal: Soft. She exhibits abdominal incision (Laparoscopic incisions c/d/i). She exhibits no distension. There is abdominal tenderness (Mild tenderness near drain site, no signs of  infection). There is no rebound and no guarding.   NICOLE drain at right hip with serosanguinous drainage     Genitourinary:    Genitourinary Comments: Deferred. See H&P for initial exam             Musculoskeletal: Moves all extremeties.       Neurological: She is alert and oriented to person, place, and time.    Skin: Skin is warm and dry.    Psychiatric: She has a normal mood and affect. Her behavior is normal.     Lines/Drains/Airways       Drain  Duration                  Closed/Suction Drain 05/21/22 1157 Right Other (Comment) Bulb 14 Fr. 1 day              Peripheral Intravenous Line  Duration                  Peripheral IV - Single Lumen 05/22/22 2330 20 G Anterior;Right Forearm <1 day                    Laboratory:  BMP:   Recent Labs   Lab 05/22/22  0258 05/23/22 0229   GLU 84 86    140   K 3.0* 3.6    103   CO2 25 29   BUN <3* 5*   CREATININE 0.4* 0.5   CALCIUM 8.1* 9.2    and CBC:   Recent Labs   Lab 05/22/22 0258 05/23/22 0229   WBC 10.67 5.87   HGB 8.2* 9.1*   HCT 25.2* 27.3*    299       Diagnostic Results:  Labs: Reviewed

## 2022-05-24 VITALS
TEMPERATURE: 98 F | WEIGHT: 106.5 LBS | SYSTOLIC BLOOD PRESSURE: 111 MMHG | OXYGEN SATURATION: 97 % | HEART RATE: 90 BPM | HEIGHT: 61 IN | DIASTOLIC BLOOD PRESSURE: 75 MMHG | RESPIRATION RATE: 19 BRPM | BODY MASS INDEX: 20.11 KG/M2

## 2022-05-24 LAB
BACTERIA FLD CULT: ABNORMAL

## 2022-05-24 PROCEDURE — 63600175 PHARM REV CODE 636 W HCPCS: Performed by: STUDENT IN AN ORGANIZED HEALTH CARE EDUCATION/TRAINING PROGRAM

## 2022-05-24 PROCEDURE — 25000003 PHARM REV CODE 250: Performed by: STUDENT IN AN ORGANIZED HEALTH CARE EDUCATION/TRAINING PROGRAM

## 2022-05-24 PROCEDURE — 94761 N-INVAS EAR/PLS OXIMETRY MLT: CPT

## 2022-05-24 RX ORDER — HYDROCODONE BITARTRATE AND ACETAMINOPHEN 5; 325 MG/1; MG/1
1 TABLET ORAL EVERY 6 HOURS PRN
Qty: 20 TABLET | Refills: 0 | Status: SHIPPED | OUTPATIENT
Start: 2022-05-24 | End: 2022-06-29 | Stop reason: ALTCHOICE

## 2022-05-24 RX ORDER — SULFAMETHOXAZOLE AND TRIMETHOPRIM 800; 160 MG/1; MG/1
1 TABLET ORAL 2 TIMES DAILY
Qty: 28 TABLET | Refills: 0 | Status: SHIPPED | OUTPATIENT
Start: 2022-05-24 | End: 2022-06-07

## 2022-05-24 RX ADMIN — PIPERACILLIN SODIUM AND TAZOBACTAM SODIUM 4.5 G: 4; .5 INJECTION, POWDER, LYOPHILIZED, FOR SOLUTION INTRAVENOUS at 07:05

## 2022-05-24 RX ADMIN — OXYCODONE HYDROCHLORIDE 10 MG: 10 TABLET ORAL at 09:05

## 2022-05-24 RX ADMIN — OXYCODONE 5 MG: 5 TABLET ORAL at 04:05

## 2022-05-24 NOTE — NURSING
Patient discharged . AVS reviewed with patient. Prescriptions delivered to bedside per request. PIV removed and wrapped. All questions answered prior to discharge .

## 2022-05-24 NOTE — DISCHARGE SUMMARY
[facility] ([facility])  Gynecologic Oncology  Discharge Summary    Patient Name: Francis Nagel  MRN: [id number]  Admission Date: 5/20/2022  Hospital Length of Stay: 4 days  Discharge Date and Time:  05/24/2022 11:02 AM  Attending Physician: Charmaine Delacruz MD   Discharging Provider: Val Lockwood MD  Primary Care Provider: Primary Doctor No    HPI:   Francis Nagel is a 31 y.o. 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 She presented on POD 1 to the ED with lower extremity swelling and pain and was noted to have extensive crepitus and subcutaneous air, likely from malpositioned trocar placement and insufflation.  She presents today with fevers and chills with a temp of 101 at home as well as moderate abdominal pain partially improved with norco and ibuprofen. She denies nausea/vomiting. She denies dysuria. Reports last BM a few hours ago. Denies foul smelling vaginal discharge or significant vaginal bleeding. She is ambulating without difficulty. She reports her left lower extremity is no longer painful.       Hospital Course:  05/21/2022- POD8 s/p RA-TLH/RSO/pelvic and para aortic LND/omentectomy/peritoneal biopsy/appendectomy admitted for post operative pelvic abscess. Febrile to 101.1 in ED. Multiple small fluid collections in the pelvis largest measuring 6.6 x 5.8 x 6.3 cm. NPO for potential IR drainage of pelvic fluid collections. Broad spectrum abx vanc/zosyn continued on admit. Subcutaneous air noted in left lower extremity- evaluated by general surgery and no evidence of necrotizing fascitis. IR consulted for drainage of likely pelvic abscess.  05/22/2022- POD 1 s/p IR drainage pelvic abscess/POD#9 s/p s/p RA-TLH/RSO/LND/OMX/peritoneal bx/appy. IR drained 90 cc of purulent fluid yesterday with drain output 40cc overnight. This AM drainage is  serosanguinous. Has been afebrile since AM of 5/21 and WBC has normalized. Gram stain of fluid collection with gram neg rods and prelim blood cultures with gram negative rods. Anaerobic and aerobic cultures of fluid collection pending. Vancomycin stopped and zosyn continued until cultures result.   05/23/2022 - POD#2 s/p IR drainage pelvic abscess/POD#10 s/p RA-TLH/RSO/LND/OMX/peritoneal bx/appy. Drain output 10 cc of serosanginous fluid in past 24 hours. Patient remains afebrile. Continued on Zosyn while awaiting sensitivities of cultures. Potassium replaced appropriately.  05/24/2022 POD#3 sp IR drainage pelvic abscess/[id number] sp RA-TLH/RSO/LND/OMX/Peritnoneal Bx/Appy. 6 cc of serosanginous fluid in past 24 hours. Patient remains afebrile. Continued on Zosyn while awaiting sensitivities of cultures. Pathology returned all specimen negative for carcinoma.     Aerobic culture and body fluid culture both resulted positive for gram negative rods (klebsiella) with senstivities to Bactrim. Called micro and anerobic culture and fungal culture will take 5 days to result. Discussed with Dr. Delacruz and as patient afebrile since admission, WBC and CRP downtrending on zosyn. Will discharge patient with cultures pending on bactrim DS for 14 days. Discussed all the above with patient. Will continue to follow up on her cultures and if anything returns positive and requires an additional antibiotic, we will call her. IR Drain pulled at bedside.     She has a follow up appointment with Dr. Delacruz set for next week 5/31.       Consults (From admission, onward)        Status Ordering Provider     Inpatient consult to Interventional Radiology  Once        Provider:  (Not yet assigned)    Completed ERNESTINA MONDRAGON     Inpatient consult to Gynecologic Oncology  Once        Provider:  (Not yet assigned)    Acknowledged EKTA MADDOX     Inpatient consult to General surgery  Once        Provider:  (Not yet assigned)    Rolly MADDOX  BHARGAVESH            Pending Diagnostic Studies:     None        Final Active Diagnoses:    Diagnosis Date Noted POA    PRINCIPAL PROBLEM:  Post op infection [T81.40XA] 05/21/2022 Yes    S/P unilateral salpingo-oophorectomy/mass resection [Z90.721] 03/10/2022 Not Applicable      Problems Resolved During this Admission:        Does this patient meet criteria for extended DVT prophylaxis? No    Discharged Condition: good    Disposition: Home or Self Care    Follow Up:   Follow-up Information     Charmaine Delacruz MD. Go on 5/31/2022.    Specialty: Gynecologic Oncology  Why: Post Op  Contact information:  [address]  [facility] [address]  [phone]                       Patient Instructions:      Diet general     Lifting restrictions   Order Comments: No lifting greater than 15 pounds for six weeks.     Other restrictions (specify):   Order Comments: PELVIC REST:  No douching, tampons, or intercourse for 6 weeks.    If prescribed, vaginal estrogen cream may be used during the postoperative period.     DRIVING:  No driving while on narcotics. Driving may be resumed initially with a competent passenger one to two weeks after surgery if no longer taking narcotics.     EXERCISE:  For six weeks your exercise should be limited to walking. You may walk as far as you wish, as long as you increase your level of exertion gradually and avoid slippery surfaces. You may climb stairs as needed to get around, but should not use stair climbing for exercise.     Remove dressing in 24 hours   Order Comments: If you have a bandage on wound, you may remove it the day after dismissal.  If you had steri-strips remove them once they begin to peel off (usually 2 weeks). Keep incision clean and dry.  Inspect the incision daily for signs and symptoms of infection.     Wound care routine (specify)   Order Comments: WOUND CARE:  If you have a band-aid or bandage on your wound, you may remove it the day after dismissal.  You may wash  the wound with mild soap and water.   You may shower at any time but should avoid immersing any abdominal incisions in water for at least two weeks after surgery or until the wound is completely healed. If given, please shower with Hibiclens soap until bottle is completely finished. Keep your wound clean and dry.  You should observe your incision for signs of infection which include redness, warmth, drainage or fever.     Call MD for:  temperature >100.4     Call MD for:  persistent nausea and vomiting     Call MD for:  severe uncontrolled pain     Call MD for:  difficulty breathing, headache or visual disturbances     Call MD for:  redness, tenderness, or signs of infection (pain, swelling, redness, odor or green/yellow discharge around incision site)     Call MD for:  hives     Call MD for:   Order Comments: inability to void,urine is ketchup colored or you have large clots, vaginal bleeding is heavier than a period.    VAGINAL DISCHARGE: You may develop a vaginal discharge and intermittent vaginal spotting after surgery and up to 6 weeks postoperatively.  The discharge may have an odor and may change in color but it is normal.  This is due to dissolving stiches.  Contact your surgical team if you develop vaginal or vulvar irritation along with a discharge.  Also contact your surgical team if you have vaginal discharge that smells like urine or stool.    CONSTIPATION REMEDIES: Patients are often constipated after surgery or with use of oral narcotic medicine. You should continue to take the stool softener, Senokot-S during the next six weeks, and consume adequate amounts of water.  If you have not had a bowel movement for 3 days after dismissal, or are uncomfortable and unable to pass stool, please try one or all of the following measures:  1.  Milk of Magnesia - 30 cc by mouth every 12 hours   2.  Dulcolax suppository - One suppository per rectum every 4-6 hours   3.  Metamucil, Fibercon or other bulk former -  use as directed  4.  Fleets Enema        PAIN MEDICATIONS:     Take your pain medications as instructed. It is best to take pain medications before your pain becomes severe. This will allow you to take less medication yet have better pain relief. For the first 2 or 3 days it may be helpful to take your pain medications on a regular schedule (e.g. every 4 to 6 hours). This will help you to keep your pain under better control. You should then begin to take fewer medications each day until you no longer need them. Do not take pain medication on an empty stomach. This may lead to nausea and vomiting.     Activity as tolerated   Order Comments: Return to normal activity slowly as you feel able.  For 6 weeks your exercise should be limited to walking.  You may walk as far as you wish, as long as you increase your level of exertion gradually and avoid slippery surfaces.    If you had a hysterectomy at the surgery do not insert anything in your vagina for 9 weeks.     Shower on day dressing removed (No bath)   Order Comments: You may shower at any time but should avoid immersing any abdominal incisions in water for at least 2 weeks after surgery or until the wound is completely healed.  If given, please shower with Hibaclens soap until bottle is completely finish     Medications:  Reconciled Home Medications:      Medication List      START taking these medications    sulfamethoxazole-trimethoprim 800-160mg 800-160 mg Tab  Commonly known as: BACTRIM DS  Take 1 tablet by mouth 2 (two) times daily. for 14 days        CONTINUE taking these medications    8 HOUR PAIN RELIEVER 650 MG Tbsr  Generic drug: acetaminophen  Take 1 tablet (650 mg total) by mouth every 6 to 8 hours as needed (Pain). Alternate every 3 hours with ibuprofen.     fexofenadine 180 MG tablet  Commonly known as: ALLEGRA  Take 180 mg by mouth once daily.     HYDROcodone-acetaminophen 5-325 mg per tablet  Commonly known as: NORCO  Take 1 tablet by mouth every 6  (six) hours as needed for Pain.     ibuprofen 600 MG tablet  Commonly known as: ADVIL,MOTRIN  Take 1 tablet (600 mg total) by mouth 3 (three) times daily.     ondansetron 4 MG Tbdl  Commonly known as: ZOFRAN-ODT  Take 1 tablet (4 mg total) by mouth every 6 (six) hours as needed (nausea).            Farrah Lockwood MD  OBGYN PGY-3

## 2022-05-24 NOTE — PHYSICIAN QUERY
PT Name: Francis Nagel  MR #: [id number]     DOCUMENTATION CLARIFICATION     CDS/: CANDI [email or web address]        Contact information:[email or web address]  This form is a permanent document in the medical record.     Query Date: May 24, 2022    By submitting this query, we are merely seeking further clarification of documentation.  Please utilize your independent clinical judgment when addressing the question(s) below.  The Medical Record contains the following:  Indicators Supporting Clinical Findings Location in Medical Record   X HR         RR          BP        Temp Febrile to 101.1 in ED.    Discussed with Dr. Delacruz and as patient afebrile since admission H&P 5 /21    Discharge Summary 5/24    Lactic Acid          Procalcitonin     X WBC           Bands          CRP  WBC=16.17-->10.67-->5.87  IFO=525.5-->187.1 LAB 5/21-/23   X Culture(s) Gram stain of fluid collection with gram neg rods and prelim blood cultures with gram negative rods    Aerobic culture and body fluid culture both resulted positive for gram negative rods (klebsiella) with senstivities to Bactrim. Called micro and anerobic culture and fungal culture will take 5 days to result Discharge Summary 5/24    AMS, Confusion, LOC, etc.      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  [   ] Sepsis due to  unknown organism   [   ] Sepsis due to (suspected) organism (please specify): __________   [   ] Bacteremia without Sepsis   [X   ] Bacteremia with Sepsis   [   ] SIRS with infection but without Sepsis   [   ] Other Infectious Disease (please specify): __________   [   ] Sepsis Ruled Out   [  ] Clinically Undetermined          Please document in your progress notes daily for the duration of treatment until resolved and include in your discharge summary.

## 2022-05-24 NOTE — ASSESSMENT & PLAN NOTE
- Presented on 5/20 on POD 7 s/p RA-TLH/RSO/pelvic and para aortic LND/omentectomy/peritoneal biopsy/appendectomy with fevers and abdominal pain   -Now POD 11  - Febrile to 101.1 in ED with leukocytosis of 18 and elevated CRP  - CT findings: New multiple fluid collections in the pelvis.  Retro vesicular fluid collection measures 6.6 x 5.8 x 6.3 cm.  This collection abuts the sigmoid colon.  Right pelvic sidewall collection measures 4.3 x 2.5 x 4.4 cm.  This collection abuts the right iliac/femoral vessels.  - Vaginal cuff intact on pelvic exam with no evidence of cuff abscess or dehiscence   - POD 4 s/p IR drainage of pelvic fluid collection. Drain output 6 cc in past 24 hours > will discuss removal of drain today   - Tlast @ 0500 on 5/21   - Leukocytosis has now resolved. CRP elevation resolved  5/20 Blood cultures x 2, pre childs neg, NGTD x 4 days.   5/21 Body fluid culture: gram neg rods (e coli, klebsiella pneumonia, proteus vulgarus)  5/21 Gram stain: few gram neg rods, moderate WBC  5/21 Fungal culture in process  5/21 Aerobic culture: rare klebsiella pneumoniae  5/21 anaerobic culture in process  5/22 blood culture x 2 prelim neg, NGTD x 1 day  - Zosyn continued until sensitivities result, vanc discontinued on 5/21  - CXR with no acute process  - Stop trending CBC, CRP, BMP 5/24  - Lovenox for VTE ppx while inpatient  - Subcutaneous air noted in left lower extremity on CT and xray with crepitus palpable on exam- evaluated by general surgery and no evidence of necrotizing fascitis.

## 2022-05-24 NOTE — PROGRESS NOTES
[facility] - Oncology ([facility])  Gynecologic Oncology  Progress Note      Patient Name: Francis Nagel  MRN: [id number]  Admission Date: 5/20/2022  Hospital Length of Stay: 4 days  Attending Provider: Charmaine Delacruz MD  Primary Care Provider: Primary Doctor No  Principal Problem: Post op infection    Subjective:      History of Present Illness:  Francis Nagel is a 31 y.o. 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 She presented on POD 1 to the ED with lower extremity swelling and pain and was noted to have extensive crepitus and subcutaneous air, likely from malpositioned trocar placement and insufflation.  She presents today with fevers and chills with a temp of 101 at home as well as moderate abdominal pain partially improved with norco and ibuprofen. She denies nausea/vomiting. She denies dysuria. Reports last BM a few hours ago. Denies foul smelling vaginal discharge or significant vaginal bleeding. She is ambulating without difficulty. She reports her left lower extremity is no longer painful.       Hospital Course:  05/21/2022- POD8 s/p RA-TLH/RSO/pelvic and para aortic LND/omentectomy/peritoneal biopsy/appendectomy admitted for post operative pelvic abscess. Febrile to 101.1 in ED. Multiple small fluid collections in the pelvis largest measuring 6.6 x 5.8 x 6.3 cm. NPO for potential IR drainage of pelvic fluid collections. Broad spectrum abx vanc/zosyn continued on admit. Subcutaneous air noted in left lower extremity- evaluated by general surgery and no evidence of necrotizing fascitis. IR consulted for drainage of likely pelvic abscess.  05/22/2022- POD 1 s/p IR drainage pelvic abscess/POD#9 s/p s/p RA-TLH/RSO/LND/OMX/peritoneal bx/appy. IR drained 90 cc of purulent fluid yesterday with drain output 40cc overnight. This AM drainage is serosanguinous. Has been  afebrile since AM of 5/21 and WBC has normalized. Gram stain of fluid collection with gram neg rods and prelim blood cultures with gram negative rods. Anaerobic and aerobic cultures of fluid collection pending. Vancomycin stopped and zosyn continued until cultures result.   05/23/2022 - POD#2 s/p IR drainage pelvic abscess/POD#10 s/p RA-TLH/RSO/LND/OMX/peritoneal bx/appy. Drain output 10 cc of serosanginous fluid in past 24 hours. Patient remains afebrile. Continued on Zosyn while awaiting sensitivities of cultures. Potassium replaced appropriately.  05/24/2022 POD#3 sp IR drainage pelvic abscess/UFI903 sp RA-TLH/RSO/LND/OMX/Peritnoneal Bx/Appy. 6 cc of serosanginous fluid in past 24 hours. Patient remains afebrile. Continued on Zosyn while awaiting sensitivities of cultures. Pathology returned all specimen negative for carcinoma.       Interval History: NAEON. Patient reports she slept well through the night. Required Oxy 10 x1 and 5 mg x 1 overnight, no dilaudid. Tolerating PO intake with minimal nausea, no episodes of emesis. Ambulating and voiding without difficulty. Passing flatus.    Scheduled Meds:   enoxaparin  30 mg Subcutaneous Daily    piperacillin-tazobactam (ZOSYN) IVPB  4.5 g Intravenous Q8H     Continuous Infusions:  PRN Meds:acetaminophen, HYDROmorphone, ibuprofen, ondansetron, oxyCODONE, oxyCODONE, prochlorperazine, senna-docusate 8.6-50 mg, sodium chloride 0.9%    Review of patient's allergies indicates:  No Known Allergies    Objective:     Vital Signs (Most Recent):  Temp: 98.4 °F (36.9 °C) (05/24/22 0404)  Pulse: 80 (05/24/22 0404)  Resp: 16 (05/24/22 0404)  BP: 99/67 (05/24/22 0404)  SpO2: 95 % (05/24/22 0404) Vital Signs (24h Range):  Temp:  [98.4 °F (36.9 °C)-98.9 °F (37.2 °C)] 98.4 °F (36.9 °C)  Pulse:  [80-90] 80  Resp:  [5-19] 16  SpO2:  [94 %-98 %] 95 %  BP: ()/(61-85) 99/67     Weight: 48.3 kg (106 lb 7.7 oz)  Body mass index is 20.12 kg/m².    Intake/Output - Last 3 Shifts          05/22 0700  05/23 0659 05/23 0700  05/24 0659 05/24 0700  05/25 0659    P.O. 240 1284     I.V. (mL/kg) 89 (1.8)      IV Piggyback 141.5 99.8     Total Intake(mL/kg) 470.5 (9.7) 1383.8 (28.6)     Urine (mL/kg/hr) 0 (0) 2250 (1.9)     Drains 5 6     Other       Stool  0     Total Output 5 2256     Net +465.5 -872.2            Urine Occurrence 3 x 1 x     Stool Occurrence  1 x                Physical Exam:   Constitutional: She is oriented to person, place, and time. She appears well-developed and well-nourished.    HENT:   Head: Normocephalic.    Eyes: EOM are normal.     Cardiovascular:  Normal rate.             Pulmonary/Chest: Effort normal.        Abdominal: Soft. She exhibits abdominal incision (Laparoscopic incisions c/d/i). She exhibits no distension. There is abdominal tenderness (Mild tenderness near drain site, no signs of infection). There is no rebound and no guarding.   NICOLE drain at right hip with scant serosanguinous drainage     Genitourinary:    Genitourinary Comments: Deferred. See H&P for initial exam             Musculoskeletal: Moves all extremeties.       Neurological: She is alert and oriented to person, place, and time.    Skin: Skin is warm and dry.    Psychiatric: She has a normal mood and affect. Her behavior is normal.     Lines/Drains/Airways       Drain  Duration                  Closed/Suction Drain 05/21/22 1157 Right Other (Comment) Bulb 14 Fr. 2 days              Peripheral Intravenous Line  Duration                  Peripheral IV - Single Lumen 05/22/22 2330 20 G Anterior;Right Forearm 1 day                    Laboratory:  No new labs obtained    Assessment/Plan:     * Post op infection  - Presented on 5/20 on POD 7 s/p RA-TLH/RSO/pelvic and para aortic LND/omentectomy/peritoneal biopsy/appendectomy with fevers and abdominal pain   -Now POD 11  - Febrile to 101.1 in ED with leukocytosis of 18 and elevated CRP  - CT findings: New multiple fluid collections in the pelvis.  Retro  vesicular fluid collection measures 6.6 x 5.8 x 6.3 cm.  This collection abuts the sigmoid colon.  Right pelvic sidewall collection measures 4.3 x 2.5 x 4.4 cm.  This collection abuts the right iliac/femoral vessels.  - Vaginal cuff intact on pelvic exam with no evidence of cuff abscess or dehiscence   - POD 4 s/p IR drainage of pelvic fluid collection. Drain output 6 cc in past 24 hours > will discuss removal of drain today   - Tlast @ 0500 on 5/21   - Leukocytosis has now resolved. CRP elevation resolved  5/20 Blood cultures x 2, pre childs neg, NGTD x 4 days.   5/21 Body fluid culture: gram neg rods (e coli, klebsiella pneumonia, proteus vulgarus)  5/21 Gram stain: few gram neg rods, moderate WBC  5/21 Fungal culture in process  5/21 Aerobic culture: rare klebsiella pneumoniae  5/21 anaerobic culture in process  5/22 blood culture x 2 prelim neg, NGTD x 1 day  - Zosyn continued until sensitivities result, vanc discontinued on 5/21  - CXR with no acute process  - Stop trending CBC, CRP, BMP 5/24  - Lovenox for VTE ppx while inpatient  - Subcutaneous air noted in left lower extremity on CT and xray with crepitus palpable on exam- evaluated by general surgery and no evidence of necrotizing fascitis.     S/P unilateral salpingo-oophorectomy/mass resection  -Pathology resulted: RA-TLH,RSO/Pelvic and Para-aortic LND/Omentectomy/peritoneal biopsy/appendectomy all Negative for atypia, hyperplasia, dysplasia, and malignancy      VTE Risk Mitigation (From admission, onward)         Ordered     enoxaparin injection 30 mg  Daily         05/22/22 0851     IP VTE HIGH RISK PATIENT  Once         05/20/22 2355     Place sequential compression device  Until discontinued         05/20/22 2355                Dispo: pending final cultures to transition patient to PO abx and then discharge.    Farrah Lockwood MD  OBGYN PGY-3

## 2022-05-24 NOTE — PLAN OF CARE
Patient AOX4 cooperative, afebrile. POC reviewed, allowed patient time to asked questions, she verbalized understanding of POC. Ongoing pain management with oxycodone prn, safety maintain, drinking and voiding fine. Call light within reach.    Problem: Adult Inpatient Plan of Care  Goal: Plan of Care Review  Outcome: Ongoing, Progressing     Problem: Adult Inpatient Plan of Care  Goal: Absence of Hospital-Acquired Illness or Injury  Outcome: Ongoing, Progressing     Problem: Adult Inpatient Plan of Care  Goal: Optimal Comfort and Wellbeing  Outcome: Ongoing, Progressing     Problem: Fall Injury Risk  Goal: Absence of Fall and Fall-Related Injury  Outcome: Ongoing, Progressing     Problem: Pain Acute  Goal: Acceptable Pain Control and Functional Ability  Outcome: Ongoing, Progressing

## 2022-05-24 NOTE — ASSESSMENT & PLAN NOTE
-Pathology resulted: RA-TLH,RSO/Pelvic and Para-aortic LND/Omentectomy/peritoneal biopsy/appendectomy all Negative for atypia, hyperplasia, dysplasia, and malignancy

## 2022-05-24 NOTE — SUBJECTIVE & OBJECTIVE
Interval History: NAEON. Patient reports she slept well through the night. Required Oxy 10 x1 and 5 mg x 1 overnight, no dilaudid. Tolerating PO intake with minimal nausea, no episodes of emesis. Ambulating and voiding without difficulty. Passing flatus.    Scheduled Meds:   enoxaparin  30 mg Subcutaneous Daily    piperacillin-tazobactam (ZOSYN) IVPB  4.5 g Intravenous Q8H     Continuous Infusions:  PRN Meds:acetaminophen, HYDROmorphone, ibuprofen, ondansetron, oxyCODONE, oxyCODONE, prochlorperazine, senna-docusate 8.6-50 mg, sodium chloride 0.9%    Review of patient's allergies indicates:  No Known Allergies    Objective:     Vital Signs (Most Recent):  Temp: 98.4 °F (36.9 °C) (05/24/22 0404)  Pulse: 80 (05/24/22 0404)  Resp: 16 (05/24/22 0404)  BP: 99/67 (05/24/22 0404)  SpO2: 95 % (05/24/22 0404) Vital Signs (24h Range):  Temp:  [98.4 °F (36.9 °C)-98.9 °F (37.2 °C)] 98.4 °F (36.9 °C)  Pulse:  [80-90] 80  Resp:  [5-19] 16  SpO2:  [94 %-98 %] 95 %  BP: ()/(61-85) 99/67     Weight: 48.3 kg (106 lb 7.7 oz)  Body mass index is 20.12 kg/m².    Intake/Output - Last 3 Shifts         05/22 0700  05/23 0659 05/23 0700 05/24 0659 05/24 0700 05/25 0659    P.O. 240 1284     I.V. (mL/kg) 89 (1.8)      IV Piggyback 141.5 99.8     Total Intake(mL/kg) 470.5 (9.7) 1383.8 (28.6)     Urine (mL/kg/hr) 0 (0) 2250 (1.9)     Drains 5 6     Other       Stool  0     Total Output 5 2256     Net +465.5 -872.2            Urine Occurrence 3 x 1 x     Stool Occurrence  1 x                Physical Exam:   Constitutional: She is oriented to person, place, and time. She appears well-developed and well-nourished.    HENT:   Head: Normocephalic.    Eyes: EOM are normal.     Cardiovascular:  Normal rate.             Pulmonary/Chest: Effort normal.        Abdominal: Soft. She exhibits abdominal incision (Laparoscopic incisions c/d/i). She exhibits no distension. There is abdominal tenderness (Mild tenderness near drain site, no signs of  infection). There is no rebound and no guarding.   NICOLE drain at right hip with scant serosanguinous drainage     Genitourinary:    Genitourinary Comments: Deferred. See H&P for initial exam             Musculoskeletal: Moves all extremeties.       Neurological: She is alert and oriented to person, place, and time.    Skin: Skin is warm and dry.    Psychiatric: She has a normal mood and affect. Her behavior is normal.     Lines/Drains/Airways       Drain  Duration                  Closed/Suction Drain 05/21/22 1157 Right Other (Comment) Bulb 14 Fr. 2 days              Peripheral Intravenous Line  Duration                  Peripheral IV - Single Lumen 05/22/22 2330 20 G Anterior;Right Forearm 1 day                    Laboratory:  No new labs obtained

## 2022-05-25 ENCOUNTER — TELEPHONE (OUTPATIENT)
Dept: GYNECOLOGIC ONCOLOGY | Facility: HOSPITAL | Age: 32
End: 2022-05-25
Payer: COMMERCIAL

## 2022-05-25 ENCOUNTER — PATIENT MESSAGE (OUTPATIENT)
Dept: GYNECOLOGIC ONCOLOGY | Facility: HOSPITAL | Age: 32
End: 2022-05-25
Payer: COMMERCIAL

## 2022-05-25 DIAGNOSIS — T81.40XA POSTOPERATIVE INFECTION, UNSPECIFIED TYPE, INITIAL ENCOUNTER: Primary | ICD-10-CM

## 2022-05-25 LAB
BACTERIA BLD CULT: ABNORMAL
BACTERIA SPEC ANAEROBE CULT: ABNORMAL

## 2022-05-25 RX ORDER — METRONIDAZOLE 500 MG/1
500 TABLET ORAL EVERY 12 HOURS
Qty: 28 TABLET | Refills: 0 | Status: SHIPPED | OUTPATIENT
Start: 2022-05-25 | End: 2022-06-08

## 2022-05-25 NOTE — TELEPHONE ENCOUNTER
Gyn Onc resident placed call to Mrs. Nagel in regards to results of anaerobic culture.   Unable to reach patient, voicemail left.  Prescription for Flagyl sent to patient's pharmacy.  Patient has follow up with Dr. Delacruz on 05/31    Marc Shearer M.D.  OB/GYN PGY-2

## 2022-05-28 LAB
BACTERIA BLD CULT: NORMAL
BACTERIA BLD CULT: NORMAL

## 2022-06-01 ENCOUNTER — TELEPHONE (OUTPATIENT)
Dept: GYNECOLOGIC ONCOLOGY | Facility: CLINIC | Age: 32
End: 2022-06-01
Payer: COMMERCIAL

## 2022-06-01 NOTE — TELEPHONE ENCOUNTER
"----- Message from Darlene Connor RN sent at 6/1/2022 10:56 AM CDT -----    ----- Message -----  From: Arlen Singh  Sent: 6/1/2022  10:25 AM CDT  To: [facility] Clinical Support    Patient Status: active    Scheduling Appt : post op    Time/Date Preference: first available    gAuto Active User?: yes    Relationship to Patient?: self    Contact Preference?: [phone]    Treating Provider: Dr Delacruz    Do you feel you need to be seen today? no                   Additional Notes:  "Thank you for all that you do for our patients'       "

## 2022-06-08 ENCOUNTER — TUMOR BOARD CONFERENCE (OUTPATIENT)
Dept: GYNECOLOGIC ONCOLOGY | Facility: CLINIC | Age: 32
End: 2022-06-08
Payer: COMMERCIAL

## 2022-06-08 NOTE — PROGRESS NOTES
Multidisciplinary Gynecologic Oncology Cancer Conference - Evaluation and Recommendation Summary  Patient Name: Francis Nagel  : 1990  MRN: [id number]     1. Evaluation  HPI: 32yo Stage IC2 intestinal type adenocarcinoma     s/p RA-LSO 3/10/2022     EGD/C-scope 2022     s/p RTLH/RSO/PPALND/OMX/staging biopsies/appy 2022     PET 2022     Pathology: confirmation not mucinous. No grade for type of tumor. Final pathology on completion surgery negative for residual disease.    Imaging: confirmation negative for metastatic disease      2. Treatment Recommendation    Consensus recommendation includes obtaining tumor markers (CEA, CA 19-9). Pathology send out for further clarification.   MRI A/P with/without contrast, with MRCP  Hold ACT pending further workup     Clinical trial options: none at this time.

## 2022-06-09 ENCOUNTER — OFFICE VISIT (OUTPATIENT)
Dept: GYNECOLOGIC ONCOLOGY | Facility: CLINIC | Age: 32
End: 2022-06-09
Payer: COMMERCIAL

## 2022-06-09 VITALS
WEIGHT: 102.88 LBS | HEART RATE: 85 BPM | SYSTOLIC BLOOD PRESSURE: 119 MMHG | BODY MASS INDEX: 19.44 KG/M2 | DIASTOLIC BLOOD PRESSURE: 75 MMHG

## 2022-06-09 DIAGNOSIS — C16.9: Primary | ICD-10-CM

## 2022-06-09 PROCEDURE — 99999 PR PBB SHADOW E&M-EST. PATIENT-LVL III: ICD-10-PCS | Mod: PBBFAC,,, | Performed by: OBSTETRICS & GYNECOLOGY

## 2022-06-09 PROCEDURE — 3074F PR MOST RECENT SYSTOLIC BLOOD PRESSURE < 130 MM HG: ICD-10-PCS | Mod: CPTII,S$GLB,, | Performed by: OBSTETRICS & GYNECOLOGY

## 2022-06-09 PROCEDURE — 3078F PR MOST RECENT DIASTOLIC BLOOD PRESSURE < 80 MM HG: ICD-10-PCS | Mod: CPTII,S$GLB,, | Performed by: OBSTETRICS & GYNECOLOGY

## 2022-06-09 PROCEDURE — 99024 POSTOP FOLLOW-UP VISIT: CPT | Mod: S$GLB,,, | Performed by: OBSTETRICS & GYNECOLOGY

## 2022-06-09 PROCEDURE — 3008F PR BODY MASS INDEX (BMI) DOCUMENTED: ICD-10-PCS | Mod: CPTII,S$GLB,, | Performed by: OBSTETRICS & GYNECOLOGY

## 2022-06-09 PROCEDURE — 3078F DIAST BP <80 MM HG: CPT | Mod: CPTII,S$GLB,, | Performed by: OBSTETRICS & GYNECOLOGY

## 2022-06-09 PROCEDURE — 99999 PR PBB SHADOW E&M-EST. PATIENT-LVL III: CPT | Mod: PBBFAC,,, | Performed by: OBSTETRICS & GYNECOLOGY

## 2022-06-09 PROCEDURE — 3008F BODY MASS INDEX DOCD: CPT | Mod: CPTII,S$GLB,, | Performed by: OBSTETRICS & GYNECOLOGY

## 2022-06-09 PROCEDURE — 3074F SYST BP LT 130 MM HG: CPT | Mod: CPTII,S$GLB,, | Performed by: OBSTETRICS & GYNECOLOGY

## 2022-06-09 PROCEDURE — 1159F PR MEDICATION LIST DOCUMENTED IN MEDICAL RECORD: ICD-10-PCS | Mod: CPTII,S$GLB,, | Performed by: OBSTETRICS & GYNECOLOGY

## 2022-06-09 PROCEDURE — 99024 PR POST-OP FOLLOW-UP VISIT: ICD-10-PCS | Mod: S$GLB,,, | Performed by: OBSTETRICS & GYNECOLOGY

## 2022-06-09 PROCEDURE — 1159F MED LIST DOCD IN RCRD: CPT | Mod: CPTII,S$GLB,, | Performed by: OBSTETRICS & GYNECOLOGY

## 2022-06-09 NOTE — PROGRESS NOTES
Subjective:      Patient ID: Francis Nagel is a 31 y.o. female.    Chief Complaint: Follow-up      HPI  S/p RA LSO 3/10/2022  Pathology:  THE LEFT OVARY SHOWS ADENOCARCINOMA, INTESTINAL PHENOTYPE MEASURING 15 CM,   RUPTURED.  FINAL CLASSIFICATION IS PENDING ADDITIONAL STAINS.   INITIAL IMMUNOHISTOCHEMICAL STAINS SHOW THAT THE TUMOR IS POSITIVE FOR CK7,   CK 20 AND CDX2.  TUMOR IS NEGATIVE FOR PAX 8 AND WT 1. THE CONTROLS STAIN   APPROPRIATELY.   NOTE:  Primary versus metastatic carcinoma based on morphology and   immunostain pattern. Will attempt an additional panel of stains to   subclassify. Intestinal phenotype tumors can arise in multiple locations,   most commonly the upper and lower gastrointestinal tract and occaisionally   cervix and pancreas. Correlation with endoscopy and imaging may be needed if   additional stains do not provide specificity.     Presents today for post operative visit and further treatment planning. Recovering appropriately from surgery. Up and about, eating, +BM.  Reports 9/2021 pap normal, she will obtain a copy for me.   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      EGD/Colonoscopy without obvious malignancy.  Cervical cytology negative.   PET   Impression:  No definite FDG avid tumor to suggest gastric malignancy.  Diffuse prominence of gastric uptake likely related to decompression/underdistension as well as an inflammatory component from reported gastritis.     Multifocal activity in the right adnexal region atypical appearance for physiologic ovarian uptake.  Differential considerations include physiologic ovarian activity and abnormal activity in adjacent bowel.  Recommend correlation with tumor markers and dedicated imaging of the pelvis such as ultrasound or MRI for further evaluation if clinically indicated.     Indeterminate low-grade side of FDG avidity in the subserosal aspect of  the left uterus, possibly small uterine fibroid versus misregistered focal pooling of excreted tracer within the ureter.  This could also be evaluated with pelvic imaging.  Attention on follow-up recommended.     Incidental thyroiditis.  Recommend clinical correlation.     Operative change of left salpingo-oophorectomy for left ovarian neoplasm.     Discussed completion hysterectomy/RSO/staging/any other indicated procedures for what appears to be a primary ovarian malignancy.    S/p RTLH/LSO/PPALND/OMX/peritoneal biopsies/appy 5/13/2022  Pathology is negative. Cytologic washings negative.   Post operative course complicated by pelvic abscess. S/p IR drainage and antibiotics. Resolved.    Today's visit:  Presents today for post operative visit. Recovering appropriately from surgery. Up and about, eating, +BM.  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   - will obtain CEA/ and MRI w/ MRCP for further evaluation of biliary tract given histology  - consideration of referral to medical oncology pending further work up and evaluation       Referral history:  Self referred for a second opinion regarding pelvic mass and elevated .      She has previously seen Dr. Rupa Taylor and was referred to Dr. Taylor by Dr. Michelle Ramirez.      Pelvic US 2/4/2022  Uterus: The uterus measures 9 x 5.5 x 6.3 cm in dimension.  No uterine masses appreciated.  There is a normal homogeneous uterine parenchymal echotexture.  The endometrial stripe measures 5 mm, normal for a premenopausal patient.  No fluid/blood products within the endometrial canal.     Ovaries: The right ovary is markedly enlarged measuring 15.7 x 7.9 x 11.1 cm.  The left ovary is not visualized.  The right ovary appears nearly completely replaced by a large complex  multi-septated cystic mass which shows areas with low level internal echoes.  No associated calcifications.  Benign and malignant ovarian neoplasms are possible.  Consider additional characterization of the right ovary with contrast enhanced MRI of the female pelvis.  There is normal arterial and venous color flow present in the right ovary.     CT A&P 2/15/2022  - there is a large complex pelvic mass which is centered above and above and left lateral to the uterus.  This measures 16.7 cm craniocaudal by 14 point 8 cm transversely by 12.8 cm AP.  This contains multiple cystic areas and demonstrates in prominent enhancement and vascularity in other areas.  The uterus is anteverted and compressed.  Whether this arises from the right or left ovary is uncertain.  There is early bilateral hydronephrosis.  - of note there is no ascites, omental caking or peritoneal implants     DONNY 0.53 (low risk in a premenopausal patient)     Tumor markers:    Inhibin A 10  Inhibin B 46  hcg <2.4   42 (elevated)  AFP 2.7     No prior abdominal surgeries.  x 2. Does not desire future fertility.      Denies family history of breast, ovarian, uterine or colon cancer.      Endorses some occasional pelvic discomforts. Her  was available via speaker phone during our visit.    Review of Systems   Constitutional: Negative for appetite change, chills, fatigue and fever.   HENT: Negative for mouth sores.    Respiratory: Negative for cough and shortness of breath.    Cardiovascular: Negative for leg swelling.   Gastrointestinal: Negative for abdominal pain, blood in stool, constipation and diarrhea.   Endocrine: Negative for cold intolerance.   Genitourinary: Negative for dysuria and vaginal bleeding.   Musculoskeletal: Negative for myalgias.   Skin: Negative for rash.   Allergic/Immunologic: Negative.    Neurological: Negative for weakness and numbness.   Hematological: Negative for adenopathy. Does not bruise/bleed easily.    Psychiatric/Behavioral: Negative for confusion.       Objective:   Physical Exam:   Constitutional: She is oriented to person, place, and time. She appears well-developed and well-nourished.    HENT:   Head: Normocephalic and atraumatic.    Eyes: Pupils are equal, round, and reactive to light. EOM are normal.    Neck: No thyromegaly present.    Cardiovascular: Normal rate, regular rhythm and intact distal pulses.     Pulmonary/Chest: Effort normal and breath sounds normal. No respiratory distress. She has no wheezes.        Abdominal: Soft. Bowel sounds are normal. She exhibits abdominal incision. She exhibits no distension and no mass. There is no abdominal tenderness.             Musculoskeletal: Normal range of motion and moves all extremeties.      Lymphadenopathy:     She has no cervical adenopathy.        Right: No supraclavicular adenopathy present.        Left: No supraclavicular adenopathy present.    Neurological: She is alert and oriented to person, place, and time.    Skin: Skin is warm and dry. No rash noted.    Psychiatric: She has a normal mood and affect.       Assessment:     1. Adenocarcinoma, intestinal type        Plan:     Orders Placed This Encounter   Procedures    MRI Abdomen W WO Contrast_INC MRCP    CANCER ANTIGEN 19-9    CEA     Recovering appropriately from surgery.   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   - will obtain CEA/ and MRI w/ MRCP for further evaluation of biliary tract given histology  - consideration of referral to medical oncology pending further work up and evaluation

## 2022-06-20 LAB
FINAL PATHOLOGIC DIAGNOSIS: NORMAL
FUNGUS SPEC CULT: NORMAL
GROSS: NORMAL
Lab: NORMAL
SUPPLEMENTAL DIAGNOSIS: NORMAL

## 2022-06-21 NOTE — PROGRESS NOTES
ALLERGY & IMMUNOLOGY CLINIC - INITIAL CONSULTATION      HISTORY OF PRESENT ILLNESS     Patient ID: Francis Nagel is a 31 y.o. female    CC: ocular pruritus, pain, chronic rhinitis     HPI: Francis Nagel is a 31 y.o. female presenting for ocular pruritus, pain around the eyes and chronic rhinitis.    She has gotten the ocular pruritus on and off for about a year. Prior to that, ocular pruritus was helped by zyrtec. 3 days ago, she started getting pain all around the eye. The pain is not of the eye itself. She has difficulty describing the pain. She says it doesn't feel like sinus pressure. A little burning of the eye. It doesn't hurt when she moves her eyes. No photosensitivity. Vision maybe blurry. The itchiness of the eyes is worse over the past 3 days. No eye discharge.  Patient endorses congestion, sneezing, rhinorrhea, nasal pruritus (a little), ocular pruritus. Rhinitis has also been worse over the past 3 days. She also gets sore throat.   Patient denies post nasal drip, cough.  No seasonality. Symptoms not every day, but maybe every couple of days.  There is no difference between indoors and outdoors.   The patient has not identified any triggers.  The patient denies anosmia.  The patient denies heartburn/reflux symptoms.    She has alaway (ketotifen) eye drops, which help somewhat with the itching. She has never tried any nasal sprays. She takes zyrtec daily which helps somewhat. She has not tried other eye drops.    She was getting hives, but it has not happened for a while. Zyrtec helped.     REVIEW OF SYSTEMS     CONST: no F/C, + NS, no unexplained weight changes  EYES: + blurry vision, no discharge, + pruritus  NOSE: + congestion, + rhinorrhea, + sneezing, no anosmia  PULM: no SOB, no wheezing, no cough  DERM: no recent rashes, no skin breaks     MEDICAL HISTORY     Vaccines:   Immunization History   Administered Date(s) Administered    COVID-19, MRNA, LN-S, PF (Pfizer) (Purple Cap) 04/17/2021,  05/08/2021       MedHx:   Patient Active Problem List   Diagnosis    Pelvic mass    Elevated CA-125    Hyponatremia    S/P unilateral salpingo-oophorectomy/mass resection    Anemia    Metastatic cancer    S/P RA-TLH/USO/pelvic and para aortic LN/OMX/appendectomy/peritoneal biopsies    Post op infection       SurgHx:   Past Surgical History:   Procedure Laterality Date    COLONOSCOPY N/A 4/11/2022    Procedure: COLONOSCOPY;  Surgeon: Eric Meléndez Jr., MD;  Location: [facility];  Service: Endoscopy;  Laterality: N/A;    ESOPHAGOGASTRODUODENOSCOPY N/A 4/11/2022    Procedure: EGD (ESOPHAGOGASTRODUODENOSCOPY);  Surgeon: Eric Meléndez Jr., MD;  Location: [facility];  Service: Endoscopy;  Laterality: N/A;    ROBOT-ASSISTED APPENDECTOMY  5/13/2022    Procedure: ROBOTIC APPENDECTOMY;  Surgeon: Charmaine Delacruz MD;  Location: [facility];  Service: OB/GYN;;    ROBOT-ASSISTED LAPAROSCOPIC ABDOMINAL HYSTERECTOMY USING DA DARVIN XI N/A 5/13/2022    Procedure: XI ROBOTIC HYSTERECTOMY;  Surgeon: Charmaine Delacruz MD;  Location: [facility];  Service: OB/GYN;  Laterality: N/A;    ROBOT-ASSISTED LAPAROSCOPIC OMENTECTOMY USING DA DARVIN XI N/A 5/13/2022    Procedure: XI ROBOTIC OMENTECTOMY;  Surgeon: Charmaine Delacruz MD;  Location: [facility];  Service: OB/GYN;  Laterality: N/A;    ROBOT-ASSISTED LAPAROSCOPIC SALPINGO-OOPHORECTOMY USING DA DARVIN XI Left 3/10/2022    Procedure: XI ROBOTIC SALPINGO-OOPHORECTOMY/ USO;  Surgeon: Charmaine Delacruz MD;  Location: [address];  Service: OB/GYN;  Laterality: Left;    ROBOT-ASSISTED LAPAROSCOPIC SURGICAL REMOVAL OF OVARY USING DA DARVIN XI Right 5/13/2022    Procedure: XI ROBOTIC OOPHORECTOMY;  Surgeon: Charmaine Delacruz MD;  Location: [facility];  Service: OB/GYN;  Laterality: Right;    ROBOT-ASSISTED SURGICAL REMOVAL OF FALLOPIAN TUBE USING DA DARIVN XI Bilateral 5/13/2022    Procedure: XI ROBOTIC SALPINGECTOMY;  Surgeon: Charmaine Delacruz MD;  Location: [facility];  Service: OB/GYN;   Laterality: Bilateral;       Medications:   Current Outpatient Medications on File Prior to Visit   Medication Sig Dispense Refill    cetirizine (ZYRTEC) 10 MG tablet Take by mouth once daily.      fexofenadine (ALLEGRA) 180 MG tablet Take 180 mg by mouth once daily.      acetaminophen (TYLENOL) 650 MG TbSR Take 1 tablet (650 mg total) by mouth every 6 to 8 hours as needed (Pain). Alternate every 3 hours with ibuprofen. (Patient not taking: Reported on 6/22/2022) 60 tablet 1    HYDROcodone-acetaminophen (NORCO) 5-325 mg per tablet Take 1 tablet by mouth every 6 (six) hours as needed for Pain. (Patient not taking: Reported on 6/22/2022) 20 tablet 0    ibuprofen (ADVIL,MOTRIN) 600 MG tablet Take 1 tablet (600 mg total) by mouth 3 (three) times daily. (Patient not taking: No sig reported) 30 tablet 2    ketotifen (ZADITOR) 0.025 % (0.035 %) ophthalmic solution Place 1 drop into both eyes 3 (three) times daily.      ondansetron (ZOFRAN-ODT) 4 MG TbDL Take 1 tablet (4 mg total) by mouth every 6 (six) hours as needed (nausea). (Patient not taking: No sig reported) 12 tablet 0     Current Facility-Administered Medications on File Prior to Visit   Medication Dose Route Frequency Provider Last Rate Last Admin    [COMPLETED] gadobutroL (GADAVIST) injection 5 mL  5 mL Intravenous ONCE PRN Charmaine Delacruz MD   5 mL at 06/22/22 0914       H/o Asthma: denies  H/o Eczema: denies  H/o Rhinitis: endorses    Drug Allergies: Review of patient's allergies indicates:  No Known Allergies     Env/Occ:   Pets: no    Infection Hx: Denies frequent infections requiring antibiotics.    SocHx:   Social History     Tobacco Use    Smoking status: Never Smoker    Smokeless tobacco: Never Used   Substance Use Topics    Alcohol use: Yes     Comment: twice a month    Drug use: Never       FamHx:   Asthma: no  Allergic rhinitis: mom  Family History   Problem Relation Age of Onset    Breast cancer Neg Hx     Colon cancer Neg Hx      "Ovarian cancer Neg Hx         PHYSICAL EXAM     VS: /79   Pulse 83   Ht 5' 1" (1.549 m)   Wt 46.7 kg (102 lb 15.3 oz)   LMP 03/12/2022   SpO2 100%   BMI 19.45 kg/m²   GENERAL: Alert, NAD, well-appearing, cooperative  EYES: EOMI, no conjunctival injection, no discharge, no infraorbital shiners  EARS: external auditory canals normal B/L, TM normal B/L  NOSE: NT 3 + B/L, + stringing mucous, no polyps visualized  ORAL: MMM, no ulcers, no thrush, no cobblestoning  NECK: no thyromegaly, no LAD  LUNGS: CTAB, no w/r/c, no increased WOB  HEART: RRR, normal S1/S2, no m/g/r  EXTREMITIES: No LE edema  DERM: no rashes, no skin breaks  NEURO: normal speech, normal gait, no facial asymmetry     LABORATORY STUDIES     Component      Latest Ref Rng & Units 5/23/2022   WBC      3.90 - 12.70 K/uL 5.87   RBC      4.00 - 5.40 M/uL 2.95 (L)   Hemoglobin      12.0 - 16.0 g/dL 9.1 (L)   Hematocrit      37.0 - 48.5 % 27.3 (L)   MCV      82 - 98 fL 93   MCH      27.0 - 31.0 pg 30.8   MCHC      32.0 - 36.0 g/dL 33.3   RDW      11.5 - 14.5 % 11.7   Platelets      150 - 450 K/uL 299   MPV      9.2 - 12.9 fL 8.9 (L)   Immature Granulocytes      0.0 - 0.5 % 0.3   Gran # (ANC)      1.8 - 7.7 K/uL 3.6   Immature Grans (Abs)      0.00 - 0.04 K/uL 0.02   Lymph #      1.0 - 4.8 K/uL 1.6   Mono #      0.3 - 1.0 K/uL 0.5   Eos #      0.0 - 0.5 K/uL 0.2   Baso #      0.00 - 0.20 K/uL 0.01   Differential Method       Automated   Sodium      136 - 145 mmol/L 140   Potassium      3.5 - 5.1 mmol/L 3.6   Chloride      95 - 110 mmol/L 103   CO2      23 - 29 mmol/L 29   Glucose      70 - 110 mg/dL 86   BUN      6 - 20 mg/dL 5 (L)   Creatinine      0.5 - 1.4 mg/dL 0.5   Calcium      8.7 - 10.5 mg/dL 9.2   CRP      0.0 - 8.2 mg/L 187.1 (H)        ALLERGEN TESTING     Immunocaps: ordered     IMAGING & OTHER DIAGNOSTICS     CXR 5/20/22:  FINDINGS:  Trace pneumoperitoneum seen in the medial right hemidiaphragm.  Air in the soft tissues of the upper " abdominal wall bilaterally in the flanks.  No consolidation, pleural effusion or pneumothorax.  Cardiomediastinal silhouette is unremarkable.  Impression:  No acute findings in the chest.  Trace pneumoperitoneum seen in the medial right hemidiaphragm and air in the soft tissues of the upper abdominal wall bilaterally in the flanks.  Findings may be related to recent abdominal surgery May 13, 2022 but suggest appropriate clinical correlation with physical exam findings of the abdomen.     CHART REVIEW     Reviewed pcp note, gyn onc note, labs, imaging.     ASSESSMENT & PLAN     Francis Nagel is a 31 y.o. female with     # Ocular symptoms: Patient has chronic ocular pruritus on and off, but for the past 3 days, has also been getting pain around the eyes and she endorses some blurry vision. Explained that the pruritus might be due to allergy, but will refer to ophthalmology for the pain and blurry vision. Alaway eye drops help with the pruritus somewhat.  -immunocaps for aeroallergens ordered  -referring to ophtho   -try switching from alaway eye drops to pataday eye drops to see if it helps more  -start flonase as below    # Rhinitis: Congestion, sneezing, rhinitis. No seasonality, bothersome every couple of days. Has been more bothersome in past 3 days.  -immunocaps for aeroallergens ordered  -continue daily zyrtec  -start flonase 1-2 SEN BID    Follow up: 6 weeks      Vira Benedict MD  Allergy/Immunology

## 2022-06-22 ENCOUNTER — TELEPHONE (OUTPATIENT)
Dept: GYNECOLOGIC ONCOLOGY | Facility: CLINIC | Age: 32
End: 2022-06-22
Payer: COMMERCIAL

## 2022-06-22 ENCOUNTER — OFFICE VISIT (OUTPATIENT)
Dept: ALLERGY | Facility: CLINIC | Age: 32
End: 2022-06-22
Payer: COMMERCIAL

## 2022-06-22 ENCOUNTER — HOSPITAL ENCOUNTER (OUTPATIENT)
Dept: RADIOLOGY | Facility: HOSPITAL | Age: 32
Discharge: HOME | End: 2022-06-22
Attending: OBSTETRICS & GYNECOLOGY
Payer: COMMERCIAL

## 2022-06-22 ENCOUNTER — PATIENT MESSAGE (OUTPATIENT)
Dept: GYNECOLOGIC ONCOLOGY | Facility: CLINIC | Age: 32
End: 2022-06-22
Payer: COMMERCIAL

## 2022-06-22 ENCOUNTER — TELEPHONE (OUTPATIENT)
Dept: HEMATOLOGY/ONCOLOGY | Facility: CLINIC | Age: 32
End: 2022-06-22
Payer: COMMERCIAL

## 2022-06-22 VITALS
HEART RATE: 83 BPM | SYSTOLIC BLOOD PRESSURE: 124 MMHG | WEIGHT: 102.94 LBS | OXYGEN SATURATION: 100 % | DIASTOLIC BLOOD PRESSURE: 79 MMHG | BODY MASS INDEX: 19.43 KG/M2 | HEIGHT: 61 IN

## 2022-06-22 DIAGNOSIS — C79.9 METASTATIC MALIGNANT NEOPLASM, UNSPECIFIED SITE: ICD-10-CM

## 2022-06-22 DIAGNOSIS — H57.13 EYE PAIN, BILATERAL: ICD-10-CM

## 2022-06-22 DIAGNOSIS — C16.9: ICD-10-CM

## 2022-06-22 DIAGNOSIS — R19.00 PELVIC MASS: ICD-10-CM

## 2022-06-22 DIAGNOSIS — H53.8 BLURRY VISION: ICD-10-CM

## 2022-06-22 DIAGNOSIS — H57.9 OCULAR PRURITUS: Primary | ICD-10-CM

## 2022-06-22 DIAGNOSIS — Z90.710 STATUS POST HYSTERECTOMY: Primary | ICD-10-CM

## 2022-06-22 DIAGNOSIS — J31.0 CHRONIC RHINITIS: ICD-10-CM

## 2022-06-22 PROCEDURE — 3078F PR MOST RECENT DIASTOLIC BLOOD PRESSURE < 80 MM HG: ICD-10-PCS | Mod: CPTII,S$GLB,, | Performed by: STUDENT IN AN ORGANIZED HEALTH CARE EDUCATION/TRAINING PROGRAM

## 2022-06-22 PROCEDURE — 1111F DSCHRG MED/CURRENT MED MERGE: CPT | Mod: CPTII,S$GLB,, | Performed by: STUDENT IN AN ORGANIZED HEALTH CARE EDUCATION/TRAINING PROGRAM

## 2022-06-22 PROCEDURE — 1160F RVW MEDS BY RX/DR IN RCRD: CPT | Mod: CPTII,S$GLB,, | Performed by: STUDENT IN AN ORGANIZED HEALTH CARE EDUCATION/TRAINING PROGRAM

## 2022-06-22 PROCEDURE — 1159F PR MEDICATION LIST DOCUMENTED IN MEDICAL RECORD: ICD-10-PCS | Mod: CPTII,S$GLB,, | Performed by: STUDENT IN AN ORGANIZED HEALTH CARE EDUCATION/TRAINING PROGRAM

## 2022-06-22 PROCEDURE — 3008F PR BODY MASS INDEX (BMI) DOCUMENTED: ICD-10-PCS | Mod: CPTII,S$GLB,, | Performed by: STUDENT IN AN ORGANIZED HEALTH CARE EDUCATION/TRAINING PROGRAM

## 2022-06-22 PROCEDURE — 3074F PR MOST RECENT SYSTOLIC BLOOD PRESSURE < 130 MM HG: ICD-10-PCS | Mod: CPTII,S$GLB,, | Performed by: STUDENT IN AN ORGANIZED HEALTH CARE EDUCATION/TRAINING PROGRAM

## 2022-06-22 PROCEDURE — 74183 MRI ABDOMEN WITH AND WO_INC MRCP: ICD-10-PCS | Mod: 26,,, | Performed by: RADIOLOGY

## 2022-06-22 PROCEDURE — A9585 GADOBUTROL INJECTION: HCPCS | Mod: PO | Performed by: OBSTETRICS & GYNECOLOGY

## 2022-06-22 PROCEDURE — 3008F BODY MASS INDEX DOCD: CPT | Mod: CPTII,S$GLB,, | Performed by: STUDENT IN AN ORGANIZED HEALTH CARE EDUCATION/TRAINING PROGRAM

## 2022-06-22 PROCEDURE — 99204 OFFICE O/P NEW MOD 45 MIN: CPT | Mod: S$GLB,,, | Performed by: STUDENT IN AN ORGANIZED HEALTH CARE EDUCATION/TRAINING PROGRAM

## 2022-06-22 PROCEDURE — 99999 PR PBB SHADOW E&M-EST. PATIENT-LVL IV: CPT | Mod: PBBFAC,,, | Performed by: STUDENT IN AN ORGANIZED HEALTH CARE EDUCATION/TRAINING PROGRAM

## 2022-06-22 PROCEDURE — 99999 PR PBB SHADOW E&M-EST. PATIENT-LVL IV: ICD-10-PCS | Mod: PBBFAC,,, | Performed by: STUDENT IN AN ORGANIZED HEALTH CARE EDUCATION/TRAINING PROGRAM

## 2022-06-22 PROCEDURE — 1111F PR DISCHARGE MEDS RECONCILED W/ CURRENT OUTPATIENT MED LIST: ICD-10-PCS | Mod: CPTII,S$GLB,, | Performed by: STUDENT IN AN ORGANIZED HEALTH CARE EDUCATION/TRAINING PROGRAM

## 2022-06-22 PROCEDURE — 1160F PR REVIEW ALL MEDS BY PRESCRIBER/CLIN PHARMACIST DOCUMENTED: ICD-10-PCS | Mod: CPTII,S$GLB,, | Performed by: STUDENT IN AN ORGANIZED HEALTH CARE EDUCATION/TRAINING PROGRAM

## 2022-06-22 PROCEDURE — 74183 MRI ABD W/O CNTR FLWD CNTR: CPT | Mod: 26,,, | Performed by: RADIOLOGY

## 2022-06-22 PROCEDURE — 3078F DIAST BP <80 MM HG: CPT | Mod: CPTII,S$GLB,, | Performed by: STUDENT IN AN ORGANIZED HEALTH CARE EDUCATION/TRAINING PROGRAM

## 2022-06-22 PROCEDURE — 76376 3D RENDER W/INTRP POSTPROCES: CPT | Mod: 26,,, | Performed by: RADIOLOGY

## 2022-06-22 PROCEDURE — 76376 MRI ABDOMEN WITH AND WO_INC MRCP: ICD-10-PCS | Mod: 26,,, | Performed by: RADIOLOGY

## 2022-06-22 PROCEDURE — 25500020 PHARM REV CODE 255: Mod: PO | Performed by: OBSTETRICS & GYNECOLOGY

## 2022-06-22 PROCEDURE — 1159F MED LIST DOCD IN RCRD: CPT | Mod: CPTII,S$GLB,, | Performed by: STUDENT IN AN ORGANIZED HEALTH CARE EDUCATION/TRAINING PROGRAM

## 2022-06-22 PROCEDURE — 74183 MRI ABD W/O CNTR FLWD CNTR: CPT | Mod: TC,PO

## 2022-06-22 PROCEDURE — 99204 PR OFFICE/OUTPT VISIT, NEW, LEVL IV, 45-59 MIN: ICD-10-PCS | Mod: S$GLB,,, | Performed by: STUDENT IN AN ORGANIZED HEALTH CARE EDUCATION/TRAINING PROGRAM

## 2022-06-22 PROCEDURE — 3074F SYST BP LT 130 MM HG: CPT | Mod: CPTII,S$GLB,, | Performed by: STUDENT IN AN ORGANIZED HEALTH CARE EDUCATION/TRAINING PROGRAM

## 2022-06-22 RX ORDER — KETOTIFEN FUMARATE 0.35 MG/ML
1 SOLUTION/ DROPS OPHTHALMIC 3 TIMES DAILY
COMMUNITY
End: 2023-03-14

## 2022-06-22 RX ORDER — OLOPATADINE HYDROCHLORIDE 2 MG/ML
1 SOLUTION/ DROPS OPHTHALMIC DAILY
Qty: 2.5 ML | Refills: 5 | Status: SHIPPED | OUTPATIENT
Start: 2022-06-22 | End: 2023-03-14

## 2022-06-22 RX ORDER — FLUTICASONE PROPIONATE 50 MCG
SPRAY, SUSPENSION (ML) NASAL
Qty: 16 G | Refills: 11 | Status: SHIPPED | OUTPATIENT
Start: 2022-06-22 | End: 2022-06-29

## 2022-06-22 RX ORDER — CETIRIZINE HYDROCHLORIDE 10 MG/1
TABLET ORAL DAILY
COMMUNITY

## 2022-06-22 RX ADMIN — GADOBUTROL 5 ML: 604.72 INJECTION INTRAVENOUS at 09:06

## 2022-06-22 NOTE — NURSING
As per Dr. Rao's request, patient has been scheduled for PET scan, 6/27.  Information has been provided to Dr. Echols's office to schedule an EUS.

## 2022-06-22 NOTE — TELEPHONE ENCOUNTER
"Spoke with patient.   MRI was unremarkable.   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   Dr. 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     She voiced understanding. Questions answered.     ----- Message from Aspen Kapadia MA sent at 6/22/2022  2:36 PM CDT -----  Pt returning your call  ----- Message -----  From: Corby Che  Sent: 6/22/2022   1:55 PM CDT  To: Jayme Montano Staff    Consult/Advisory:          Name Of Caller: Self      Contact Preference?: [phone]          Provider Name: Jayme      Does patient feel the need to be seen today? No      What is the nature of the call?: Returning call to office.          Additional Notes:  "Thank you for all that you do for our patients"           "

## 2022-06-22 NOTE — NURSING
Spoke with patient to confirm PET scan date, time and location.  Provided name of KHURRAM Fountain, regarding the EUS scheduling.  Patient verbalized understanding.

## 2022-06-22 NOTE — TELEPHONE ENCOUNTER
Called to review MRI which was unremarkable.   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     No answer. Left voicemail.

## 2022-06-23 ENCOUNTER — PATIENT MESSAGE (OUTPATIENT)
Dept: HEMATOLOGY/ONCOLOGY | Facility: CLINIC | Age: 32
End: 2022-06-23
Payer: COMMERCIAL

## 2022-06-23 ENCOUNTER — TELEPHONE (OUTPATIENT)
Dept: HEMATOLOGY/ONCOLOGY | Facility: CLINIC | Age: 32
End: 2022-06-23
Payer: COMMERCIAL

## 2022-06-23 NOTE — TELEPHONE ENCOUNTER
Sure we can see her first. 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 Plan was to do EUS to see if something is in the pancreas. So I reached out to Dr. Echols to help with this,   We can see her first, discuss it and then see what she wants to do,. Please have Dr. Echols hold off as well. Let Yissel know

## 2022-06-23 NOTE — TELEPHONE ENCOUNTER
----- Message from Alejandra Rao MD sent at 6/23/2022  6:02 PM CDT -----  Regarding: RE: opinion  She wants to hold off on everything for now. I will se her and discuss   ----- Message -----  From: Alejandra Rao MD  Sent: 6/22/2022  12:39 PM CDT  To: Charmaine Delacruz MD, Alejandra Rao MD, #  Subject: RE: opinion                                      The path is definitely concerning. Agree with EUS. She lives in [place] so I will get her in [place]. I will have Flakopaula Echols do an EUS, but I will probabaly treat her adjuvant   I am attaching [facility] navigator on this and my nurse  I will geta  repeat PET scan as well  Ting, Can you schedule PET and then EUS with Dr. Echols please. I will text him as well   ----- Message -----  From: Chacorta Gupta MD  Sent: 6/22/2022  12:28 PM CDT  To: Charmaine Delacruz MD, Alejandra Rao MD  Subject: RE: opinion                                      probably EUS of her pancreas for completeness, have the path reviewed and if everything stays as is, treat as metastatic dz of unknown primary?  Alejandra will know what to do      ----- Message -----  From: Charmaine Delacruz MD  Sent: 6/22/2022  10:12 AM CDT  To: Alejandra Rao MD, Chacorta Gupta MD  Subject: opinion                                          Hi there--  When you all have a free moment can you please call me about this patient?   I'm wondering if she may benefit from a referral to you all.   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 Also reviewed at [place] and same read.   She's had a complete hyst with staging (nodes, etc) from a gyn standpoint and everything negative besides the ovary.   She's had  a PET  She's had a EGD/c-scope  She's had an MRI with ERCP  Nothing revealing.   Thank you,  Charmaine   My cell [phone]

## 2022-06-24 ENCOUNTER — TELEPHONE (OUTPATIENT)
Dept: HEMATOLOGY/ONCOLOGY | Facility: CLINIC | Age: 32
End: 2022-06-24
Payer: COMMERCIAL

## 2022-06-24 NOTE — NURSING
Reached out to patient to offer a sooner appt with one of our other oncologist here at [facility]. Pt very agreeable and accepted an appt with Dr. Hou on Wednesday, June 29th at 10 am.  Date, time and location confirmed. Provided patient with callback number.         Oncology Navigation   Intake  Date of Diagnosis: 3/10/2022  Cancer Type: GI; Gynecologic  Internal / External Referral: Internal  Referral Source: Michelle Ramirez  Date of Referral: 2/15/2022  Initial Nurse Navigator Contact: 6/24/2022  Referral to Initial Contact Timeline (days): 0  Date Worked: 6/24/2022  First Appointment Available: 6/29/2022  Appointment Date: 6/29/2022  First Available Date vs. Scheduled Date (days): 0  Multiple appointments: No     Treatment  Current Status: Staging work-up  Pending: Other  Date Presented to Tumor Board: 6/8/2022  Presentation Notes: Consensus recommendation includes obtaining tumor markers (CEA, CA 19-9). Pathology send out for further clarification.   MRI A/P with/without contrast, with MRCP  Hold ACT pending further workup     Clinical trial options: none at this time.    Surgery: Completed  Surgical Oncologist: Dr. Charmaine Delacruz  Type of Surgery: saplingo-oophorectomy  Surgery Schedule Date: 3/10/2022          Procedures: PET scan; MRI; EUS / EGD; Biopsy  Biopsy Schedule Date: 3/10/2022 ([place])  EUS / EGD: EGD  EUS / EGD Date: 4/11/2022  MRI Schedule Date: 6/9/2022  PET Scan Schedule Date: 4/13/2022                 Acuity  Surgical Procedure Complexity: 1  Comorbidities in Medical History: 0  Hospitalization Within the Past Month: 0  Navigation Acuity: 1     Follow Up  No follow-ups on file.

## 2022-06-29 ENCOUNTER — OFFICE VISIT (OUTPATIENT)
Dept: HEMATOLOGY/ONCOLOGY | Facility: CLINIC | Age: 32
End: 2022-06-29
Payer: COMMERCIAL

## 2022-06-29 ENCOUNTER — PATIENT MESSAGE (OUTPATIENT)
Dept: HEMATOLOGY/ONCOLOGY | Facility: CLINIC | Age: 32
End: 2022-06-29

## 2022-06-29 VITALS
SYSTOLIC BLOOD PRESSURE: 110 MMHG | TEMPERATURE: 98 F | DIASTOLIC BLOOD PRESSURE: 70 MMHG | OXYGEN SATURATION: 100 % | BODY MASS INDEX: 19.35 KG/M2 | WEIGHT: 102.5 LBS | HEIGHT: 61 IN | HEART RATE: 81 BPM

## 2022-06-29 DIAGNOSIS — D64.9 NORMOCYTIC ANEMIA: ICD-10-CM

## 2022-06-29 DIAGNOSIS — C79.9 METASTATIC MALIGNANT NEOPLASM, UNSPECIFIED SITE: Primary | ICD-10-CM

## 2022-06-29 PROCEDURE — 3078F PR MOST RECENT DIASTOLIC BLOOD PRESSURE < 80 MM HG: ICD-10-PCS | Mod: CPTII,S$GLB,, | Performed by: INTERNAL MEDICINE

## 2022-06-29 PROCEDURE — 3074F PR MOST RECENT SYSTOLIC BLOOD PRESSURE < 130 MM HG: ICD-10-PCS | Mod: CPTII,S$GLB,, | Performed by: INTERNAL MEDICINE

## 2022-06-29 PROCEDURE — 3074F SYST BP LT 130 MM HG: CPT | Mod: CPTII,S$GLB,, | Performed by: INTERNAL MEDICINE

## 2022-06-29 PROCEDURE — 3008F BODY MASS INDEX DOCD: CPT | Mod: CPTII,S$GLB,, | Performed by: INTERNAL MEDICINE

## 2022-06-29 PROCEDURE — 1159F MED LIST DOCD IN RCRD: CPT | Mod: CPTII,S$GLB,, | Performed by: INTERNAL MEDICINE

## 2022-06-29 PROCEDURE — 99205 PR OFFICE/OUTPT VISIT, NEW, LEVL V, 60-74 MIN: ICD-10-PCS | Mod: S$GLB,,, | Performed by: INTERNAL MEDICINE

## 2022-06-29 PROCEDURE — 99205 OFFICE O/P NEW HI 60 MIN: CPT | Mod: S$GLB,,, | Performed by: INTERNAL MEDICINE

## 2022-06-29 PROCEDURE — 1159F PR MEDICATION LIST DOCUMENTED IN MEDICAL RECORD: ICD-10-PCS | Mod: CPTII,S$GLB,, | Performed by: INTERNAL MEDICINE

## 2022-06-29 PROCEDURE — 99999 PR PBB SHADOW E&M-EST. PATIENT-LVL IV: ICD-10-PCS | Mod: PBBFAC,,, | Performed by: INTERNAL MEDICINE

## 2022-06-29 PROCEDURE — 3008F PR BODY MASS INDEX (BMI) DOCUMENTED: ICD-10-PCS | Mod: CPTII,S$GLB,, | Performed by: INTERNAL MEDICINE

## 2022-06-29 PROCEDURE — 3078F DIAST BP <80 MM HG: CPT | Mod: CPTII,S$GLB,, | Performed by: INTERNAL MEDICINE

## 2022-06-29 PROCEDURE — 99999 PR PBB SHADOW E&M-EST. PATIENT-LVL IV: CPT | Mod: PBBFAC,,, | Performed by: INTERNAL MEDICINE

## 2022-06-29 NOTE — PROGRESS NOTES
PATIENT: Francis Nagel  MRN: [id number]  DATE: 6/29/2022      Diagnosis:   1. Metastatic malignant neoplasm, unspecified site    2. Normocytic anemia        Chief Complaint: Establish Care (Metastatic Adenocarcinoma)      Oncologic History:      Oncologic History 2/04/22 US Pelvis  2/15/22 CT Abdomen and Pelvis  3/10/22 robotic salpingo oophorectomy of the left ovary  4/11/22 EGD Colonoscopy  4/21/22 PET/CT  5/13/22 robotic assisted total laparoscopic hysterectomy, right salpingo oophorectomy, bilateral pelvic and periaortic lymph node dissection, omentectomy, peritoneal biopsies, and appendectomy    Oncologic Treatment     Pathology 3/10/22 adenocarcinoma intestinal phenotype CK7+, 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  5/13/22  no sign of malignancy and 16 negative lymph nodes          Subjective:    Initial History: Ms. Nagel is a 31 y.o. female with anemia who presents for work up of metastatic cancer.  The patient initially presented to see Dr Johnson on 2/03/22 with complaint of pelvic pain.  US of the pelvis on 2/04/22 showed a 15.7 x 7.9 x 11.1 complex multi-septated cystic right ovarian mass replacing the entire right ovary.  CT of the abdomen and pelvis on 02/15/2022 showed a large complex pelvic mass centered above an left lateral to the uterus measuring 16.7 cm in greatest dimension.  The patient then saw gynecologist oncologist Dr. Cintron on 02/16/2022.  She then saw Dr. Delacruz on 02/21/2022.  The patient then underwent robotic salpingo oophorectomy of the left ovary under the care of Dr. 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  The patient then underwent EGD and colonoscopy on 04/11/2022 showing gastritis, antritis, and internal hemorrhoids.  PET-CT performed 04/21/2022 showed mildly increased uptake in the thyroid g gland bilaterally in keeping with thyroiditis; multifocal activity in the right adnexal region with maximum SUV of 6.6 atypical  for physiologic ovarian uptake in a premenopausal woman; punctate area of low-grade FDG activity localized to the left subserosal aspect of the uterus; diffusely increased uptake in the gastric cardia and body without discrete nodularity or mass.  The patient then underwent robotic assisted total laparoscopic hysterectomy, right salpingo oophorectomy, bilateral pelvic and periaortic lymph node dissection, omentectomy, peritoneal biopsies, and appendectomy under the care of Dr. Delacruz on 05/13/2022 with pathology showing no sign of malignancy and 16 negative lymph nodes.  The patient was then admitted from [address] [place] [address] for postoperative pelvic abscess with placement of peritoneal drain.  Cultures grew out Klebsiella with sensitivities to Bactrim.  Patient was discussed at tumor Board on 06/08/2022 with recommendations to check CEA and CA 19 9, obtained MRI abdomen and pelvis with MRCP and hold adjuvant chemotherapy pending further workup.  The patient underwent MRCP on 06/22/2022 showing 11 mm subcapsular mass in segment 3 of the liver concerning for benign cavernous hemangioma; similar lesion in segment 4A measuring 6 mm; stable with respect of prior scan on 02/15/2022.  MRCP sequence a demonstrated normal hepatic biliary structures with normal pancreatic duct.    Currently the patient complains of occasional dysuria as well as occasional headaches which she attributes to allergies.  The patient denies CP, cough, SOB, abdominal pain, N/V, constipation, diarrhea.  The patient denies fever, chills, night sweats, weight loss, new lumps or bumps, easy bruising or bleeding.     Past Medical History:   Past Medical History:   Diagnosis Date    Anemia 03/11/2022    Encounter for blood transfusion 03/11/2022    Mass of left ovary 03/10/2022    JOSE MIGUEL.     LEFT OVARY SHOWS ADENOCARCINOMA, INTESTINAL PHENOTYPE MEASURING 15 CM    Metastatic cancer 4/11/2022    PONV (postoperative nausea and vomiting)     S/P unilateral  salpingo-oophorectomy/mass resection 03/10/2022       Past Surgical HIstory:   Past Surgical History:   Procedure Laterality Date    COLONOSCOPY N/A 4/11/2022    Procedure: COLONOSCOPY;  Surgeon: Eric Meléndez Jr., MD;  Location: [facility];  Service: Endoscopy;  Laterality: N/A;    ESOPHAGOGASTRODUODENOSCOPY N/A 4/11/2022    Procedure: EGD (ESOPHAGOGASTRODUODENOSCOPY);  Surgeon: Eric Meléndez Jr., MD;  Location: [facility];  Service: Endoscopy;  Laterality: N/A;    ROBOT-ASSISTED APPENDECTOMY  5/13/2022    Procedure: ROBOTIC APPENDECTOMY;  Surgeon: Charmaine Delacruz MD;  Location: [facility];  Service: OB/GYN;;    ROBOT-ASSISTED LAPAROSCOPIC ABDOMINAL HYSTERECTOMY USING DA DARVIN XI N/A 5/13/2022    Procedure: XI ROBOTIC HYSTERECTOMY;  Surgeon: Charmaine Delacruz MD;  Location: [facility];  Service: OB/GYN;  Laterality: N/A;    ROBOT-ASSISTED LAPAROSCOPIC OMENTECTOMY USING DA DARVIN XI N/A 5/13/2022    Procedure: XI ROBOTIC OMENTECTOMY;  Surgeon: Charmaine Delacruz MD;  Location: [facility];  Service: OB/GYN;  Laterality: N/A;    ROBOT-ASSISTED LAPAROSCOPIC SALPINGO-OOPHORECTOMY USING DA DARVIN XI Left 3/10/2022    Procedure: XI ROBOTIC SALPINGO-OOPHORECTOMY/ USO;  Surgeon: Charmaine Delacruz MD;  Location: [address];  Service: OB/GYN;  Laterality: Left;    ROBOT-ASSISTED LAPAROSCOPIC SURGICAL REMOVAL OF OVARY USING DA DARVIN XI Right 5/13/2022    Procedure: XI ROBOTIC OOPHORECTOMY;  Surgeon: Charmaine Delacruz MD;  Location: [facility];  Service: OB/GYN;  Laterality: Right;    ROBOT-ASSISTED SURGICAL REMOVAL OF FALLOPIAN TUBE USING DA DARVIN XI Bilateral 5/13/2022    Procedure: XI ROBOTIC SALPINGECTOMY;  Surgeon: Charmaine Delacruz MD;  Location: [facility];  Service: OB/GYN;  Laterality: Bilateral;       Family History:   Family History   Problem Relation Age of Onset    Breast cancer Maternal Aunt     Lung cancer Paternal Uncle     Colon cancer Neg Hx     Ovarian cancer Neg Hx        Social History:  reports that she has  never smoked. She has never used smokeless tobacco. She reports current alcohol use. She reports that she does not use drugs.    Allergies:  Review of patient's allergies indicates:  No Known Allergies    Medications:  Current Outpatient Medications   Medication Sig Dispense Refill    cetirizine (ZYRTEC) 10 MG tablet Take by mouth once daily.      ketotifen (ZADITOR) 0.025 % (0.035 %) ophthalmic solution Place 1 drop into both eyes 3 (three) times daily.      olopatadine (PATADAY) 0.2 % Drop Place 1 drop into both eyes once daily. 2.5 mL 5    ondansetron (ZOFRAN-ODT) 4 MG TbDL Take 1 tablet (4 mg total) by mouth every 6 (six) hours as needed (nausea). 12 tablet 0     No current facility-administered medications for this visit.       Review of Systems   Constitutional: Negative for appetite change, chills, fatigue, fever and unexpected weight change.   HENT: Negative for mouth sores, sore throat and trouble swallowing.    Eyes: Negative for photophobia, pain and visual disturbance.   Respiratory: Negative for cough, chest tightness and shortness of breath.    Cardiovascular: Negative for chest pain, palpitations and leg swelling.   Gastrointestinal: Negative for abdominal pain, constipation, diarrhea, nausea and vomiting.   Genitourinary: Positive for dysuria. Negative for difficulty urinating and frequency.   Musculoskeletal: Negative for arthralgias and back pain.   Skin: Negative for color change and rash.   Neurological: Positive for headaches (on occasion). Negative for dizziness, weakness and numbness.   Hematological: Negative for adenopathy. Does not bruise/bleed easily.   Psychiatric/Behavioral: The patient is not nervous/anxious.        ECOG Performance Status: 0   Objective:      Vitals:   Vitals:    06/29/22 1008   BP: 110/70   BP Location: Left arm   Patient Position: Sitting   BP Method: Medium (Manual)   Pulse: 81   Temp: 97.9 °F (36.6 °C)   TempSrc: Temporal   SpO2: 100%   Weight: 46.5 kg (102 lb  "8.2 oz)   Height: 5' 1" (1.549 m)       Physical Exam  Constitutional:       General: She is not in acute distress.     Appearance: She is well-developed. She is not diaphoretic.   HENT:      Head: Normocephalic and atraumatic.   Eyes:      General: No scleral icterus.        Right eye: No discharge.         Left eye: No discharge.   Cardiovascular:      Rate and Rhythm: Normal rate and regular rhythm.      Heart sounds: Normal heart sounds. No murmur heard.    No friction rub. No gallop.   Pulmonary:      Effort: Pulmonary effort is normal. No respiratory distress.      Breath sounds: Normal breath sounds. No wheezing or rales.   Chest:      Chest wall: No tenderness.   Breasts:      Right: No axillary adenopathy or supraclavicular adenopathy.      Left: No axillary adenopathy or supraclavicular adenopathy.       Abdominal:      General: Bowel sounds are normal. There is no distension.      Palpations: Abdomen is soft. There is no mass.      Tenderness: There is no abdominal tenderness. There is no guarding or rebound.   Musculoskeletal:         General: No tenderness. Normal range of motion.   Lymphadenopathy:      Cervical: No cervical adenopathy.      Upper Body:      Right upper body: No supraclavicular or axillary adenopathy.      Left upper body: No supraclavicular or axillary adenopathy.   Skin:     Findings: No erythema or rash.   Neurological:      Mental Status: She is alert and oriented to person, place, and time.   Psychiatric:         Behavior: Behavior normal.         Laboratory Data:  No visits with results within 1 Week(s) from this visit.   Latest known visit with results is:   Lab Visit on 06/22/2022   Component Date Value Ref Range Status    D. farinae 06/22/2022 <0.10  <0.10 kU/L Final    D. farinae Class 06/22/2022 CLASS 0   Final    Comment: Test performed at [facility],  [address] , [place], [place]  [id number] [phone]  Iván Rossi MD  - Medical Director      " Mite Dust Pteronyssinus IgE 06/22/2022 <0.10  <0.10 kU/L Final    D. pteronyssinus Class 06/22/2022 CLASS 0   Final    Comment: Test performed at [facility],  [address] [place] [id number] [phone]  Iván Rossi MD  - Medical Director      Bermuda Grass 06/22/2022 0.48 (A) <0.10 kU/L Final    Bermuda Grass Class 06/22/2022 CLASS 1   Final    Comment: Test performed at [facility],  [address] [id number] [phone]  Iván Rossi MD  - Medical Director      Jonathon Grass 06/22/2022 2.61 (A) <0.10 kU/L Final    Jonathon Grass Class 06/22/2022 CLASS 2   Final    Comment: Test performed at [facility],  [address] [place]  [address] [phone]  Iván Rossi MD  - Medical Director      [place] IgE 06/22/2022 <0.10  <0.10 kU/L Final    [place] Class 06/22/2022 CLASS 0   Final    Comment: Test performed at [facility],  [address] [place] [id number] [phone]  Iván Rossi MD  - Medical Director      Plantain 06/22/2022 0.30 (A) <0.10 kU/L Final    English Plantain Class 06/22/2022 CLASS 0/1   Final    Comment: Test performed at [facility],  [address] [place]  [address] [phone]  Iván Rossi MD  - Medical Director      [place](Quercus alba) IgE 06/22/2022 0.28 (A) <0.10 kU/L Final    [place], Class 06/22/2022 CLASS 0/1   Final    Comment: Test performed at [facility],  [address] [place]  [address] [phone]  Iván Rossi MD  - Medical Director      David Castry Tree 06/22/2022 0.13 (A) <0.10 kU/L Final    Pecan, Class 06/22/2022 CLASS 0/1   Final    Comment: Test performed at [facility],  [address] , [place]  [address] [phone]  Iván Rossi MD  - Medical Director      Dixie Benjamin [facility] 06/22/2022 0.27 (A) <0.10 kU/L Final    Marshelder Class 06/22/2022 CLASS 0/1    Final    Comment: Test performed at [facility],  [address] [place]  [address] [phone]  Iván Rossi MD  - Medical Director      Ragweed, Western IgE 06/22/2022 0.25 (A) <0.10 kU/L Final    Ragweed, Western, Class 06/22/2022 CLASS 0/1   Final    Comment: Test performed at [facility],  [address] [place]  [address] [phone]  Iván Rossi MD  - Medical Director      Alternaria alternata 06/22/2022 <0.10  <0.10 kU/L Final    Altern. alternata Class 06/22/2022 CLASS 0   Final    Comment: Test performed at [facility],  [address] [place] [address]     [phone]  Iván Rossi MD  - Medical Director      Aspergillus Fumigatus IgE 06/22/2022 <0.10  <0.10 kU/L Final    A. fumigatus Class 06/22/2022 CLASS 0   Final    Comment: Test performed at [facility],  [address] [place] [id number] [phone]  Iván Rossi MD  - Medical Director      Cat Dander 06/22/2022 <0.10  <0.10 kU/L Final    Cat Epithelium Class 06/22/2022 CLASS 0   Final    Comment: Test performed at [facility],  [address] [place] [id number] [phone]  Iván Rossi MD  - Medical Director      Cockroach, IgE 06/22/2022 0.16 (A) <0.10 kU/L Final    Cockroach, IgE 06/22/2022 CLASS 0/1   Final    Comment: Test performed at [facility],  [address] [place]  [address] [phone]  Iván Rossi MD  - Medical Director      Dog Dander, IgE 06/22/2022 <0.10  <0.10 kU/L Final    Dog Dander Class 06/22/2022 CLASS 0   Final    Comment: Test performed at [facility],  [address] [place]  [address] [phone]  Iván Rossi MD  - Medical Director      White Maxime, IgE 06/22/2022 0.31 (A) <0.10 kU/L Final    Oswaldo Swartz Class 06/22/2022 CLASS 0/1   Final    Comment: Test performed at [facility],  [address], [place]  [address] [phone]  Iván Rossi MD  - Medical Director      Allergen Maple ([place]) IgE 2022 0.21 (A) <0.10 kU/L Final    Allergen Maple ([place]) Class 2022 CLASS 0/1   Final    Comment: Test performed at [facility],  [address] [place]  [address] [phone]  Iván Rossi MD  - Medical Director      Allergen Ary IgE 2022 0.17 (A) <0.10 kU/L Final    Allergen Ary Class 2022 CLASS 0/1   Final    Comment: Test performed at [facility],  [address] [place] [id number] [phone]  Iván Rossi MD  - Medical Director      Bahia Grass 2022 7.95 (A) <0.10 kU/L Final    Bahia Class 2022 CLASS 3   Final    Comment: Test performed at [facility],  [address] [place] [id number] [phone]  Iván Rossi MD  - Medical Director      Werner Grass 2022 2.29 (A) <0.10 kU/L Final    Werner Grass Class 2022 CLASS 2   Final    Comment: Test performed at [facility],  [address] [place] [id number] [phone]  Iván Rossi MD  - Medical Director      Webster IgE 2022 0.13 (A) <0.10 kU/L Final    [place] Class 2022 CLASS 0/1   Final    Comment: Test performed at [facility],  [address] [place]  [address] [phone]  Iván Rossi MD  - Medical Director      Silver Birch IgE 2022 <0.10  <0.10 kU/L Final    Silver Birch Class 2022 CLASS 0   Final    Comment: Test performed at [facility],  [address] [place]  [address] [phone]  Iván Rossi MD  - Medical Director           Imagin/04/22 US Pelvis    15.7 x 7.9 x 11.1 cm complex multi-septated cystic right ovarian mass which essentially replaces the entire right ovary    2/15/22 CT Abdomen and Pelvis    There is a large complex pelvic mass which is centered above  and above and left lateral to the uterus.  This measures 16.7 cm craniocaudal by 14 point 8 cm transversely by 12.8 cm AP.  This contains multiple cystic areas and demonstrates in prominent enhancement and vascularity in other areas.  The uterus is anteverted and compressed.  Whether this arises from the right or left ovary is uncertain    4/11/22 EGD Colonoscopy    - Normal oropharynx.                          - Normal esophagus.                          - Z-line regular, 37 cm from the incisors.                          - Normal cardia and gastric fundus.                          - Minimal gastritis. Biopsied.                          - Minimal antritis. Biopsied.                          - Normal stomach otherwise.                          - Normal pylorus.                          - Normal examined duodenum.                          - Normal examined jejunum.                          - Normal major papilla.     - Non-bleeding internal hemorrhoids.                          - Redundant colon.                          - The examination was otherwise normal.                          - The examined portion of the ileum was normal.                          - No specimens collected.     4/21/22 PET/CT    In the head and neck, there are no hypermetabolic lesions worrisome for malignancy. There are no hypermetabolic mucosal lesions, and there are no pathologically enlarged or hypermetabolic lymph nodes.  Symmetric physiologic uptake in the palatine tonsils, submandibular glands.  Mild diffusely increased uptake in the thyroid gland bilaterally in keeping with thyroiditis.     In the chest, there are no hypermetabolic lesions worrisome for malignancy.  There are no concerning pulmonary nodules or masses, and there are no pathologically enlarged or hypermetabolic lymph nodes.  Normal faint thymic uptake, and symmetric physiologic uptake in the glandular tissue of the breasts.     In the abdomen and pelvis, there is  multifocal activity in the right adnexal region with a maximum SUV of 6.6, atypical for physiologic ovarian uptake in a premenopausal woman common activity may relate to the right ovary and adjacent bowel.     Post operative change of left salpingo-oophorectomy for left ovarian neoplasm including the anterior abdominal wall from laparoscopy.  Punctate side of low-grade FDG avidity which appears localized to the left subserosal aspect of the uterus, possibly representing misregistration of focal pooling of excreted tracer within the distal left ureter.  Small pelvic fluid collection measuring higher than simple fluid density, presumably reactive from recent intervention.  Mullika [place]     Diffusely increased uptake in the gastric cardia and body without discrete nodularity or mass, likely reflecting decompression and gastritis.  There is otherwise physiologic tracer distribution within the abdominal organs and excretion into the genitourinary system.     In the bones, there are no hypermetabolic lesions worrisome for malignancy.     In the extremities, there are no hypermetabolic lesions worrisome for malignancy.     Assessment:       1. Metastatic malignant neoplasm, unspecified site    2. Normocytic anemia           Plan:     Metastatic Adenocarcinoma to the Right Ovary - the patient was found to have adenocarcinoma metastatic to the right ovary on 03/10/2022  Molecular markers were suggestive of intestinal origin  Patient underwent colonoscopy and EGD without pathology found  In addition patient underwent complete hysterectomy with right salpingo oophorectomy and extensive abdominal surgery without additional Mets found  Will repeat PET-CT  Will have patient see Dr. Malik with Gastroenterology for possible EUS assessment of the pancreas  -Entrance into the STR[facility] [place] Trial in late July when it opens was discussed  -Assessment for circulating tumor DNA using Signatera also discussed  -Pt to return once  PET/CT completed  -Prior CEA and  were normal 6/22/22  -Ca-125 was elevated 2/15/22 at 42 U/mL.  Will reassess    Normocytic Anemia - likely due to blood loss during prior surgeries given hemoglobin was normal 3/04/22  -Will check CBC and iron studies prior to next visit  -Will monitor    Route Chart for Scheduling    Med Onc Chart Routing      Follow up with physician Other. The patient will need to be scehdueld to see Dr Echols in GI for potential EUS to assess the pancreas.  She will need a PET/CT with return appt with me once it is completed.  Pt will need CBC< CMP, ferritin, iron/TIBC and Ca-125 done prior to next visit   Follow up with YADIRA    Infusion scheduling note    Injection scheduling note    Labs    Imaging    Pharmacy appointment    Other referrals               Jameson Hou MD  [facility]  Hematology and Oncology  [facility]   [address] [place] [address]   O: [phone]  F: [phone]

## 2022-06-30 ENCOUNTER — TELEPHONE (OUTPATIENT)
Dept: HEMATOLOGY/ONCOLOGY | Facility: CLINIC | Age: 32
End: 2022-06-30
Payer: COMMERCIAL

## 2022-06-30 NOTE — TELEPHONE ENCOUNTER
----- Message from Jameson Hou MD sent at 6/29/2022  6:50 PM CDT -----  Please have the patient scheduled for CBC< CMP, ferritin, iron/TIBC and CA-125 on the day of her return appt with me.  Please call the paitent with the appt.

## 2022-06-30 NOTE — TELEPHONE ENCOUNTER
Called and spoke with pt. Pt was notified that Dr. Hou wanted her to come in to do labs prior to her appt on 7/7/2022 with him. Pt was added on for the same day 1 hr early for labs. Pt verbalized acceptance and understanding.

## 2022-07-05 ENCOUNTER — HOSPITAL ENCOUNTER (OUTPATIENT)
Dept: RADIOLOGY | Facility: HOSPITAL | Age: 32
Discharge: HOME | End: 2022-07-05
Attending: INTERNAL MEDICINE
Payer: COMMERCIAL

## 2022-07-05 DIAGNOSIS — C79.9 METASTATIC MALIGNANT NEOPLASM, UNSPECIFIED SITE: ICD-10-CM

## 2022-07-05 LAB — GLUCOSE SERPL-MCNC: 79 MG/DL (ref 70–110)

## 2022-07-05 PROCEDURE — 78815 NM PET CT ROUTINE: ICD-10-PCS | Mod: 26,PS,, | Performed by: RADIOLOGY

## 2022-07-05 PROCEDURE — A9552 F18 FDG: HCPCS | Mod: PN

## 2022-07-05 PROCEDURE — 78815 PET IMAGE W/CT SKULL-THIGH: CPT | Mod: 26,PS,, | Performed by: RADIOLOGY

## 2022-07-05 NOTE — PROGRESS NOTES
PET Imaging Questionnaire    1. Are you a Diabetic? Recent Blood Sugar level? No    2. Are you anemic? Bone Marrow Stimulation Meds? Yes    3. Have you had a CT Scan, if so when & where was your last one? Yes -     4. Have you had a PET Scan, if so when & where was your last one? Yes -     5. Chemotherapy or currently on Chemotherapy? Yes    6. Radiation therapy? Yes    Surgical History:   Past Surgical History:   Procedure Laterality Date    COLONOSCOPY N/A 4/11/2022    Procedure: COLONOSCOPY;  Surgeon: Eric Meléndez Jr., MD;  Location: [facility];  Service: Endoscopy;  Laterality: N/A;    ESOPHAGOGASTRODUODENOSCOPY N/A 4/11/2022    Procedure: EGD (ESOPHAGOGASTRODUODENOSCOPY);  Surgeon: Eric Meléndez Jr., MD;  Location: [facility];  Service: Endoscopy;  Laterality: N/A;    ROBOT-ASSISTED APPENDECTOMY  5/13/2022    Procedure: ROBOTIC APPENDECTOMY;  Surgeon: Charmaine Delacruz MD;  Location: [facility];  Service: OB/GYN;;    ROBOT-ASSISTED LAPAROSCOPIC ABDOMINAL HYSTERECTOMY USING DA DARVIN XI N/A 5/13/2022    Procedure: XI ROBOTIC HYSTERECTOMY;  Surgeon: Charmaine Delacruz MD;  Location: [facility];  Service: OB/GYN;  Laterality: N/A;    ROBOT-ASSISTED LAPAROSCOPIC OMENTECTOMY USING DA DARVIN XI N/A 5/13/2022    Procedure: XI ROBOTIC OMENTECTOMY;  Surgeon: Charmaine Delacruz MD;  Location: [facility];  Service: OB/GYN;  Laterality: N/A;    ROBOT-ASSISTED LAPAROSCOPIC SALPINGO-OOPHORECTOMY USING DA DARVIN XI Left 3/10/2022    Procedure: XI ROBOTIC SALPINGO-OOPHORECTOMY/ USO;  Surgeon: Charmaine Delacruz MD;  Location: [address];  Service: OB/GYN;  Laterality: Left;    ROBOT-ASSISTED LAPAROSCOPIC SURGICAL REMOVAL OF OVARY USING DA DARVIN XI Right 5/13/2022    Procedure: XI ROBOTIC OOPHORECTOMY;  Surgeon: Charmaine Delacruz MD;  Location: [facility];  Service: OB/GYN;  Laterality: Right;    ROBOT-ASSISTED SURGICAL REMOVAL OF FALLOPIAN TUBE USING DA DARVIN XI Bilateral 5/13/2022    Procedure: XI ROBOTIC SALPINGECTOMY;  Surgeon:  Charmaine Delacruz MD;  Location: [facility];  Service: OB/GYN;  Laterality: Bilateral;   7.      8. Have you been fasting for at least 6 hours? Yes    9. Is there any chance you may be pregnant or breastfeeding? No    Assay: 20.9 MCi@:13:05   Injection Site:RT AC    Residual: 9.46 mCi@: 13:07   Technologist: Chacorta Montano Injected:11.44mCi

## 2022-07-06 ENCOUNTER — TELEPHONE (OUTPATIENT)
Dept: HEMATOLOGY/ONCOLOGY | Facility: CLINIC | Age: 32
End: 2022-07-06
Payer: COMMERCIAL

## 2022-07-06 ENCOUNTER — OFFICE VISIT (OUTPATIENT)
Dept: GASTROENTEROLOGY | Facility: CLINIC | Age: 32
End: 2022-07-06
Payer: COMMERCIAL

## 2022-07-06 VITALS
TEMPERATURE: 98 F | DIASTOLIC BLOOD PRESSURE: 85 MMHG | HEIGHT: 61 IN | HEART RATE: 82 BPM | RESPIRATION RATE: 16 BRPM | WEIGHT: 104.06 LBS | OXYGEN SATURATION: 100 % | BODY MASS INDEX: 19.65 KG/M2 | SYSTOLIC BLOOD PRESSURE: 128 MMHG

## 2022-07-06 DIAGNOSIS — C79.9 METASTATIC MALIGNANT NEOPLASM, UNSPECIFIED SITE: ICD-10-CM

## 2022-07-06 DIAGNOSIS — C79.9 METASTATIC ADENOCARCINOMA: Primary | ICD-10-CM

## 2022-07-06 PROCEDURE — 99999 PR PBB SHADOW E&M-EST. PATIENT-LVL IV: CPT | Mod: PBBFAC,,, | Performed by: INTERNAL MEDICINE

## 2022-07-06 PROCEDURE — 3074F SYST BP LT 130 MM HG: CPT | Mod: CPTII,S$GLB,, | Performed by: INTERNAL MEDICINE

## 2022-07-06 PROCEDURE — 99214 PR OFFICE/OUTPT VISIT, EST, LEVL IV, 30-39 MIN: ICD-10-PCS | Mod: S$GLB,,, | Performed by: INTERNAL MEDICINE

## 2022-07-06 PROCEDURE — 3008F BODY MASS INDEX DOCD: CPT | Mod: CPTII,S$GLB,, | Performed by: INTERNAL MEDICINE

## 2022-07-06 PROCEDURE — 99999 PR PBB SHADOW E&M-EST. PATIENT-LVL IV: ICD-10-PCS | Mod: PBBFAC,,, | Performed by: INTERNAL MEDICINE

## 2022-07-06 PROCEDURE — 99214 OFFICE O/P EST MOD 30 MIN: CPT | Mod: S$GLB,,, | Performed by: INTERNAL MEDICINE

## 2022-07-06 PROCEDURE — 3079F DIAST BP 80-89 MM HG: CPT | Mod: CPTII,S$GLB,, | Performed by: INTERNAL MEDICINE

## 2022-07-06 PROCEDURE — 3008F PR BODY MASS INDEX (BMI) DOCUMENTED: ICD-10-PCS | Mod: CPTII,S$GLB,, | Performed by: INTERNAL MEDICINE

## 2022-07-06 PROCEDURE — 1159F MED LIST DOCD IN RCRD: CPT | Mod: CPTII,S$GLB,, | Performed by: INTERNAL MEDICINE

## 2022-07-06 PROCEDURE — 3079F PR MOST RECENT DIASTOLIC BLOOD PRESSURE 80-89 MM HG: ICD-10-PCS | Mod: CPTII,S$GLB,, | Performed by: INTERNAL MEDICINE

## 2022-07-06 PROCEDURE — 1159F PR MEDICATION LIST DOCUMENTED IN MEDICAL RECORD: ICD-10-PCS | Mod: CPTII,S$GLB,, | Performed by: INTERNAL MEDICINE

## 2022-07-06 PROCEDURE — 3074F PR MOST RECENT SYSTOLIC BLOOD PRESSURE < 130 MM HG: ICD-10-PCS | Mod: CPTII,S$GLB,, | Performed by: INTERNAL MEDICINE

## 2022-07-06 NOTE — PROGRESS NOTES
GI Clinic Note    HPI  Francis Nagel is a very pleasant 31 y.o.  female with recent diagnosis of adenocarcinoma within left ovary, s/p surgical resection, here for initial visit.  Patient initially underwent oophorectomy of the L ovary in 3/2022 for removal of L ovarian mass.  Additional surgery was performed 5/2022 with total hysterectomy, R ooporectomy, and lymph node dissection with omentectomy and peritoneal biopsies, all of which came back negative. Original Pathology from surgical specimen in 3/2022 was concerning for metastatic adenocarcinoma, likely of GI origin.  Recent EGD/Colonoscopy were performed and were unremarkable.  CT and MRI shows normal pancreas, but EUS has been requested to fully rule out the possibility of pancreatic primary.   Patient is asymptomatic.  She has no GI complaints.        Past Medical History  Past Medical History:   Diagnosis Date    Anemia 03/11/2022    Encounter for blood transfusion 03/11/2022    Mass of left ovary 03/10/2022    JOSE MIGUEL.     LEFT OVARY SHOWS ADENOCARCINOMA, INTESTINAL PHENOTYPE MEASURING 15 CM    Metastatic cancer 4/11/2022    PONV (postoperative nausea and vomiting)     S/P unilateral salpingo-oophorectomy/mass resection 03/10/2022       Past Surgical History  Past Surgical History:   Procedure Laterality Date    COLONOSCOPY N/A 4/11/2022    Procedure: COLONOSCOPY;  Surgeon: Eric Meléndez Jr., MD;  Location: [facility];  Service: Endoscopy;  Laterality: N/A;    ESOPHAGOGASTRODUODENOSCOPY N/A 4/11/2022    Procedure: EGD (ESOPHAGOGASTRODUODENOSCOPY);  Surgeon: Eric Meléndez Jr., MD;  Location: [facility];  Service: Endoscopy;  Laterality: N/A;    ROBOT-ASSISTED APPENDECTOMY  5/13/2022    Procedure: ROBOTIC APPENDECTOMY;  Surgeon: Charmaine Delacruz MD;  Location: [facility];  Service: OB/GYN;;    ROBOT-ASSISTED LAPAROSCOPIC ABDOMINAL HYSTERECTOMY USING DA DARVIN XI N/A 5/13/2022    Procedure: XI ROBOTIC HYSTERECTOMY;  Surgeon: Charmaine Delacruz MD;   [facility];  Service: OB/GYN;  Laterality: N/A;    ROBOT-ASSISTED LAPAROSCOPIC OMENTECTOMY USING DA DARVIN XI N/A 5/13/2022    Procedure: XI ROBOTIC OMENTECTOMY;  Surgeon: Charmaine Delacruz MD;  [facility];  Service: OB/GYN;  Laterality: N/A;    ROBOT-ASSISTED LAPAROSCOPIC SALPINGO-OOPHORECTOMY USING DA DARVIN XI Left 3/10/2022    Procedure: XI ROBOTIC SALPINGO-OOPHORECTOMY/ USO;  Surgeon: Charmaine Delacruz MD;  [address]  Service: OB/GYN;  Laterality: Left;    ROBOT-ASSISTED LAPAROSCOPIC SURGICAL REMOVAL OF OVARY USING DA DARVIN XI Right 5/13/2022    Procedure: XI ROBOTIC OOPHORECTOMY;  Surgeon: Charmaine Delacruz MD;  [facility];  Service: OB/GYN;  Laterality: Right;    ROBOT-ASSISTED SURGICAL REMOVAL OF FALLOPIAN TUBE USING DA DARVIN XI Bilateral 5/13/2022    Procedure: XI ROBOTIC SALPINGECTOMY;  Surgeon: Charmaine Delacruz MD;  [facility];  Service: OB/GYN;  Laterality: Bilateral;       Medications  Current Outpatient Medications   Medication Instructions    cetirizine (ZYRTEC) 10 MG tablet Oral, Daily    ketotifen (ZADITOR) 0.025 % (0.035 %) ophthalmic solution 1 drop, Both Eyes, 3 times daily    olopatadine (PATADAY) 0.2 % Drop 1 drop, Both Eyes, Daily    ondansetron (ZOFRAN-ODT) 4 mg, Oral, Every 6 hours PRN        Allergies  Review of patient's allergies indicates:  No Known Allergies      Review of Systems     ROS negative except as otherwise mentioned in the HPI        Physical Exam    Vitals:    07/06/22 1426   BP: 128/85   Pulse: 82   Resp: 16   Temp: 98 °F (36.7 °C)     Physical Examination:     General: well developed, well nourished  Eyes: conjunctivae/corneas clear. PERRL..  HENT: Head:normocephalic, atraumatic. Ears:hearing grossly normal bilaterally. Nose: Nares normal. Septum midline. Mucosa normal. No drainage or sinus tenderness., no discharge. Throat: lips, mucosa, and tongue normal; teeth and gums normal and no throat erythema.  Neck: supple,  symmetrical, trachea midline, no JVD and thyroid not enlarged, symmetric, no tenderness/mass/nodules  Lungs:  clear to auscultation bilaterally and normal respiratory effort  Cardiovascular: Heart: regular rate and rhythm, S1, S2 normal, no murmur, click, rub or gallop. Chest Wall: no tenderness. Extremities: no cyanosis or edema, or clubbing. Pulses: 2+ and symmetric.  Abdomen/Rectal: Abdomen: soft, non-tender non-distented; bowel sounds normal; no masses,  no organomegaly. Rectal: not examined  Skin: Skin color, texture, turgor normal. No rashes or lesions  Musculoskeletal:normal gait and no clubbing, cyanosis  Lymph Nodes: No cervical or supraclavicular adenopathy  Neurologic: Normal strength and tone. No focal numbness or weakness  Psych/Behavioral:  Normal. and Alert and oriented, appropriate affect.            Assessment/Plan    1. Metastatic adenocarcinoma of unknown primary  -surgical records and pathology records reviewed from L oophorectomy.  Suspicious for metastatic adenocarcinoma from GI origin  -EGD and Colonoscopy were performed recently and unremarkable.  EUS has been requested to rule out pancreatic primary  -will schedule for EUS.  Patient agreeable to this plan.  All questions answered  -further recs pending results  -to follow up with medical oncology for further treatment following procedure       Metastatic malignant neoplasm, unspecified site  -     Ambulatory referral/consult to Gastroenterology

## 2022-07-06 NOTE — NURSING
Introduced self to patient and explained role. Patient here a new patient to see Dr. Echols.  A colonoscopy will be scheduled by Dr. Echols's office.  Office will contact patient to coordinate scheduling.  Patient provided contact information and verbalized understanding

## 2022-07-07 ENCOUNTER — LAB VISIT (OUTPATIENT)
Dept: LAB | Facility: HOSPITAL | Age: 32
End: 2022-07-07
Attending: INTERNAL MEDICINE
Payer: COMMERCIAL

## 2022-07-07 ENCOUNTER — OFFICE VISIT (OUTPATIENT)
Dept: HEMATOLOGY/ONCOLOGY | Facility: CLINIC | Age: 32
End: 2022-07-07
Payer: COMMERCIAL

## 2022-07-07 VITALS
SYSTOLIC BLOOD PRESSURE: 110 MMHG | OXYGEN SATURATION: 96 % | WEIGHT: 104.94 LBS | HEIGHT: 61 IN | TEMPERATURE: 98 F | HEART RATE: 80 BPM | DIASTOLIC BLOOD PRESSURE: 78 MMHG | BODY MASS INDEX: 19.81 KG/M2

## 2022-07-07 DIAGNOSIS — C79.9 METASTATIC MALIGNANT NEOPLASM, UNSPECIFIED SITE: ICD-10-CM

## 2022-07-07 DIAGNOSIS — D64.9 NORMOCYTIC ANEMIA: ICD-10-CM

## 2022-07-07 DIAGNOSIS — C80.1: ICD-10-CM

## 2022-07-07 DIAGNOSIS — C79.9 METASTATIC MALIGNANT NEOPLASM, UNSPECIFIED SITE: Primary | ICD-10-CM

## 2022-07-07 DIAGNOSIS — E06.9 THYROIDITIS: ICD-10-CM

## 2022-07-07 LAB
ALBUMIN SERPL BCP-MCNC: 4.1 G/DL (ref 3.5–5.2)
ALP SERPL-CCNC: 53 U/L (ref 55–135)
ALT SERPL W/O P-5'-P-CCNC: 46 U/L (ref 10–44)
ANION GAP SERPL CALC-SCNC: 11 MMOL/L (ref 8–16)
AST SERPL-CCNC: 26 U/L (ref 10–40)
BASOPHILS # BLD AUTO: 0.03 K/UL (ref 0–0.2)
BASOPHILS NFR BLD: 0.7 % (ref 0–1.9)
BILIRUB SERPL-MCNC: 0.4 MG/DL (ref 0.1–1)
BUN SERPL-MCNC: 5 MG/DL (ref 6–20)
CALCIUM SERPL-MCNC: 9.3 MG/DL (ref 8.7–10.5)
CANCER AG125 SERPL-ACNC: 11 U/ML (ref 0–30)
CHLORIDE SERPL-SCNC: 105 MMOL/L (ref 95–110)
CO2 SERPL-SCNC: 25 MMOL/L (ref 23–29)
CREAT SERPL-MCNC: 0.6 MG/DL (ref 0.5–1.4)
DIFFERENTIAL METHOD: ABNORMAL
EOSINOPHIL # BLD AUTO: 0.2 K/UL (ref 0–0.5)
EOSINOPHIL NFR BLD: 5.4 % (ref 0–8)
ERYTHROCYTE [DISTWIDTH] IN BLOOD BY AUTOMATED COUNT: 11.2 % (ref 11.5–14.5)
EST. GFR  (AFRICAN AMERICAN): >60 ML/MIN/1.73 M^2
EST. GFR  (NON AFRICAN AMERICAN): >60 ML/MIN/1.73 M^2
FERRITIN SERPL-MCNC: 172 NG/ML (ref 20–300)
GLUCOSE SERPL-MCNC: 69 MG/DL (ref 70–110)
HCT VFR BLD AUTO: 38.5 % (ref 37–48.5)
HGB BLD-MCNC: 12.9 G/DL (ref 12–16)
IMM GRANULOCYTES # BLD AUTO: 0 K/UL (ref 0–0.04)
IMM GRANULOCYTES NFR BLD AUTO: 0 % (ref 0–0.5)
IRON SERPL-MCNC: 127 UG/DL (ref 30–160)
LYMPHOCYTES # BLD AUTO: 1.8 K/UL (ref 1–4.8)
LYMPHOCYTES NFR BLD: 43.8 % (ref 18–48)
MCH RBC QN AUTO: 31.8 PG (ref 27–31)
MCHC RBC AUTO-ENTMCNC: 33.5 G/DL (ref 32–36)
MCV RBC AUTO: 95 FL (ref 82–98)
MONOCYTES # BLD AUTO: 0.4 K/UL (ref 0.3–1)
MONOCYTES NFR BLD: 8.6 % (ref 4–15)
NEUTROPHILS # BLD AUTO: 1.7 K/UL (ref 1.8–7.7)
NEUTROPHILS NFR BLD: 41.5 % (ref 38–73)
NRBC BLD-RTO: 0 /100 WBC
PLATELET # BLD AUTO: 266 K/UL (ref 150–450)
PMV BLD AUTO: 9 FL (ref 9.2–12.9)
POTASSIUM SERPL-SCNC: 3.5 MMOL/L (ref 3.5–5.1)
PROT SERPL-MCNC: 8.1 G/DL (ref 6–8.4)
RBC # BLD AUTO: 4.06 M/UL (ref 4–5.4)
SATURATED IRON: 40 % (ref 20–50)
SODIUM SERPL-SCNC: 141 MMOL/L (ref 136–145)
TOTAL IRON BINDING CAPACITY: 314 UG/DL (ref 250–450)
TRANSFERRIN SERPL-MCNC: 212 MG/DL (ref 200–375)
WBC # BLD AUTO: 4.06 K/UL (ref 3.9–12.7)

## 2022-07-07 PROCEDURE — 1159F MED LIST DOCD IN RCRD: CPT | Mod: CPTII,S$GLB,, | Performed by: INTERNAL MEDICINE

## 2022-07-07 PROCEDURE — 85025 COMPLETE CBC W/AUTO DIFF WBC: CPT | Mod: PN | Performed by: INTERNAL MEDICINE

## 2022-07-07 PROCEDURE — 99999 PR PBB SHADOW E&M-EST. PATIENT-LVL III: CPT | Mod: PBBFAC,,, | Performed by: INTERNAL MEDICINE

## 2022-07-07 PROCEDURE — 82728 ASSAY OF FERRITIN: CPT | Performed by: INTERNAL MEDICINE

## 2022-07-07 PROCEDURE — 86304 IMMUNOASSAY TUMOR CA 125: CPT | Performed by: INTERNAL MEDICINE

## 2022-07-07 PROCEDURE — 3074F SYST BP LT 130 MM HG: CPT | Mod: CPTII,S$GLB,, | Performed by: INTERNAL MEDICINE

## 2022-07-07 PROCEDURE — 80053 COMPREHEN METABOLIC PANEL: CPT | Mod: PN | Performed by: INTERNAL MEDICINE

## 2022-07-07 PROCEDURE — 3008F BODY MASS INDEX DOCD: CPT | Mod: CPTII,S$GLB,, | Performed by: INTERNAL MEDICINE

## 2022-07-07 PROCEDURE — 3008F PR BODY MASS INDEX (BMI) DOCUMENTED: ICD-10-PCS | Mod: CPTII,S$GLB,, | Performed by: INTERNAL MEDICINE

## 2022-07-07 PROCEDURE — 3078F PR MOST RECENT DIASTOLIC BLOOD PRESSURE < 80 MM HG: ICD-10-PCS | Mod: CPTII,S$GLB,, | Performed by: INTERNAL MEDICINE

## 2022-07-07 PROCEDURE — 1159F PR MEDICATION LIST DOCUMENTED IN MEDICAL RECORD: ICD-10-PCS | Mod: CPTII,S$GLB,, | Performed by: INTERNAL MEDICINE

## 2022-07-07 PROCEDURE — 99999 PR PBB SHADOW E&M-EST. PATIENT-LVL III: ICD-10-PCS | Mod: PBBFAC,,, | Performed by: INTERNAL MEDICINE

## 2022-07-07 PROCEDURE — 84466 ASSAY OF TRANSFERRIN: CPT | Performed by: INTERNAL MEDICINE

## 2022-07-07 PROCEDURE — 3074F PR MOST RECENT SYSTOLIC BLOOD PRESSURE < 130 MM HG: ICD-10-PCS | Mod: CPTII,S$GLB,, | Performed by: INTERNAL MEDICINE

## 2022-07-07 PROCEDURE — 99213 OFFICE O/P EST LOW 20 MIN: CPT | Mod: S$GLB,,, | Performed by: INTERNAL MEDICINE

## 2022-07-07 PROCEDURE — 3078F DIAST BP <80 MM HG: CPT | Mod: CPTII,S$GLB,, | Performed by: INTERNAL MEDICINE

## 2022-07-07 PROCEDURE — 99213 PR OFFICE/OUTPT VISIT, EST, LEVL III, 20-29 MIN: ICD-10-PCS | Mod: S$GLB,,, | Performed by: INTERNAL MEDICINE

## 2022-07-07 PROCEDURE — 36415 COLL VENOUS BLD VENIPUNCTURE: CPT | Mod: PN | Performed by: INTERNAL MEDICINE

## 2022-07-07 NOTE — PROGRESS NOTES
PATIENT: Francis Nagel  MRN: [id number]  DATE: 7/7/2022      Diagnosis:   1. Metastatic malignant neoplasm, unspecified site    2. Malignant tumor of unknown origin    3. Normocytic anemia    4. Thyroiditis        Chief Complaint: Follow-up (1 week follow up for PET results )      Oncologic History:      Oncologic History 2/04/22 US Pelvis  2/15/22 CT Abdomen and Pelvis  3/10/22 robotic salpingo oophorectomy of the left ovary  4/11/22 EGD Colonoscopy  4/21/22 PET/CT  5/13/22 robotic assisted total laparoscopic hysterectomy, right salpingo oophorectomy, bilateral pelvic and periaortic lymph node dissection, omentectomy, peritoneal biopsies, and appendectomy  6/29/22 - PET/CT    Oncologic Treatment     Pathology 3/10/22 adenocarcinoma intestinal phenotype CK7+, 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  5/13/22  no sign of malignancy and 16 negative lymph nodes      The patient initially presented to see Dr Johnson on 2/03/22 with complaint of pelvic pain.  US of the pelvis on 2/04/22 showed a 15.7 x 7.9 x 11.1 complex multi-septated cystic right ovarian mass replacing the entire right ovary.  CT of the abdomen and pelvis on 02/15/2022 showed a large complex pelvic mass centered above an left lateral to the uterus measuring 16.7 cm in greatest dimension.  The patient then saw gynecologist oncologist Dr. Cintron on 02/16/2022.  She then saw Dr. Delacruz on 02/21/2022.  The patient then underwent robotic salpingo oophorectomy of the left ovary under the care of Dr. 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  The patient then underwent EGD and colonoscopy on 04/11/2022 showing gastritis, antritis, and internal hemorrhoids.  PET-CT performed 04/21/2022 showed mildly increased uptake in the thyroid g gland bilaterally in keeping with thyroiditis; multifocal activity in the right adnexal region with maximum SUV of 6.6 atypical for physiologic ovarian uptake in a premenopausal  woman; punctate area of low-grade FDG activity localized to the left subserosal aspect of the uterus; diffusely increased uptake in the gastric cardia and body without discrete nodularity or mass.  The patient then underwent robotic assisted total laparoscopic hysterectomy, right salpingo oophorectomy, bilateral pelvic and periaortic lymph node dissection, omentectomy, peritoneal biopsies, and appendectomy under the care of Dr. Delacruz on 05/13/2022 with pathology showing no sign of malignancy and 16 negative lymph nodes.  The patient was then admitted from [address] [place] [address] for postoperative pelvic abscess with placement of peritoneal drain.  Cultures grew out Klebsiella with sensitivities to Bactrim.  Patient was discussed at tumor Board on 06/08/2022 with recommendations to check CEA and CA 19 9, obtained MRI abdomen and pelvis with MRCP and hold adjuvant chemotherapy pending further workup.  The patient underwent MRCP on 06/22/2022 showing 11 mm subcapsular mass in segment 3 of the liver concerning for benign cavernous hemangioma; similar lesion in segment 4A measuring 6 mm; stable with respect of prior scan on 02/15/2022.  MRCP sequence a demonstrated normal hepatic biliary structures with normal pancreatic duct.    Subjective:    Initial History: Ms. Nagel is a 31 y.o. female with anemia who presents for work up of metastatic cancer.  Since the last clinic visit the patient underwent PET-CT on 06/29/2022 showing 17 x 14 mm fluid density along the right pelvic sidewall likely residual fluid from postop abscess consistent with small seroma or lymphocele and no evidence of recurrence or metastatic disease.  For patient met with Dr. Malik on 07/06/2022 with plan for EUS on 8/03/22.      Currently the patient states she feels well.  The patient denies CP, cough, SOB, abdominal pain, N/V, constipation, diarrhea.  The patient denies fever, chills, night sweats, weight loss, new lumps or bumps, easy bruising or  bleeding.    Past Medical History:   Past Medical History:   Diagnosis Date    Anemia 03/11/2022    Encounter for blood transfusion 03/11/2022    Mass of left ovary 03/10/2022    JOSE MIGUEL.     LEFT OVARY SHOWS ADENOCARCINOMA, INTESTINAL PHENOTYPE MEASURING 15 CM    Metastatic cancer 4/11/2022    PONV (postoperative nausea and vomiting)     S/P unilateral salpingo-oophorectomy/mass resection 03/10/2022       Past Surgical HIstory:   Past Surgical History:   Procedure Laterality Date    COLONOSCOPY N/A 4/11/2022    Procedure: COLONOSCOPY;  Surgeon: Eric Meléndez Jr., MD;  Location: [facility];  Service: Endoscopy;  Laterality: N/A;    ESOPHAGOGASTRODUODENOSCOPY N/A 4/11/2022    Procedure: EGD (ESOPHAGOGASTRODUODENOSCOPY);  Surgeon: Eric Meléndez Jr., MD;  Location: [facility];  Service: Endoscopy;  Laterality: N/A;    ROBOT-ASSISTED APPENDECTOMY  5/13/2022    Procedure: ROBOTIC APPENDECTOMY;  Surgeon: Charmaine Delacruz MD;  Location: [facility];  Service: OB/GYN;;    ROBOT-ASSISTED LAPAROSCOPIC ABDOMINAL HYSTERECTOMY USING DA DARVIN XI N/A 5/13/2022    Procedure: XI ROBOTIC HYSTERECTOMY;  Surgeon: Charmaine Delacruz MD;  Location: [facility];  Service: OB/GYN;  Laterality: N/A;    ROBOT-ASSISTED LAPAROSCOPIC OMENTECTOMY USING DA DARVIN XI N/A 5/13/2022    Procedure: XI ROBOTIC OMENTECTOMY;  Surgeon: Charmaine Delacruz MD;  Location: [facility];  Service: OB/GYN;  Laterality: N/A;    ROBOT-ASSISTED LAPAROSCOPIC SALPINGO-OOPHORECTOMY USING DA DARVIN XI Left 3/10/2022    Procedure: XI ROBOTIC SALPINGO-OOPHORECTOMY/ USO;  Surgeon: Charmaine Delacruz MD;  [address];  Service: OB/GYN;  Laterality: Left;    ROBOT-ASSISTED LAPAROSCOPIC SURGICAL REMOVAL OF OVARY USING DA DARVIN XI Right 5/13/2022    Procedure: XI ROBOTIC OOPHORECTOMY;  Surgeon: Charmaine Delacruz MD;  Location: [facility];  Service: OB/GYN;  Laterality: Right;    ROBOT-ASSISTED SURGICAL REMOVAL OF FALLOPIAN TUBE USING DA DARVIN XI Bilateral 5/13/2022     "Procedure: XI ROBOTIC SALPINGECTOMY;  Surgeon: Charmaine Delacruz MD;  Location: [facility];  Service: OB/GYN;  Laterality: Bilateral;       Family History:   Family History   Problem Relation Age of Onset    Breast cancer Maternal Aunt     Lung cancer Paternal Uncle     Colon cancer Neg Hx     Ovarian cancer Neg Hx        Social History:  reports that she has never smoked. She has never used smokeless tobacco. She reports current alcohol use. She reports that she does not use drugs.    Allergies:  Review of patient's allergies indicates:  No Known Allergies    Medications:  Current Outpatient Medications   Medication Sig Dispense Refill    cetirizine (ZYRTEC) 10 MG tablet Take by mouth once daily.      ketotifen (ZADITOR) 0.025 % (0.035 %) ophthalmic solution Place 1 drop into both eyes 3 (three) times daily.      olopatadine (PATADAY) 0.2 % Drop Place 1 drop into both eyes once daily. 2.5 mL 5     No current facility-administered medications for this visit.       Review of Systems   Constitutional: Negative for chills, fatigue, fever and unexpected weight change.   Respiratory: Negative for cough and shortness of breath.    Cardiovascular: Negative for chest pain and palpitations.   Gastrointestinal: Negative for abdominal pain, constipation, diarrhea, nausea and vomiting.   Skin: Negative for rash.   Neurological: Negative for headaches.   Hematological: Negative for adenopathy. Does not bruise/bleed easily.       ECOG Performance Status: 0   Objective:      Vitals:   Vitals:    07/07/22 1337   BP: 110/78   BP Location: Left arm   Patient Position: Sitting   BP Method: Medium (Manual)   Pulse: 80   Temp: 98.4 °F (36.9 °C)   TempSrc: Temporal   SpO2: 96%   Weight: 47.6 kg (104 lb 15 oz)   Height: 5' 1" (1.549 m)       Physical Exam    Laboratory Data:  Lab Visit on 07/07/2022   Component Date Value Ref Range Status    WBC 07/07/2022 4.06  3.90 - 12.70 K/uL Final    RBC 07/07/2022 4.06  4.00 - 5.40 M/uL Final "    Hemoglobin 07/07/2022 12.9  12.0 - 16.0 g/dL Final    Hematocrit 07/07/2022 38.5  37.0 - 48.5 % Final    MCV 07/07/2022 95  82 - 98 fL Final    MCH 07/07/2022 31.8 (A) 27.0 - 31.0 pg Final    MCHC 07/07/2022 33.5  32.0 - 36.0 g/dL Final    RDW 07/07/2022 11.2 (A) 11.5 - 14.5 % Final    Platelets 07/07/2022 266  150 - 450 K/uL Final    MPV 07/07/2022 9.0 (A) 9.2 - 12.9 fL Final    Immature Granulocytes 07/07/2022 0.0  0.0 - 0.5 % Final    Gran # (ANC) 07/07/2022 1.7 (A) 1.8 - 7.7 K/uL Final    Immature Grans (Abs) 07/07/2022 0.00  0.00 - 0.04 K/uL Final    Comment: Mild elevation in immature granulocytes is non specific and   can be seen in a variety of conditions including stress response,   acute inflammation, trauma and pregnancy. Correlation with other   laboratory and clinical findings is essential.      Lymph # 07/07/2022 1.8  1.0 - 4.8 K/uL Final    Mono # 07/07/2022 0.4  0.3 - 1.0 K/uL Final    Eos # 07/07/2022 0.2  0.0 - 0.5 K/uL Final    Baso # 07/07/2022 0.03  0.00 - 0.20 K/uL Final    nRBC 07/07/2022 0  0 /100 WBC Final    Gran % 07/07/2022 41.5  38.0 - 73.0 % Final    Lymph % 07/07/2022 43.8  18.0 - 48.0 % Final    Mono % 07/07/2022 8.6  4.0 - 15.0 % Final    Eosinophil % 07/07/2022 5.4  0.0 - 8.0 % Final    Basophil % 07/07/2022 0.7  0.0 - 1.9 % Final    Differential Method 07/07/2022 Automated   Final    Sodium 07/07/2022 141  136 - 145 mmol/L Final    Potassium 07/07/2022 3.5  3.5 - 5.1 mmol/L Final    Chloride 07/07/2022 105  95 - 110 mmol/L Final    CO2 07/07/2022 25  23 - 29 mmol/L Final    Glucose 07/07/2022 69 (A) 70 - 110 mg/dL Final    BUN 07/07/2022 5 (A) 6 - 20 mg/dL Final    Creatinine 07/07/2022 0.6  0.5 - 1.4 mg/dL Final    Calcium 07/07/2022 9.3  8.7 - 10.5 mg/dL Final    Total Protein 07/07/2022 8.1  6.0 - 8.4 g/dL Final    Albumin 07/07/2022 4.1  3.5 - 5.2 g/dL Final    Total Bilirubin 07/07/2022 0.4  0.1 - 1.0 mg/dL Final    Comment: For infants and  newborns, interpretation of results should be based  on gestational age, weight and in agreement with clinical  observations.    Premature Infant recommended reference ranges:  Up to 24 hours.............<8.0 mg/dL  Up to 48 hours............<12.0 mg/dL  3-5 days..................<15.0 mg/dL  6-29 days.................<15.0 mg/dL      Alkaline Phosphatase 07/07/2022 53 (A) 55 - 135 U/L Final    AST 07/07/2022 26  10 - 40 U/L Final    ALT 07/07/2022 46 (A) 10 - 44 U/L Final    Anion Gap 07/07/2022 11  8 - 16 mmol/L Final    eGFR if African American 07/07/2022 >60  >60 mL/min/1.73 m^2 Final    eGFR if non African American 07/07/2022 >60  >60 mL/min/1.73 m^2 Final    Comment: Calculation used to obtain the estimated glomerular filtration  rate (eGFR) is the CKD-EPI equation.       Iron 07/07/2022 127  30 - 160 ug/dL Final    Transferrin 07/07/2022 212  200 - 375 mg/dL Final    TIBC 07/07/2022 314  250 - 450 ug/dL Final    Saturated Iron 07/07/2022 40  20 - 50 % Final   Hospital Outpatient Visit on 07/05/2022   Component Date Value Ref Range Status    POC Glucose 07/05/2022 79  70 - 110 MG/DL Final         Imaging:     PET/CT 6/29/22  Neck: There is again diffuse activity throughout both lobes of the thyroid gland suggesting thyroiditis.  No new sites of uptake are present in the neck.  There is no cervical adenopathy.     Chest: There is no abnormal FDG uptake in the thorax.  No mediastinal nor axillary adenopathy is present.  There is no pleural effusion.  The aorta is normal in caliber.     The lungs are clear.     Abdomen/pelvis: There is no abnormal FDG uptake in the abdomen or pelvis.  Since the prior PET-CT the patient has undergone hysterectomy, right salpingo-oophorectomy, bilateral pelvic and periaortic lymph node dissection, omentectomy, peritoneal biopsy and appendectomy.  Limited noncontrast CT images of the liver, gallbladder, spleen, adrenal glands, pancreas and kidneys are normal.  The aorta  is normal in caliber.  Urinary bladder is collapsed.  There is a trace quantity of pelvic free fluid.  No abnormalities are seen in the bowel.  There are bilateral inguinal lymph nodes which have increased in size by a few mm compared to the prior PET-CT but still remain within the normal size range.  The largest has a short axis diameter of 9 mm series 3, image 221.  There is no associated FDG uptake.  These are likely residual reactive nodes in this patient with a history of a postop pelvic abscess.  There is a 17 x 14 mm fluid density structure along the right pelvic sidewall which was  previously significantly larger measuring 4.3 x 2.5 cm on the CT of 05/20/2022.  This is consistent with a small seroma or lymphocele.     Bones: There is no abnormal FDG uptake in the bones; there is a stable circumscribed sclerotic lesion in the left posterior iliac bone series 3, image 193 which is likely a benign, incidental finding, perhaps a large bone island.     Assessment:       1. Metastatic malignant neoplasm, unspecified site    2. Malignant tumor of unknown origin    3. Normocytic anemia    4. Thyroiditis           Plan:     Metastatic Adenocarcinoma to the Right Ovary - the patient was found to have adenocarcinoma metastatic to the right ovary on 03/10/2022  Molecular markers were suggestive of intestinal origin  -Pt being managed as metastatic carcinoma of unknown origin  Patient underwent colonoscopy and EGD without pathology found  In addition patient underwent complete hysterectomy with right salpingo oophorectomy and extensive abdominal surgery without additional mets found  -Repeat PET/CT showed no residual disease  -PT states she would like to move up her EUS with Dr Echols to next week  -Treatment with FOLFOX for 6 months discussed  -Pt to follow up after EUS completed    Normocytic Anemia - likely due to blood loss during prior surgeries given hemoglobin was normal 3/04/22  -Improved to 12.9 on  7/07/22  -Will monitor    Thyroiditis - PET/CT shows continued thyroiditis  -Pt to see endocrinology    Route Chart for Scheduling    Med Onc Chart Routing      Follow up with physician Other. The patient needs an appt with endocrinology.  she needs a return appt with me the week of 7/18/22.   Follow up with YADIRA    Infusion scheduling note    Injection scheduling note    Labs    Imaging    Pharmacy appointment    Other referrals               Jameson Hou MD  [facility]  Hematology and Oncology  [facility]   [address]   [place] [address]   O: [phone]  F: [phone]

## 2022-08-04 ENCOUNTER — RESEARCH ENCOUNTER (OUTPATIENT)
Dept: RESEARCH | Facility: HOSPITAL | Age: 32
End: 2022-08-04
Payer: COMMERCIAL

## 2022-08-04 ENCOUNTER — OFFICE VISIT (OUTPATIENT)
Dept: HEMATOLOGY/ONCOLOGY | Facility: CLINIC | Age: 32
End: 2022-08-04
Payer: COMMERCIAL

## 2022-08-04 VITALS
BODY MASS INDEX: 20.06 KG/M2 | HEIGHT: 61 IN | WEIGHT: 106.25 LBS | TEMPERATURE: 98 F | DIASTOLIC BLOOD PRESSURE: 77 MMHG | OXYGEN SATURATION: 100 % | HEART RATE: 84 BPM | SYSTOLIC BLOOD PRESSURE: 109 MMHG | RESPIRATION RATE: 17 BRPM

## 2022-08-04 DIAGNOSIS — C80.1: Primary | ICD-10-CM

## 2022-08-04 DIAGNOSIS — E06.9 THYROIDITIS: ICD-10-CM

## 2022-08-04 DIAGNOSIS — D64.9 NORMOCYTIC ANEMIA: ICD-10-CM

## 2022-08-04 DIAGNOSIS — R10.2 PELVIC PAIN: ICD-10-CM

## 2022-08-04 PROBLEM — C79.9 METASTATIC CANCER: Status: RESOLVED | Noted: 2022-04-11 | Resolved: 2022-08-04

## 2022-08-04 PROCEDURE — 3008F BODY MASS INDEX DOCD: CPT | Mod: CPTII,S$GLB,, | Performed by: INTERNAL MEDICINE

## 2022-08-04 PROCEDURE — 3078F DIAST BP <80 MM HG: CPT | Mod: CPTII,S$GLB,, | Performed by: INTERNAL MEDICINE

## 2022-08-04 PROCEDURE — 1159F MED LIST DOCD IN RCRD: CPT | Mod: CPTII,S$GLB,, | Performed by: INTERNAL MEDICINE

## 2022-08-04 PROCEDURE — 99999 PR PBB SHADOW E&M-EST. PATIENT-LVL III: CPT | Mod: PBBFAC,,, | Performed by: INTERNAL MEDICINE

## 2022-08-04 PROCEDURE — 1159F PR MEDICATION LIST DOCUMENTED IN MEDICAL RECORD: ICD-10-PCS | Mod: CPTII,S$GLB,, | Performed by: INTERNAL MEDICINE

## 2022-08-04 PROCEDURE — 3074F PR MOST RECENT SYSTOLIC BLOOD PRESSURE < 130 MM HG: ICD-10-PCS | Mod: CPTII,S$GLB,, | Performed by: INTERNAL MEDICINE

## 2022-08-04 PROCEDURE — 99999 PR PBB SHADOW E&M-EST. PATIENT-LVL III: ICD-10-PCS | Mod: PBBFAC,,, | Performed by: INTERNAL MEDICINE

## 2022-08-04 PROCEDURE — 99215 PR OFFICE/OUTPT VISIT, EST, LEVL V, 40-54 MIN: ICD-10-PCS | Mod: S$GLB,,, | Performed by: INTERNAL MEDICINE

## 2022-08-04 PROCEDURE — 3008F PR BODY MASS INDEX (BMI) DOCUMENTED: ICD-10-PCS | Mod: CPTII,S$GLB,, | Performed by: INTERNAL MEDICINE

## 2022-08-04 PROCEDURE — 3078F PR MOST RECENT DIASTOLIC BLOOD PRESSURE < 80 MM HG: ICD-10-PCS | Mod: CPTII,S$GLB,, | Performed by: INTERNAL MEDICINE

## 2022-08-04 PROCEDURE — 99215 OFFICE O/P EST HI 40 MIN: CPT | Mod: S$GLB,,, | Performed by: INTERNAL MEDICINE

## 2022-08-04 PROCEDURE — 3074F SYST BP LT 130 MM HG: CPT | Mod: CPTII,S$GLB,, | Performed by: INTERNAL MEDICINE

## 2022-08-04 NOTE — PROGRESS NOTES
Patient Name:   Francis Nagel  MRN [id number]  [id number] (SENTINEL)  IRB# [id number]  Date: August 04, 2022     INFORMED CONSENT REVIEW.    Pt arrived at the clinic today for a visit w/ Dr. Hou. Per Dr. Hou's request, the patient was pre-screened and appeared as a potentially eligible match for the [employer] trial.   At the end of today's office visit, Dr. Hou introduced this CRC and Crys Ross ([facility]) to the patient in the exam room, where the specifics of the trial, including the pre-screening and enrollment processes were reviewed. This CRC also reviewed the informed consent with the patient and her .   The patient and her  both reported being excited about potentially participating in the [employer]en study. The patient took a copy of the informed consent home for review and said she would call the [place] research office with any questions or concerns. The patient  also said she plans to call next week to schedule a date and time for signing the consent.      We discussed the ICF in detail including:   * Purpose of the study   * Length of study and number of participants  * Procedures (Tissue testing and biomarker requirements)  * Study visits  * Risks  * Confidentiality and HIPAA Authorization for Release of Medical Records for the research trial/ subject's rights/research related injury  * Potential Benefits  * Costs  * Payment for Participation and/or Reimbursement of expenses  * Alternative/Treatments  * Study Related Questions and Compensation for Injury   * Participation in the research trial is voluntary and patient may withdraw at anytime; Questions about your rights as a research subject  * DNA sequencing  * Use of remaining samples, Return of research results   * Confidentiality/ HIPAA     Pt was given the opportunity for questions.She reports visiting [facility] about once every two years and was curious about planning for the every three month blood testing intervals. We  reviewed that the protocol allows for a +/-15 day window that could be planned for accordingly. All questions were answered to the patient and her 's satisfaction.   Contact information for this CRC and [facility] was provided to the patient along with a copy of the informed consent.     This information made aware to Dr. Hou, and follow-up with the patient is planned and noted on calendar.    CCarmel Abdullahi RN

## 2022-08-04 NOTE — PROGRESS NOTES
PATIENT: Francis Nagel  MRN: [id number]  DATE: 8/4/2022      Diagnosis:   1. Malignant tumor of unknown origin    2. Normocytic anemia    3. Thyroiditis    4. Pelvic pain        Chief Complaint: No chief complaint on file.      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  5/13/22  no sign of malignancy and 16 negative lymph nodes      The patient initially presented to see Dr Johnson on 2/03/22 with complaint of pelvic pain.  US of the pelvis on 2/04/22 showed a 15.7 x 7.9 x 11.1 complex multi-septated cystic right ovarian mass replacing the entire right ovary.  CT of the abdomen and pelvis on 02/15/2022 showed a large complex pelvic mass centered above an left lateral to the uterus measuring 16.7 cm in greatest dimension.  The patient then saw gynecologist oncologist Dr. Cintron on 02/16/2022.  She then saw Dr. Delacruz on 02/21/2022.  The patient then underwent robotic salpingo oophorectomy of the left ovary under the care of Dr. 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  The patient then underwent EGD and colonoscopy on 04/11/2022 showing gastritis, antritis, and internal hemorrhoids.  PET-CT performed 04/21/2022 showed mildly increased uptake in the thyroid g gland bilaterally in keeping with thyroiditis; multifocal activity in the right adnexal region with maximum SUV of 6.6 atypical for physiologic ovarian uptake  in a premenopausal woman; punctate area of low-grade FDG activity localized to the left subserosal aspect of the uterus; diffusely increased uptake in the gastric cardia and body without discrete nodularity or mass.  The patient then underwent robotic assisted total laparoscopic hysterectomy, right salpingo oophorectomy, bilateral pelvic and periaortic lymph node dissection, omentectomy, peritoneal biopsies, and appendectomy under the care of Dr. Delacruz on 05/13/2022 with pathology showing no sign of malignancy and 16 negative lymph nodes.  The patient was then admitted from [address] [place] [address] for postoperative pelvic abscess with placement of peritoneal drain.  Cultures grew out Klebsiella with sensitivities to Bactrim.  Patient was discussed at tumor Board on 06/08/2022 with recommendations to check CEA and CA 19 9, obtained MRI abdomen and pelvis with MRCP and hold adjuvant chemotherapy pending further workup.  The patient underwent MRCP on 06/22/2022 showing 11 mm subcapsular mass in segment 3 of the liver concerning for benign cavernous hemangioma; similar lesion in segment 4A measuring 6 mm; stable with respect of prior scan on 02/15/2022.  MRCP sequence a demonstrated normal hepatic biliary structures with normal pancreatic duct.   The patient underwent PET-CT on 06/29/2022 showing 17 x 14 mm fluid density along the right pelvic sidewall likely residual fluid from postop abscess consistent with small seroma or lymphocele and no evidence of recurrence or metastatic disease.    Subjective:    Initial History: Ms. Nagel is a 31 y.o. female with anemia who presents for work up of metastatic cancer.  Since the last clinic visit the patient underwent EUS with Dr Echols on 8/03/22 showing a lesion in the left lobe of the liver measuring [id number] and normal appearance of the pancreas, gallbladder, esophagus, stomach, duodenum and vessels.      Currently the patient states she is feeling well.  The patient denies CP, cough,  SOB, abdominal pain, N/V, constipation, diarrhea.  The patient denies fever, chills, night sweats, weight loss, new lumps or bumps, easy bruising or bleeding.    Past Medical History:   Past Medical History:   Diagnosis Date    Anemia 03/11/2022    Encounter for blood transfusion 03/11/2022    Mass of left ovary 03/10/2022    JOSE MIGUEL.     LEFT OVARY SHOWS ADENOCARCINOMA, INTESTINAL PHENOTYPE MEASURING 15 CM    Metastatic cancer 4/11/2022    PONV (postoperative nausea and vomiting)     S/P unilateral salpingo-oophorectomy/mass resection 03/10/2022       Past Surgical HIstory:   Past Surgical History:   Procedure Laterality Date    COLONOSCOPY N/A 4/11/2022    Procedure: COLONOSCOPY;  Surgeon: Eric Meléndez Jr., MD;  Location: [facility];  Service: Endoscopy;  Laterality: N/A;    ENDOSCOPIC ULTRASOUND OF UPPER GASTROINTESTINAL TRACT Left 8/3/2022    Procedure: ULTRASOUND, UPPER GI TRACT, ENDOSCOPIC;  Surgeon: Richar Echols MD;  Location: [facility];  Service: Endoscopy;  Laterality: Left;    ESOPHAGOGASTRODUODENOSCOPY N/A 4/11/2022    Procedure: EGD (ESOPHAGOGASTRODUODENOSCOPY);  Surgeon: Eric Meléndez Jr., MD;  Location: [facility];  Service: Endoscopy;  Laterality: N/A;    ESOPHAGOGASTRODUODENOSCOPY N/A 8/3/2022    Procedure: EGD (ESOPHAGOGASTRODUODENOSCOPY);  Surgeon: Richar Echols MD;  Location: [facility];  Service: Endoscopy;  Laterality: N/A;    ROBOT-ASSISTED APPENDECTOMY  5/13/2022    Procedure: ROBOTIC APPENDECTOMY;  Surgeon: Charmaine Delacruz MD;  Location: [facility];  Service: OB/GYN;;    ROBOT-ASSISTED LAPAROSCOPIC ABDOMINAL HYSTERECTOMY USING DA DARVIN XI N/A 5/13/2022    Procedure: XI ROBOTIC HYSTERECTOMY;  Surgeon: Charmaine Delacruz MD;  Location: [facility];  Service: OB/GYN;  Laterality: N/A;    ROBOT-ASSISTED LAPAROSCOPIC OMENTECTOMY USING DA DARVIN XI N/A 5/13/2022    Procedure: XI ROBOTIC OMENTECTOMY;  Surgeon: Charmaine Delacruz MD;  Location: [facility];  Service: OB/GYN;   Laterality: N/A;    ROBOT-ASSISTED LAPAROSCOPIC SALPINGO-OOPHORECTOMY USING DA DARVIN XI Left 3/10/2022    Procedure: XI ROBOTIC SALPINGO-OOPHORECTOMY/ USO;  Surgeon: Charmaine Delacruz MD;  Location: [address];  Service: OB/GYN;  Laterality: Left;    ROBOT-ASSISTED LAPAROSCOPIC SURGICAL REMOVAL OF OVARY USING DA DARVIN XI Right 5/13/2022    Procedure: XI ROBOTIC OOPHORECTOMY;  Surgeon: Charmaine Delacruz MD;  Location: [facility];  Service: OB/GYN;  Laterality: Right;    ROBOT-ASSISTED SURGICAL REMOVAL OF FALLOPIAN TUBE USING DA DARVIN XI Bilateral 5/13/2022    Procedure: XI ROBOTIC SALPINGECTOMY;  Surgeon: Charmaine Delacruz MD;  Location: [facility];  Service: OB/GYN;  Laterality: Bilateral;       Family History:   Family History   Problem Relation Age of Onset    Breast cancer Maternal Aunt     Lung cancer Paternal Uncle     Colon cancer Neg Hx     Ovarian cancer Neg Hx        Social History:  reports that she has never smoked. She has never used smokeless tobacco. She reports current alcohol use. She reports that she does not use drugs.    Allergies:  Review of patient's allergies indicates:  No Known Allergies    Medications:  Current Outpatient Medications   Medication Sig Dispense Refill    cetirizine (ZYRTEC) 10 MG tablet Take by mouth once daily.      ketotifen (ZADITOR) 0.025 % (0.035 %) ophthalmic solution Place 1 drop into both eyes 3 (three) times daily.      olopatadine (PATADAY) 0.2 % Drop Place 1 drop into both eyes once daily. 2.5 mL 5     No current facility-administered medications for this visit.       Review of Systems   Constitutional: Negative for appetite change, chills, fatigue, fever and unexpected weight change.   HENT: Negative for mouth sores.    Eyes: Negative for visual disturbance.   Respiratory: Negative for cough and shortness of breath.    Cardiovascular: Negative for chest pain and palpitations.   Gastrointestinal: Negative for abdominal pain, constipation, diarrhea, nausea and  "vomiting.   Genitourinary: Positive for pelvic pain (occasional sharp). Negative for frequency.   Musculoskeletal: Negative for back pain.   Skin: Negative for rash.   Neurological: Negative for headaches.   Hematological: Negative for adenopathy. Does not bruise/bleed easily.   Psychiatric/Behavioral: The patient is not nervous/anxious.        ECOG Performance Status: 0   Objective:      Vitals:   Vitals:    08/04/22 0911   BP: 109/77   BP Location: Left arm   Patient Position: Sitting   Pulse: 84   Resp: 17   Temp: 98.3 °F (36.8 °C)   TempSrc: Temporal   SpO2: 100%   Weight: 48.2 kg (106 lb 4.2 oz)   Height: 5' 1" (1.549 m)       Physical Exam  Constitutional:       General: She is not in acute distress.     Appearance: She is well-developed. She is not diaphoretic.   HENT:      Head: Normocephalic and atraumatic.   Cardiovascular:      Rate and Rhythm: Normal rate and regular rhythm.      Heart sounds: Normal heart sounds. No murmur heard.    No friction rub. No gallop.   Pulmonary:      Effort: Pulmonary effort is normal. No respiratory distress.      Breath sounds: Normal breath sounds. No wheezing or rales.   Chest:      Chest wall: No tenderness.   Breasts:      Right: No axillary adenopathy or supraclavicular adenopathy.      Left: No axillary adenopathy or supraclavicular adenopathy.       Abdominal:      General: Bowel sounds are normal. There is no distension.      Palpations: Abdomen is soft. There is no mass.      Tenderness: There is no abdominal tenderness. There is no guarding or rebound.   Lymphadenopathy:      Cervical: No cervical adenopathy.      Upper Body:      Right upper body: No supraclavicular or axillary adenopathy.      Left upper body: No supraclavicular or axillary adenopathy.   Skin:     Findings: No erythema or rash.   Neurological:      Mental Status: She is alert and oriented to person, place, and time.   Psychiatric:         Behavior: Behavior normal.         Laboratory Data:  No " visits with results within 1 Week(s) from this visit.   Latest known visit with results is:   Lab Visit on 07/07/2022   Component Date Value Ref Range Status    WBC 07/07/2022 4.06  3.90 - 12.70 K/uL Final    RBC 07/07/2022 4.06  4.00 - 5.40 M/uL Final    Hemoglobin 07/07/2022 12.9  12.0 - 16.0 g/dL Final    Hematocrit 07/07/2022 38.5  37.0 - 48.5 % Final    MCV 07/07/2022 95  82 - 98 fL Final    MCH 07/07/2022 31.8 (A) 27.0 - 31.0 pg Final    MCHC 07/07/2022 33.5  32.0 - 36.0 g/dL Final    RDW 07/07/2022 11.2 (A) 11.5 - 14.5 % Final    Platelets 07/07/2022 266  150 - 450 K/uL Final    MPV 07/07/2022 9.0 (A) 9.2 - 12.9 fL Final    Immature Granulocytes 07/07/2022 0.0  0.0 - 0.5 % Final    Gran # (ANC) 07/07/2022 1.7 (A) 1.8 - 7.7 K/uL Final    Immature Grans (Abs) 07/07/2022 0.00  0.00 - 0.04 K/uL Final    Comment: Mild elevation in immature granulocytes is non specific and   can be seen in a variety of conditions including stress response,   acute inflammation, trauma and pregnancy. Correlation with other   laboratory and clinical findings is essential.      Lymph # 07/07/2022 1.8  1.0 - 4.8 K/uL Final    Mono # 07/07/2022 0.4  0.3 - 1.0 K/uL Final    Eos # 07/07/2022 0.2  0.0 - 0.5 K/uL Final    Baso # 07/07/2022 0.03  0.00 - 0.20 K/uL Final    nRBC 07/07/2022 0  0 /100 WBC Final    Gran % 07/07/2022 41.5  38.0 - 73.0 % Final    Lymph % 07/07/2022 43.8  18.0 - 48.0 % Final    Mono % 07/07/2022 8.6  4.0 - 15.0 % Final    Eosinophil % 07/07/2022 5.4  0.0 - 8.0 % Final    Basophil % 07/07/2022 0.7  0.0 - 1.9 % Final    Differential Method 07/07/2022 Automated   Final    Sodium 07/07/2022 141  136 - 145 mmol/L Final    Potassium 07/07/2022 3.5  3.5 - 5.1 mmol/L Final    Chloride 07/07/2022 105  95 - 110 mmol/L Final    CO2 07/07/2022 25  23 - 29 mmol/L Final    Glucose 07/07/2022 69 (A) 70 - 110 mg/dL Final    BUN 07/07/2022 5 (A) 6 - 20 mg/dL Final    Creatinine 07/07/2022 0.6  0.5 -  1.4 mg/dL Final    Calcium 07/07/2022 9.3  8.7 - 10.5 mg/dL Final    Total Protein 07/07/2022 8.1  6.0 - 8.4 g/dL Final    Albumin 07/07/2022 4.1  3.5 - 5.2 g/dL Final    Total Bilirubin 07/07/2022 0.4  0.1 - 1.0 mg/dL Final    Comment: For infants and newborns, interpretation of results should be based  on gestational age, weight and in agreement with clinical  observations.    Premature Infant recommended reference ranges:  Up to 24 hours.............<8.0 mg/dL  Up to 48 hours............<12.0 mg/dL  3-5 days..................<15.0 mg/dL  6-29 days.................<15.0 mg/dL      Alkaline Phosphatase 07/07/2022 53 (A) 55 - 135 U/L Final    AST 07/07/2022 26  10 - 40 U/L Final    ALT 07/07/2022 46 (A) 10 - 44 U/L Final    Anion Gap 07/07/2022 11  8 - 16 mmol/L Final    eGFR if African American 07/07/2022 >60  >60 mL/min/1.73 m^2 Final    eGFR if non African American 07/07/2022 >60  >60 mL/min/1.73 m^2 Final    Comment: Calculation used to obtain the estimated glomerular filtration  rate (eGFR) is the CKD-EPI equation.       Iron 07/07/2022 127  30 - 160 ug/dL Final    Transferrin 07/07/2022 212  200 - 375 mg/dL Final    TIBC 07/07/2022 314  250 - 450 ug/dL Final    Saturated Iron 07/07/2022 40  20 - 50 % Final    Ferritin 07/07/2022 172  20.0 - 300.0 ng/mL Final     07/07/2022 11  0 - 30 U/mL Final    Comment: The testing method is a chemiluminescent microparticle immunoassay   manufactured by [employer] and performed on the [employer]   or   [employer] system. Values obtained with different assay manufacturers   for   methods may be different and cannot be used interchangeably.           Imaging:     PET/CT 6/29/22  Neck: There is again diffuse activity throughout both lobes of the thyroid gland suggesting thyroiditis.  No new sites of uptake are present in the neck.  There is no cervical adenopathy.     Chest: There is no abnormal FDG uptake in the thorax.  No mediastinal nor  axillary adenopathy is present.  There is no pleural effusion.  The aorta is normal in caliber.     The lungs are clear.     Abdomen/pelvis: There is no abnormal FDG uptake in the abdomen or pelvis.  Since the prior PET-CT the patient has undergone hysterectomy, right salpingo-oophorectomy, bilateral pelvic and periaortic lymph node dissection, omentectomy, peritoneal biopsy and appendectomy.  Limited noncontrast CT images of the liver, gallbladder, spleen, adrenal glands, pancreas and kidneys are normal.  The aorta is normal in caliber.  Urinary bladder is collapsed.  There is a trace quantity of pelvic free fluid.  No abnormalities are seen in the bowel.  There are bilateral inguinal lymph nodes which have increased in size by a few mm compared to the prior PET-CT but still remain within the normal size range.  The largest has a short axis diameter of 9 mm series 3, image 221.  There is no associated FDG uptake.  These are likely residual reactive nodes in this patient with a history of a postop pelvic abscess.  There is a 17 x 14 mm fluid density structure along the right pelvic sidewall which was  previously significantly larger measuring 4.3 x 2.5 cm on the CT of 05/20/2022.  This is consistent with a small seroma or lymphocele.     Bones: There is no abnormal FDG uptake in the bones; there is a stable circumscribed sclerotic lesion in the left posterior iliac bone series 3, image 193 which is likely a benign, incidental finding, perhaps a large bone island.     Assessment:       1. Malignant tumor of unknown origin    2. Normocytic anemia    3. Thyroiditis    4. 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  -Molecular markers were suggestive of intestinal origin  -Patient underwent colonoscopy and EGD without pathology found  -In addition patient underwent complete hysterectomy with right salpingo oophorectomy and  extensive abdominal surgery without additional disease found  -Repeat PET/CT 7/05/22 showed no residual disease  -EUS on 8/03/22 showed no disease in the esophagus, stomach, pancreas, liver or vessels  -Liver lesion seen on EUS was discussed with Dr Echols today whom stated the [id number] lesion was a hemangioma  -Pt with presumed malignancy only in the ovary and would be classified as stage 1  -Treatment with FOLFOX discussed given suggestion of GI primary; however, the patient declined chemotherapy  -The patient understands there is a higher risk of recurrence without adjuvant treatment  -The patient wishes to participate in the [facility] [place] Trial where there will be additional molecular testing to determine origin and testing of circulating tumor DNA on initiation and every 3 months for a total of 2-5 years  -Will arrange a return appt with me in mid September with CBC and CMP  -Pt to potentially have results from the sentinel trial at that time    Normocytic Anemia - likely due to blood loss during prior surgeries given hemoglobin was normal 3/04/22  -Improved to 12.9 on 7/07/22  -Will monitor on repeat testing    Thyroiditis - PET/CT 7/05/22 showed continued thyroiditis  -Pt to see endocrinology 8/25/22    Pelvic Pain - pt with intermittent pelvic pain since prior surgeries  -Pt wishes to see Dr Delacruz again in consultation  -Will arrange a follow up appt with Gynecologist/Oncologist Dr Delacruz    Route Chart for Scheduling    Med Onc Chart Routing      Follow up with physician Other. The patient needs a follow up in September when the results of the [place] Trial are available with CBC and CMP prior to the visit.  The patient will need a return appt with Gynecologist/Oncologist Dr Delacruz.   Follow up with YADIRA    Infusion scheduling note    Injection scheduling note    Labs    Imaging    Pharmacy appointment    Other referrals               Jameson Hou MD  [facility]  Hematology and Oncology  [facility] [address] [facility] [place] [address]   O: [phone]  F: [phone]

## 2022-08-15 ENCOUNTER — RESEARCH ENCOUNTER (OUTPATIENT)
Dept: INFUSION THERAPY | Facility: HOSPITAL | Age: 32
End: 2022-08-15
Payer: COMMERCIAL

## 2022-08-15 NOTE — PROGRESS NOTES
Patient Name:   Francis Nagel  MRN [id number]  [id number] (SENTINEL)  IRB# [id number]  Date: August 15, 2022    This CRC phoned the patient to follow up regarding her decision to enroll in the above-referenced study and ask if she had any questions since the August 4th, 2022, review of the informed consent.  The patient reported being busy since the 8/4/2022 review and has not determined if she wants to enroll in the study. However, the patient said she would call back this week with her decision.  The patient denied having any questions and admitted she had the phone number of the [facility] research office.    Dr. Hou was notified via secure chat.    CHRISTIE Abdullahi RN

## 2022-08-25 ENCOUNTER — RESEARCH ENCOUNTER (OUTPATIENT)
Dept: RESEARCH | Facility: HOSPITAL | Age: 32
End: 2022-08-25
Payer: COMMERCIAL

## 2022-08-25 DIAGNOSIS — Z00.6 EXAMINATION OF PARTICIPANT IN CLINICAL TRIAL: ICD-10-CM

## 2022-08-25 DIAGNOSIS — C80.1: Primary | ICD-10-CM

## 2022-08-25 NOTE — PROGRESS NOTES
Patient Name: Francis Nagel  MRN:[id number] (SENTINEL)  IRB# [id number]  Date: 8/25/2022  PID: [id number]    INFORMED CONSENT SINGING & BASELINE VISIT    The patient and her mother-in-law arrived at the [facility] today to meet with this CRC to sign consent form and have baseline blood drawn. The patient's treating physician, Dr. Hou, discussed enrolling in the [place] trial with the patient on 8/4/22 and Mandy Abdullahi RN reviewed informed consent with the patient (see 8/4/22 CRC note).     Met with the patient and her mother-in-law in first floor laboratory space and asked if she was able to review the consent with family and if she had any questions. The patient said she discussed the trial with family and agreed she would participate. This CRC gave the patient and her mother-in-law many opportunities to ask questions. Both the patient and her mother-in-law denied having any additional questions or concerns. The patient signed the informed consent and desired to proceed with the baseline blood sample collection. A copy of the informed consent was provided to the patient. Blood sample was collected by phlebotomist using two patient identifiers with CRC present.      The following was completed after consent was signed:  Demographic Data Entered into EDC.  Blood & tissue requisition created online through [facility].   Baseline Blood collected and shipped on 8/25/22  Tissue Procurement Requested on 8/25/22    Next study appt:    3-month MRD blood collection and follow up visit due November 25th, 2022 (+/- 15 days)    Crys Ross [facility]

## 2022-09-20 ENCOUNTER — TELEPHONE (OUTPATIENT)
Dept: HEMATOLOGY/ONCOLOGY | Facility: CLINIC | Age: 32
End: 2022-09-20
Payer: COMMERCIAL

## 2022-09-20 NOTE — TELEPHONE ENCOUNTER
Dr Hou wanted to see if the patient wanted to come in earlier because MRI results came back earlier than expected, Patient wants to keep the current appointment on 10/06/2022

## 2022-09-20 NOTE — TELEPHONE ENCOUNTER
"Received phone call from patient.  States returning phone to Dr. Jameson Hou.  "He just called a while ago"    Instructed this RN will pass along message to Dr. Hou  Pt &  verbally acknowledged understanding  "

## 2022-10-05 ENCOUNTER — TELEPHONE (OUTPATIENT)
Dept: INFUSION THERAPY | Facility: HOSPITAL | Age: 32
End: 2022-10-05
Payer: COMMERCIAL

## 2022-10-05 NOTE — TELEPHONE ENCOUNTER
Left message for patient to call us to reschedule with Dr roberto she cancel 10/06/22 sonu on my [facility]

## 2022-10-14 ENCOUNTER — OFFICE VISIT (OUTPATIENT)
Dept: HEMATOLOGY/ONCOLOGY | Facility: CLINIC | Age: 32
End: 2022-10-14
Payer: COMMERCIAL

## 2022-10-14 VITALS
BODY MASS INDEX: 21.14 KG/M2 | WEIGHT: 112 LBS | HEART RATE: 87 BPM | OXYGEN SATURATION: 100 % | HEIGHT: 61 IN | DIASTOLIC BLOOD PRESSURE: 70 MMHG | SYSTOLIC BLOOD PRESSURE: 106 MMHG | TEMPERATURE: 97 F

## 2022-10-14 DIAGNOSIS — C80.1: Primary | ICD-10-CM

## 2022-10-14 DIAGNOSIS — E06.9 THYROIDITIS: ICD-10-CM

## 2022-10-14 DIAGNOSIS — R10.2 PELVIC PAIN: ICD-10-CM

## 2022-10-14 DIAGNOSIS — D64.9 NORMOCYTIC ANEMIA: ICD-10-CM

## 2022-10-14 DIAGNOSIS — Z00.6 EXAMINATION OF PARTICIPANT IN CLINICAL TRIAL: Primary | ICD-10-CM

## 2022-10-14 PROCEDURE — 3074F PR MOST RECENT SYSTOLIC BLOOD PRESSURE < 130 MM HG: ICD-10-PCS | Mod: CPTII,S$GLB,, | Performed by: INTERNAL MEDICINE

## 2022-10-14 PROCEDURE — 99215 OFFICE O/P EST HI 40 MIN: CPT | Mod: S$GLB,,, | Performed by: INTERNAL MEDICINE

## 2022-10-14 PROCEDURE — 3078F DIAST BP <80 MM HG: CPT | Mod: CPTII,S$GLB,, | Performed by: INTERNAL MEDICINE

## 2022-10-14 PROCEDURE — 99999 PR PBB SHADOW E&M-EST. PATIENT-LVL III: ICD-10-PCS | Mod: PBBFAC,,, | Performed by: INTERNAL MEDICINE

## 2022-10-14 PROCEDURE — 3074F SYST BP LT 130 MM HG: CPT | Mod: CPTII,S$GLB,, | Performed by: INTERNAL MEDICINE

## 2022-10-14 PROCEDURE — 1159F MED LIST DOCD IN RCRD: CPT | Mod: CPTII,S$GLB,, | Performed by: INTERNAL MEDICINE

## 2022-10-14 PROCEDURE — 1159F PR MEDICATION LIST DOCUMENTED IN MEDICAL RECORD: ICD-10-PCS | Mod: CPTII,S$GLB,, | Performed by: INTERNAL MEDICINE

## 2022-10-14 PROCEDURE — 3078F PR MOST RECENT DIASTOLIC BLOOD PRESSURE < 80 MM HG: ICD-10-PCS | Mod: CPTII,S$GLB,, | Performed by: INTERNAL MEDICINE

## 2022-10-14 PROCEDURE — 99999 PR PBB SHADOW E&M-EST. PATIENT-LVL III: CPT | Mod: PBBFAC,,, | Performed by: INTERNAL MEDICINE

## 2022-10-14 PROCEDURE — 99215 PR OFFICE/OUTPT VISIT, EST, LEVL V, 40-54 MIN: ICD-10-PCS | Mod: S$GLB,,, | Performed by: INTERNAL MEDICINE

## 2022-10-14 NOTE — PROGRESS NOTES
PATIENT: Francis Nagel  MRN: [id number]  DATE: 10/14/2022      Diagnosis:   1. Malignant tumor of unknown origin    2. Normocytic anemia    3. Thyroiditis    4. Pelvic pain          Chief Complaint: Follow-up (Cancer of Unknown Origin)      Oncologic History:      Oncologic History 2/04/22 US Pelvis  2/15/22 CT Abdomen and Pelvis  3/10/22 robotic salpingo oophorectomy of the left ovary  4/11/22 EGD Colonoscopy  4/21/22 PET/CT  5/13/22 robotic assisted total laparoscopic hysterectomy, right salpingo oophorectomy, bilateral pelvic and periaortic lymph node dissection, omentectomy, peritoneal biopsies, and appendectomy  6/29/22 - PET/CT  8/03/22 - EUS    Oncologic Treatment 3/10/22 - robotic salpingo oophorectomy of the left ovary    Pathology 3/10/22 adenocarcinoma intestinal phenotype CK7+, 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  5/13/22  no sign of malignancy and 16 negative lymph nodes      The patient initially presented to see Dr Johnson on 2/03/22 with complaint of pelvic pain.  US of the pelvis on 2/04/22 showed a 15.7 x 7.9 x 11.1 complex multi-septated cystic right ovarian mass replacing the entire right ovary.  CT of the abdomen and pelvis on 02/15/2022 showed a large complex pelvic mass centered above an left lateral to the uterus measuring 16.7 cm in greatest dimension.  The patient then saw gynecologist oncologist Dr. Cintron on 02/16/2022.  She then saw Dr. Delacruz on 02/21/2022.  The patient then underwent robotic salpingo oophorectomy of the left ovary under the care of Dr. 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  The patient then underwent EGD and colonoscopy on 04/11/2022 showing gastritis, antritis, and internal hemorrhoids.  PET-CT performed 04/21/2022 showed mildly increased uptake in the thyroid g gland bilaterally in keeping with thyroiditis; multifocal activity in the right adnexal region with maximum SUV of 6.6 atypical for physiologic  ovarian uptake in a premenopausal woman; punctate area of low-grade FDG activity localized to the left subserosal aspect of the uterus; diffusely increased uptake in the gastric cardia and body without discrete nodularity or mass.  The patient then underwent robotic assisted total laparoscopic hysterectomy, right salpingo oophorectomy, bilateral pelvic and periaortic lymph node dissection, omentectomy, peritoneal biopsies, and appendectomy under the care of Dr. Delacruz on 05/13/2022 with pathology showing no sign of malignancy and 16 negative lymph nodes.  The patient was then admitted from [address] [place] [address] for postoperative pelvic abscess with placement of peritoneal drain.  Cultures grew out Klebsiella with sensitivities to Bactrim.  Patient was discussed at tumor Board on 06/08/2022 with recommendations to check CEA and CA 19 9, obtained MRI abdomen and pelvis with MRCP and hold adjuvant chemotherapy pending further workup.  The patient underwent MRCP on 06/22/2022 showing 11 mm subcapsular mass in segment 3 of the liver concerning for benign cavernous hemangioma; similar lesion in segment 4A measuring 6 mm; stable with respect of prior scan on 02/15/2022.  MRCP sequence a demonstrated normal hepatic biliary structures with normal pancreatic duct.   The patient underwent PET-CT on 06/29/2022 showing 17 x 14 mm fluid density along the right pelvic sidewall likely residual fluid from postop abscess consistent with small seroma or lymphocele and no evidence of recurrence or metastatic disease.   The patient underwent EUS with Dr Echols on 8/03/22 showing a lesion in the left lobe of the liver measuring [id number] and normal appearance of the pancreas, gallbladder, esophagus, stomach, duodenum and vessels.    Subjective:    Interval History:  Ms. Nagel is a 32 y.o. female with anemia who presents for work up of metastatic cancer.  Since the last clinic visit the patient underwent initiation into the STRATA SENTINEL trial.   Tumor assessed on 8/26/22 showed a TP53 mutation as well as variance of unknown significance with GRM8, MAGEA1, NLRP1, PLAG1, TET1.  Blood collected 8/25/22 showed no tumor molecules detected.  The patient denies any recurrent pelvic pain.  The patient denies CP, cough, SOB, abdominal pain, N/V, constipation, diarrhea.  The patient denies fever, chills, night sweats, weight loss, new lumps or bumps, easy bruising or bleeding.    Past Medical History:   Past Medical History:   Diagnosis Date    Anemia 03/11/2022    Encounter for blood transfusion 03/11/2022    Mass of left ovary 03/10/2022    JOSE MIGUEL.     LEFT OVARY SHOWS ADENOCARCINOMA, INTESTINAL PHENOTYPE MEASURING 15 CM    Metastatic cancer 4/11/2022    PONV (postoperative nausea and vomiting)     S/P unilateral salpingo-oophorectomy/mass resection 03/10/2022       Past Surgical HIstory:   Past Surgical History:   Procedure Laterality Date    COLONOSCOPY N/A 4/11/2022    Procedure: COLONOSCOPY;  Surgeon: Eric Meléndez Jr., MD;  Location: [facility];  Service: Endoscopy;  Laterality: N/A;    ENDOSCOPIC ULTRASOUND OF UPPER GASTROINTESTINAL TRACT Left 8/3/2022    Procedure: ULTRASOUND, UPPER GI TRACT, ENDOSCOPIC;  Surgeon: Richar Echols MD;  Location: [facility];  Service: Endoscopy;  Laterality: Left;    ESOPHAGOGASTRODUODENOSCOPY N/A 4/11/2022    Procedure: EGD (ESOPHAGOGASTRODUODENOSCOPY);  Surgeon: Eric Meléndez Jr., MD;  Location: [facility];  Service: Endoscopy;  Laterality: N/A;    ESOPHAGOGASTRODUODENOSCOPY N/A 8/3/2022    Procedure: EGD (ESOPHAGOGASTRODUODENOSCOPY);  Surgeon: Richar Echols MD;  Location: [facility];  Service: Endoscopy;  Laterality: N/A;    ROBOT-ASSISTED APPENDECTOMY  5/13/2022    Procedure: ROBOTIC APPENDECTOMY;  Surgeon: Charmaine Delacruz MD;  Location: [facility];  Service: OB/GYN;;    ROBOT-ASSISTED LAPAROSCOPIC ABDOMINAL HYSTERECTOMY USING DA DARVIN XI N/A 5/13/2022    Procedure: XI ROBOTIC HYSTERECTOMY;  Surgeon: Charmaine BURGER  MD Jayme;  Location: [facility];  Service: OB/GYN;  Laterality: N/A;    ROBOT-ASSISTED LAPAROSCOPIC OMENTECTOMY USING DA DARVIN XI N/A 5/13/2022    Procedure: XI ROBOTIC OMENTECTOMY;  Surgeon: Charmaine Delacruz MD;  Location: [facility];  Service: OB/GYN;  Laterality: N/A;    ROBOT-ASSISTED LAPAROSCOPIC SALPINGO-OOPHORECTOMY USING DA DARVIN XI Left 3/10/2022    Procedure: XI ROBOTIC SALPINGO-OOPHORECTOMY/ USO;  Surgeon: Charmaine Delacruz MD;  Location: [address];  Service: OB/GYN;  Laterality: Left;    ROBOT-ASSISTED LAPAROSCOPIC SURGICAL REMOVAL OF OVARY USING DA DARVIN XI Right 5/13/2022    Procedure: XI ROBOTIC OOPHORECTOMY;  Surgeon: Charmaine Delacruz MD;  Location: [facility];  Service: OB/GYN;  Laterality: Right;    ROBOT-ASSISTED SURGICAL REMOVAL OF FALLOPIAN TUBE USING DA DARVIN XI Bilateral 5/13/2022    Procedure: XI ROBOTIC SALPINGECTOMY;  Surgeon: Charmaine Delacruz MD;  Location: [facility];  Service: OB/GYN;  Laterality: Bilateral;       Family History:   Family History   Problem Relation Age of Onset    Breast cancer Maternal Aunt     Lung cancer Paternal Uncle     Colon cancer Neg Hx     Ovarian cancer Neg Hx        Social History:  reports that she has never smoked. She has never used smokeless tobacco. She reports current alcohol use. She reports that she does not use drugs.    Allergies:  Review of patient's allergies indicates:  No Known Allergies    Medications:  Current Outpatient Medications   Medication Sig Dispense Refill    cetirizine (ZYRTEC) 10 MG tablet Take by mouth once daily.      ketotifen (ZADITOR) 0.025 % (0.035 %) ophthalmic solution Place 1 drop into both eyes 3 (three) times daily.      olopatadine (PATADAY) 0.2 % Drop Place 1 drop into both eyes once daily. 2.5 mL 5     No current facility-administered medications for this visit.       Review of Systems   Constitutional:  Positive for diaphoresis (on occasion). Negative for appetite change, chills, fatigue, fever and unexpected weight change.  "  HENT:  Negative for mouth sores.    Eyes:  Negative for visual disturbance.   Respiratory:  Negative for cough and shortness of breath.    Cardiovascular:  Negative for chest pain and palpitations.   Gastrointestinal:  Negative for abdominal pain, constipation, diarrhea, nausea and vomiting.   Genitourinary:  Negative for frequency and pelvic pain.   Skin:  Negative for rash.   Neurological:  Negative for headaches.   Hematological:  Negative for adenopathy. Does not bruise/bleed easily.     ECOG Performance Status: 0   Objective:      Vitals:   Vitals:    10/14/22 1021   BP: 106/70   BP Location: Left arm   Patient Position: Sitting   BP Method: Medium (Manual)   Pulse: 87   Temp: 96.7 °F (35.9 °C)   TempSrc: Temporal   SpO2: 100%   Weight: 50.8 kg (111 lb 15.9 oz)   Height: 5' 1" (1.549 m)       Physical Exam  Constitutional:       General: She is not in acute distress.     Appearance: She is well-developed. She is not diaphoretic.   HENT:      Head: Normocephalic and atraumatic.   Cardiovascular:      Rate and Rhythm: Normal rate and regular rhythm.      Heart sounds: Normal heart sounds. No murmur heard.    No friction rub. No gallop.   Pulmonary:      Effort: Pulmonary effort is normal. No respiratory distress.      Breath sounds: Normal breath sounds. No wheezing or rales.   Chest:      Chest wall: No tenderness.   Abdominal:      General: Bowel sounds are normal. There is no distension.      Palpations: Abdomen is soft. There is no mass.      Tenderness: There is no abdominal tenderness. There is no guarding or rebound.   Lymphadenopathy:      Cervical: No cervical adenopathy.      Upper Body:      Right upper body: No supraclavicular or axillary adenopathy.      Left upper body: No supraclavicular or axillary adenopathy.   Skin:     Findings: No erythema or rash.   Neurological:      Mental Status: She is alert and oriented to person, place, and time.   Psychiatric:         Behavior: Behavior normal. "       Laboratory Data:  No visits with results within 1 Week(s) from this visit.   Latest known visit with results is:   Lab Visit on 07/07/2022   Component Date Value Ref Range Status    WBC 07/07/2022 4.06  3.90 - 12.70 K/uL Final    RBC 07/07/2022 4.06  4.00 - 5.40 M/uL Final    Hemoglobin 07/07/2022 12.9  12.0 - 16.0 g/dL Final    Hematocrit 07/07/2022 38.5  37.0 - 48.5 % Final    MCV 07/07/2022 95  82 - 98 fL Final    MCH 07/07/2022 31.8 (H)  27.0 - 31.0 pg Final    MCHC 07/07/2022 33.5  32.0 - 36.0 g/dL Final    RDW 07/07/2022 11.2 (L)  11.5 - 14.5 % Final    Platelets 07/07/2022 266  150 - 450 K/uL Final    MPV 07/07/2022 9.0 (L)  9.2 - 12.9 fL Final    Immature Granulocytes 07/07/2022 0.0  0.0 - 0.5 % Final    Gran # (ANC) 07/07/2022 1.7 (L)  1.8 - 7.7 K/uL Final    Immature Grans (Abs) 07/07/2022 0.00  0.00 - 0.04 K/uL Final    Comment: Mild elevation in immature granulocytes is non specific and   can be seen in a variety of conditions including stress response,   acute inflammation, trauma and pregnancy. Correlation with other   laboratory and clinical findings is essential.      Lymph # 07/07/2022 1.8  1.0 - 4.8 K/uL Final    Mono # 07/07/2022 0.4  0.3 - 1.0 K/uL Final    Eos # 07/07/2022 0.2  0.0 - 0.5 K/uL Final    Baso # 07/07/2022 0.03  0.00 - 0.20 K/uL Final    nRBC 07/07/2022 0  0 /100 WBC Final    Gran % 07/07/2022 41.5  38.0 - 73.0 % Final    Lymph % 07/07/2022 43.8  18.0 - 48.0 % Final    Mono % 07/07/2022 8.6  4.0 - 15.0 % Final    Eosinophil % 07/07/2022 5.4  0.0 - 8.0 % Final    Basophil % 07/07/2022 0.7  0.0 - 1.9 % Final    Differential Method 07/07/2022 Automated   Final    Sodium 07/07/2022 141  136 - 145 mmol/L Final    Potassium 07/07/2022 3.5  3.5 - 5.1 mmol/L Final    Chloride 07/07/2022 105  95 - 110 mmol/L Final    CO2 07/07/2022 25  23 - 29 mmol/L Final    Glucose 07/07/2022 69 (L)  70 - 110 mg/dL Final    BUN 07/07/2022 5 (L)  6 - 20 mg/dL Final    Creatinine 07/07/2022 0.6  0.5 -  1.4 mg/dL Final    Calcium 07/07/2022 9.3  8.7 - 10.5 mg/dL Final    Total Protein 07/07/2022 8.1  6.0 - 8.4 g/dL Final    Albumin 07/07/2022 4.1  3.5 - 5.2 g/dL Final    Total Bilirubin 07/07/2022 0.4  0.1 - 1.0 mg/dL Final    Comment: For infants and newborns, interpretation of results should be based  on gestational age, weight and in agreement with clinical  observations.    Premature Infant recommended reference ranges:  Up to 24 hours.............<8.0 mg/dL  Up to 48 hours............<12.0 mg/dL  3-5 days..................<15.0 mg/dL  6-29 days.................<15.0 mg/dL      Alkaline Phosphatase 07/07/2022 53 (L)  55 - 135 U/L Final    AST 07/07/2022 26  10 - 40 U/L Final    ALT 07/07/2022 46 (H)  10 - 44 U/L Final    Anion Gap 07/07/2022 11  8 - 16 mmol/L Final    eGFR if African American 07/07/2022 >60  >60 mL/min/1.73 m^2 Final    eGFR if non African American 07/07/2022 >60  >60 mL/min/1.73 m^2 Final    Comment: Calculation used to obtain the estimated glomerular filtration  rate (eGFR) is the CKD-EPI equation.       Iron 07/07/2022 127  30 - 160 ug/dL Final    Transferrin 07/07/2022 212  200 - 375 mg/dL Final    TIBC 07/07/2022 314  250 - 450 ug/dL Final    Saturated Iron 07/07/2022 40  20 - 50 % Final    Ferritin 07/07/2022 172  20.0 - 300.0 ng/mL Final     07/07/2022 11  0 - 30 U/mL Final    Comment: The testing method is a chemiluminescent microparticle immunoassay   manufactured by [employer] and performed on the [employer]   or   [employer] system. Values obtained with different assay manufacturers   for   methods may be different and cannot be used interchangeably.           Imaging:     PET/CT 6/29/22  Neck: There is again diffuse activity throughout both lobes of the thyroid gland suggesting thyroiditis.  No new sites of uptake are present in the neck.  There is no cervical adenopathy.     Chest: There is no abnormal FDG uptake in the thorax.  No mediastinal nor axillary adenopathy  is present.  There is no pleural effusion.  The aorta is normal in caliber.     The lungs are clear.     Abdomen/pelvis: There is no abnormal FDG uptake in the abdomen or pelvis.  Since the prior PET-CT the patient has undergone hysterectomy, right salpingo-oophorectomy, bilateral pelvic and periaortic lymph node dissection, omentectomy, peritoneal biopsy and appendectomy.  Limited noncontrast CT images of the liver, gallbladder, spleen, adrenal glands, pancreas and kidneys are normal.  The aorta is normal in caliber.  Urinary bladder is collapsed.  There is a trace quantity of pelvic free fluid.  No abnormalities are seen in the bowel.  There are bilateral inguinal lymph nodes which have increased in size by a few mm compared to the prior PET-CT but still remain within the normal size range.  The largest has a short axis diameter of 9 mm series 3, image 221.  There is no associated FDG uptake.  These are likely residual reactive nodes in this patient with a history of a postop pelvic abscess.  There is a 17 x 14 mm fluid density structure along the right pelvic sidewall which was  previously significantly larger measuring 4.3 x 2.5 cm on the CT of 05/20/2022.  This is consistent with a small seroma or lymphocele.     Bones: There is no abnormal FDG uptake in the bones; there is a stable circumscribed sclerotic lesion in the left posterior iliac bone series 3, image 193 which is likely a benign, incidental finding, perhaps a large bone island.     Assessment:       1. Malignant tumor of unknown origin    2. Normocytic anemia    3. Thyroiditis    4. 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  -Molecular markers were suggestive of intestinal origin  -Patient underwent colonoscopy and EGD without pathology found  -In addition patient underwent complete hysterectomy with right salpingo oophorectomy and extensive abdominal  surgery without additional disease found  -Repeat PET/CT 7/05/22 showed no residual disease  -EUS on 8/03/22 showed no disease in the esophagus, stomach, pancreas, liver or vessels  -Liver lesion seen on EUS was discussed with Dr Echols today whom stated the [id number] lesion was a hemangioma  -Pt with presumed malignancy only in the ovary and would be classified as stage 1  -Treatment with FOLFOX discussed given suggestion of GI primary; however, the patient declined chemotherapy  -The patient understood that there is a higher risk of recurrence without adjuvant treatment  -The patient was enrolled in the STRATA [place] Trial   -Analysis done on 8/26/22 of the tumor removed on 3/10/22 showed a TP53 mutation as well as variance of unknown significance with GRM8, MAGEA1, NLRP1, PLAG1, TET1  -Blood collected 8/25/22 showed no tumor molecules detected.  -Pt to undergo repeat blood test for SENTINEL trial for circulating DNA on 11/03/22  -Will repeat staging scans in 2 weeks with return visit the following week    Normocytic Anemia - likely due to blood loss during prior surgeries given hemoglobin was normal 3/04/22  -Will monitor on repeat testing next month    Thyroiditis - PET/CT 7/05/22 showed continued thyroiditis  -Pt cancelled her appt on 8/25/22  -Will monitor for symptoms    Pelvic Pain - improved  -Will monitor    Route Chart for Scheduling    Med Onc Chart Routing      Follow up with physician Other. The patient needs blood work for [place]arrin SETINEL trial drawn 11/03/22.  She will need a CT chest, abdomen and pelvis the week of 11/21/22 along with CBC and CMP.  She will need an appt with me the week after the CT scan.   Follow up with YADIRA    Infusion scheduling note    Injection scheduling note    Labs    Imaging    Pharmacy appointment    Other referrals             Jameson Hou MD  [facility]  Hematology and Oncology  [facility]   [address] [place] [address]   O:  [phone]  F: [phone]

## 2022-10-26 ENCOUNTER — TELEPHONE (OUTPATIENT)
Dept: RESEARCH | Facility: HOSPITAL | Age: 32
End: 2022-10-26
Payer: COMMERCIAL

## 2022-10-26 ENCOUNTER — PATIENT MESSAGE (OUTPATIENT)
Dept: RESEARCH | Facility: HOSPITAL | Age: 32
End: 2022-10-26
Payer: COMMERCIAL

## 2022-10-26 NOTE — TELEPHONE ENCOUNTER
10/26/22    11:20 AM    Called to remind/confirm the patient's lab appt on 11/3/22 to have next MRD testing blood drawn for the [place] trial. The patient did not  and does not have a voicemail set up.    Crys Ross [facility]

## 2022-11-03 ENCOUNTER — RESEARCH ENCOUNTER (OUTPATIENT)
Dept: RESEARCH | Facility: HOSPITAL | Age: 32
End: 2022-11-03
Payer: COMMERCIAL

## 2022-11-21 ENCOUNTER — HOSPITAL ENCOUNTER (OUTPATIENT)
Dept: RADIOLOGY | Facility: HOSPITAL | Age: 32
Discharge: HOME | End: 2022-11-21
Attending: INTERNAL MEDICINE
Payer: COMMERCIAL

## 2022-11-21 DIAGNOSIS — C80.1: ICD-10-CM

## 2022-11-21 PROCEDURE — A9698 NON-RAD CONTRAST MATERIALNOC: HCPCS | Mod: PO | Performed by: INTERNAL MEDICINE

## 2022-11-21 PROCEDURE — 74177 CT CHEST ABDOMEN PELVIS WITH CONTRAST (XPD): ICD-10-PCS | Mod: 26,,, | Performed by: RADIOLOGY

## 2022-11-21 PROCEDURE — 71260 CT THORAX DX C+: CPT | Mod: 26,,, | Performed by: RADIOLOGY

## 2022-11-21 PROCEDURE — 71260 CT CHEST ABDOMEN PELVIS WITH CONTRAST (XPD): ICD-10-PCS | Mod: 26,,, | Performed by: RADIOLOGY

## 2022-11-21 PROCEDURE — 74177 CT ABD & PELVIS W/CONTRAST: CPT | Mod: TC,PO

## 2022-11-21 PROCEDURE — 74177 CT ABD & PELVIS W/CONTRAST: CPT | Mod: 26,,, | Performed by: RADIOLOGY

## 2022-11-21 PROCEDURE — 25500020 PHARM REV CODE 255: Mod: PO | Performed by: INTERNAL MEDICINE

## 2022-11-21 PROCEDURE — 71260 CT THORAX DX C+: CPT | Mod: TC,PO

## 2022-11-21 RX ADMIN — IOHEXOL 75 ML: 350 INJECTION, SOLUTION INTRAVENOUS at 10:11

## 2022-11-21 RX ADMIN — BARIUM SULFATE 900 ML: 20 SUSPENSION ORAL at 11:11

## 2022-11-29 ENCOUNTER — RESEARCH ENCOUNTER (OUTPATIENT)
Dept: RESEARCH | Facility: HOSPITAL | Age: 32
End: 2022-11-29
Payer: COMMERCIAL

## 2022-11-29 ENCOUNTER — OFFICE VISIT (OUTPATIENT)
Dept: HEMATOLOGY/ONCOLOGY | Facility: CLINIC | Age: 32
End: 2022-11-29
Payer: COMMERCIAL

## 2022-11-29 VITALS
SYSTOLIC BLOOD PRESSURE: 98 MMHG | DIASTOLIC BLOOD PRESSURE: 70 MMHG | HEIGHT: 61 IN | HEART RATE: 89 BPM | WEIGHT: 111.31 LBS | BODY MASS INDEX: 21.02 KG/M2 | OXYGEN SATURATION: 99 % | TEMPERATURE: 98 F

## 2022-11-29 DIAGNOSIS — C80.1: Primary | ICD-10-CM

## 2022-11-29 DIAGNOSIS — R91.1 PULMONARY NODULE: ICD-10-CM

## 2022-11-29 PROCEDURE — 3074F PR MOST RECENT SYSTOLIC BLOOD PRESSURE < 130 MM HG: ICD-10-PCS | Mod: CPTII,S$GLB,, | Performed by: INTERNAL MEDICINE

## 2022-11-29 PROCEDURE — 3008F BODY MASS INDEX DOCD: CPT | Mod: CPTII,S$GLB,, | Performed by: INTERNAL MEDICINE

## 2022-11-29 PROCEDURE — 3078F PR MOST RECENT DIASTOLIC BLOOD PRESSURE < 80 MM HG: ICD-10-PCS | Mod: CPTII,S$GLB,, | Performed by: INTERNAL MEDICINE

## 2022-11-29 PROCEDURE — 1159F PR MEDICATION LIST DOCUMENTED IN MEDICAL RECORD: ICD-10-PCS | Mod: CPTII,S$GLB,, | Performed by: INTERNAL MEDICINE

## 2022-11-29 PROCEDURE — 3074F SYST BP LT 130 MM HG: CPT | Mod: CPTII,S$GLB,, | Performed by: INTERNAL MEDICINE

## 2022-11-29 PROCEDURE — 99214 PR OFFICE/OUTPT VISIT, EST, LEVL IV, 30-39 MIN: ICD-10-PCS | Mod: S$GLB,,, | Performed by: INTERNAL MEDICINE

## 2022-11-29 PROCEDURE — 1159F MED LIST DOCD IN RCRD: CPT | Mod: CPTII,S$GLB,, | Performed by: INTERNAL MEDICINE

## 2022-11-29 PROCEDURE — 99999 PR PBB SHADOW E&M-EST. PATIENT-LVL III: ICD-10-PCS | Mod: PBBFAC,,, | Performed by: INTERNAL MEDICINE

## 2022-11-29 PROCEDURE — 3078F DIAST BP <80 MM HG: CPT | Mod: CPTII,S$GLB,, | Performed by: INTERNAL MEDICINE

## 2022-11-29 PROCEDURE — 99214 OFFICE O/P EST MOD 30 MIN: CPT | Mod: S$GLB,,, | Performed by: INTERNAL MEDICINE

## 2022-11-29 PROCEDURE — 3008F PR BODY MASS INDEX (BMI) DOCUMENTED: ICD-10-PCS | Mod: CPTII,S$GLB,, | Performed by: INTERNAL MEDICINE

## 2022-11-29 PROCEDURE — 99999 PR PBB SHADOW E&M-EST. PATIENT-LVL III: CPT | Mod: PBBFAC,,, | Performed by: INTERNAL MEDICINE

## 2022-11-29 NOTE — PROGRESS NOTES
PATIENT: Francis Nagel  MRN: [id number]  DATE: 11/29/2022      Diagnosis:   1. Malignant tumor of unknown origin    2. Pulmonary nodule          Chief Complaint: Follow-up (Tumor of Unknown Origin)      Oncologic History:      Oncologic History 2/04/22 US Pelvis  2/15/22 CT Abdomen and Pelvis  3/10/22 robotic salpingo oophorectomy of the left ovary  4/11/22 EGD Colonoscopy  4/21/22 PET/CT  5/13/22 robotic assisted total laparoscopic hysterectomy, right salpingo oophorectomy, bilateral pelvic and periaortic lymph node dissection, omentectomy, peritoneal biopsies, and appendectomy  6/29/22 - PET/CT  8/03/22 - EUS  11/21/22 CT C/A/P    Oncologic Treatment 3/10/22 - robotic salpingo oophorectomy of the left ovary    Pathology 3/10/22 adenocarcinoma intestinal phenotype CK7+, 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  5/13/22  no sign of malignancy and 16 negative lymph nodes      The patient initially presented to see Dr Johnson on 2/03/22 with complaint of pelvic pain.  US of the pelvis on 2/04/22 showed a 15.7 x 7.9 x 11.1 complex multi-septated cystic right ovarian mass replacing the entire right ovary.  CT of the abdomen and pelvis on 02/15/2022 showed a large complex pelvic mass centered above an left lateral to the uterus measuring 16.7 cm in greatest dimension.  The patient then saw gynecologist oncologist Dr. Cintron on 02/16/2022.  She then saw Dr. Delacruz on 02/21/2022.  The patient then underwent robotic salpingo oophorectomy of the left ovary under the care of Dr. 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  The patient then underwent EGD and colonoscopy on 04/11/2022 showing gastritis, antritis, and internal hemorrhoids.  PET-CT performed 04/21/2022 showed mildly increased uptake in the thyroid g gland bilaterally in keeping with thyroiditis; multifocal activity in the right adnexal region with maximum SUV of 6.6 atypical for physiologic ovarian uptake in a  premenopausal woman; punctate area of low-grade FDG activity localized to the left subserosal aspect of the uterus; diffusely increased uptake in the gastric cardia and body without discrete nodularity or mass.  The patient then underwent robotic assisted total laparoscopic hysterectomy, right salpingo oophorectomy, bilateral pelvic and periaortic lymph node dissection, omentectomy, peritoneal biopsies, and appendectomy under the care of Dr. Delacruz on 05/13/2022 with pathology showing no sign of malignancy and 16 negative lymph nodes.  The patient was then admitted from [address] [place] [address] for postoperative pelvic abscess with placement of peritoneal drain.  Cultures grew out Klebsiella with sensitivities to Bactrim.  Patient was discussed at tumor Board on 06/08/2022 with recommendations to check CEA and CA 19 9, obtained MRI abdomen and pelvis with MRCP and hold adjuvant chemotherapy pending further workup.  The patient underwent MRCP on 06/22/2022 showing 11 mm subcapsular mass in segment 3 of the liver concerning for benign cavernous hemangioma; similar lesion in segment 4A measuring 6 mm; stable with respect of prior scan on 02/15/2022.  MRCP sequence a demonstrated normal hepatic biliary structures with normal pancreatic duct.   The patient underwent PET-CT on 06/29/2022 showing 17 x 14 mm fluid density along the right pelvic sidewall likely residual fluid from postop abscess consistent with small seroma or lymphocele and no evidence of recurrence or metastatic disease.   The patient underwent EUS with Dr Echols on 8/03/22 showing a lesion in the left lobe of the liver measuring [id number] and normal appearance of the pancreas, gallbladder, esophagus, stomach, duodenum and vessels.   The patient underwent initiation into the STRATA SENTINEL trial.  Tumor assessed on 8/26/22 showed a TP53 mutation as well as variance of unknown significance with GRM8, MAGEA1, NLRP1, PLAG1, TET1.  Blood collected 8/25/22 showed no tumor  molecules detected.    Subjective:    Interval History:  Ms. Nagel is a 32 y.o. female with anemia who presents for work up of metastatic cancer.  Since the last clinic visit the patient underwent CT of the chest, abdomen, and pelvis on 11/21/2022 showing a 2 mm nodule in the apical segment of the right upper lobe; small arterially enhancing lesions within the left hepatic lobe previously characterized as hemangiomas; hypodense lesion in the left hepatic lobe characterized as hemangioma; and stable appearing nonspecific 0.9 cm rounded sclerotic focus in the left iliac bone.  Blood collected on 11/03/2022 for the sentinel trial showed no circulating tumor DNA.      The patient denies CP, cough, SOB, abdominal pain, N/V, constipation, diarrhea.  The patient denies fever, chills, night sweats, weight loss, new lumps or bumps, easy bruising or bleeding.    Past Medical History:   Past Medical History:   Diagnosis Date    Anemia 03/11/2022    Encounter for blood transfusion 03/11/2022    Mass of left ovary 03/10/2022    JOSE MIGUEL.     LEFT OVARY SHOWS ADENOCARCINOMA, INTESTINAL PHENOTYPE MEASURING 15 CM    Metastatic cancer 4/11/2022    PONV (postoperative nausea and vomiting)     S/P unilateral salpingo-oophorectomy/mass resection 03/10/2022       Past Surgical HIstory:   Past Surgical History:   Procedure Laterality Date    COLONOSCOPY N/A 4/11/2022    Procedure: COLONOSCOPY;  Surgeon: Eric Meléndez Jr., MD;  Location: [facility];  Service: Endoscopy;  Laterality: N/A;    ENDOSCOPIC ULTRASOUND OF UPPER GASTROINTESTINAL TRACT Left 8/3/2022    Procedure: ULTRASOUND, UPPER GI TRACT, ENDOSCOPIC;  Surgeon: Richar Echols MD;  Location: [facility];  Service: Endoscopy;  Laterality: Left;    ESOPHAGOGASTRODUODENOSCOPY N/A 4/11/2022    Procedure: EGD (ESOPHAGOGASTRODUODENOSCOPY);  Surgeon: Eric Meléndez Jr., MD;  Location: [facility];  Service: Endoscopy;  Laterality: N/A;    ESOPHAGOGASTRODUODENOSCOPY N/A 8/3/2022     Procedure: EGD (ESOPHAGOGASTRODUODENOSCOPY);  Surgeon: Richar Echols MD;  Location: [facility];  Service: Endoscopy;  Laterality: N/A;    ROBOT-ASSISTED APPENDECTOMY  5/13/2022    Procedure: ROBOTIC APPENDECTOMY;  Surgeon: Charmaine Delacruz MD;  Location: [facility];  Service: OB/GYN;;    ROBOT-ASSISTED LAPAROSCOPIC ABDOMINAL HYSTERECTOMY USING DA DARVIN XI N/A 5/13/2022    Procedure: XI ROBOTIC HYSTERECTOMY;  Surgeon: Charmaine Delacruz MD;  Location: [facility];  Service: OB/GYN;  Laterality: N/A;    ROBOT-ASSISTED LAPAROSCOPIC OMENTECTOMY USING DA DARVIN XI N/A 5/13/2022    Procedure: XI ROBOTIC OMENTECTOMY;  Surgeon: Charmaine Delacruz MD;  Location: [facility];  Service: OB/GYN;  Laterality: N/A;    ROBOT-ASSISTED LAPAROSCOPIC SALPINGO-OOPHORECTOMY USING DA DARVIN XI Left 3/10/2022    Procedure: XI ROBOTIC SALPINGO-OOPHORECTOMY/ USO;  Surgeon: Charmaine Delacruz MD;  Location: [address];  Service: OB/GYN;  Laterality: Left;    ROBOT-ASSISTED LAPAROSCOPIC SURGICAL REMOVAL OF OVARY USING DA DARVIN XI Right 5/13/2022    Procedure: XI ROBOTIC OOPHORECTOMY;  Surgeon: Charmaine Delacruz MD;  Location: [facility];  Service: OB/GYN;  Laterality: Right;    ROBOT-ASSISTED SURGICAL REMOVAL OF FALLOPIAN TUBE USING DA DARVIN XI Bilateral 5/13/2022    Procedure: XI ROBOTIC SALPINGECTOMY;  Surgeon: Charmaine Delacruz MD;  Location: [facility];  Service: OB/GYN;  Laterality: Bilateral;       Family History:   Family History   Problem Relation Age of Onset    Breast cancer Maternal Aunt     Lung cancer Paternal Uncle     Colon cancer Neg Hx     Ovarian cancer Neg Hx        Social History:  reports that she has never smoked. She has never used smokeless tobacco. She reports current alcohol use. She reports that she does not use drugs.    Allergies:  Review of patient's allergies indicates:  No Known Allergies    Medications:  Current Outpatient Medications   Medication Sig Dispense Refill    cetirizine (ZYRTEC) 10 MG tablet Take by mouth once  "daily.      ketotifen (ZADITOR) 0.025 % (0.035 %) ophthalmic solution Place 1 drop into both eyes 3 (three) times daily.      olopatadine (PATADAY) 0.2 % Drop Place 1 drop into both eyes once daily. 2.5 mL 5     No current facility-administered medications for this visit.       Review of Systems   Constitutional:  Negative for appetite change, chills, diaphoresis, fatigue, fever and unexpected weight change.   HENT:  Negative for mouth sores.    Eyes:  Negative for visual disturbance.   Respiratory:  Negative for cough and shortness of breath.    Cardiovascular:  Negative for chest pain and palpitations.   Gastrointestinal:  Negative for abdominal pain, constipation, diarrhea, nausea and vomiting.   Genitourinary:  Negative for frequency and pelvic pain.   Musculoskeletal:  Negative for back pain.   Skin:  Negative for rash.   Neurological:  Negative for headaches.   Hematological:  Negative for adenopathy. Does not bruise/bleed easily.   Psychiatric/Behavioral:  The patient is not nervous/anxious.      ECOG Performance Status: 0   Objective:      Vitals:   Vitals:    11/29/22 1309   BP: 98/70   BP Location: Right arm   Patient Position: Sitting   BP Method: Medium (Manual)   Pulse: 89   Temp: 97.6 °F (36.4 °C)   TempSrc: Temporal   SpO2: 99%   Weight: 50.5 kg (111 lb 5.3 oz)   Height: 5' 1" (1.549 m)       Physical Exam  Constitutional:       General: She is not in acute distress.     Appearance: She is well-developed. She is not diaphoretic.   HENT:      Head: Normocephalic and atraumatic.   Cardiovascular:      Rate and Rhythm: Normal rate and regular rhythm.      Heart sounds: Normal heart sounds. No murmur heard.    No friction rub. No gallop.   Pulmonary:      Effort: Pulmonary effort is normal. No respiratory distress.      Breath sounds: Normal breath sounds. No wheezing or rales.   Chest:      Chest wall: No tenderness.   Abdominal:      General: Bowel sounds are normal. There is no distension.      " Palpations: Abdomen is soft. There is no mass.      Tenderness: There is no abdominal tenderness. There is no guarding or rebound.   Lymphadenopathy:      Cervical: No cervical adenopathy.      Upper Body:      Right upper body: No supraclavicular or axillary adenopathy.      Left upper body: No supraclavicular or axillary adenopathy.   Skin:     Findings: No erythema or rash.   Neurological:      Mental Status: She is alert and oriented to person, place, and time.   Psychiatric:         Behavior: Behavior normal.       Laboratory Data:  No visits with results within 1 Week(s) from this visit.   Latest known visit with results is:   Lab Visit on 11/21/2022   Component Date Value Ref Range Status    WBC 11/21/2022 4.18  3.90 - 12.70 K/uL Final    RBC 11/21/2022 4.14  4.00 - 5.40 M/uL Final    Hemoglobin 11/21/2022 12.8  12.0 - 16.0 g/dL Final    Hematocrit 11/21/2022 38.2  37.0 - 48.5 % Final    MCV 11/21/2022 92  82 - 98 fL Final    MCH 11/21/2022 30.9  27.0 - 31.0 pg Final    MCHC 11/21/2022 33.5  32.0 - 36.0 g/dL Final    RDW 11/21/2022 11.5  11.5 - 14.5 % Final    Platelets 11/21/2022 222  150 - 450 K/uL Final    MPV 11/21/2022 8.9 (L)  9.2 - 12.9 fL Final    Immature Granulocytes 11/21/2022 0.0  0.0 - 0.5 % Final    Gran # (ANC) 11/21/2022 2.0  1.8 - 7.7 K/uL Final    Immature Grans (Abs) 11/21/2022 0.00  0.00 - 0.04 K/uL Final    Comment: Mild elevation in immature granulocytes is non specific and   can be seen in a variety of conditions including stress response,   acute inflammation, trauma and pregnancy. Correlation with other   laboratory and clinical findings is essential.      Lymph # 11/21/2022 1.7  1.0 - 4.8 K/uL Final    Mono # 11/21/2022 0.3  0.3 - 1.0 K/uL Final    Eos # 11/21/2022 0.1  0.0 - 0.5 K/uL Final    Baso # 11/21/2022 0.01  0.00 - 0.20 K/uL Final    nRBC 11/21/2022 0  0 /100 WBC Final    Gran % 11/21/2022 47.4  38.0 - 73.0 % Final    Lymph % 11/21/2022 41.4  18.0 - 48.0 % Final    Mono %  2022 8.1  4.0 - 15.0 % Final    Eosinophil % 2022 2.9  0.0 - 8.0 % Final    Basophil % 2022 0.2  0.0 - 1.9 % Final    Differential Method 2022 Automated   Final    Sodium 2022 141  136 - 145 mmol/L Final    Potassium 2022 4.1  3.5 - 5.1 mmol/L Final    Chloride 2022 104  95 - 110 mmol/L Final    CO2 2022 28  23 - 29 mmol/L Final    Glucose 2022 85  70 - 110 mg/dL Final    BUN 2022 7  6 - 20 mg/dL Final    Creatinine 2022 0.6  0.5 - 1.4 mg/dL Final    Calcium 2022 9.8  8.7 - 10.5 mg/dL Final    Total Protein 2022 8.1  6.0 - 8.4 g/dL Final    Albumin 2022 4.4  3.5 - 5.2 g/dL Final    Total Bilirubin 2022 0.7  0.1 - 1.0 mg/dL Final    Comment: For infants and newborns, interpretation of results should be based  on gestational age, weight and in agreement with clinical  observations.    Premature Infant recommended reference ranges:  Up to 24 hours.............<8.0 mg/dL  Up to 48 hours............<12.0 mg/dL  3-5 days..................<15.0 mg/dL  6-29 days.................<15.0 mg/dL      Alkaline Phosphatase 2022 53 (L)  55 - 135 U/L Final    AST 2022 12  10 - 40 U/L Final    ALT 2022 18  10 - 44 U/L Final    Anion Gap 2022 9  8 - 16 mmol/L Final    eGFR 2022 >60  >60 mL/min/1.73 m^2 Final         Imagin/21/22 CT C/A/P  Base of neck/thoracic soft tissues: Thyroid gland is mildly enlarged.  No discrete thyroid nodule.     Thoracic aorta: Left-sided aortic arch. No significant atherosclerosis or aneurysm.     Heart: Normal in size.  No pericardial effusion.     Cornelia/Mediastinum: No pathologically enlarged lymph nodes.     Lungs/Airways: Airways are patent.  Lungs are well aerated, without consolidation or pleural fluid. There is a 2 mm nodule within the apical segment of the right upper lobe (series 6, image 85).  No other suspicious pulmonary nodule or mass identified.     Esophagus: No  significant abnormality.     Liver: Normal in size and attenuation.  Small arterially enhancing lesions within the left hepatic lobe, measuring 1.4 cm and 0.7 cm in size (series 3, image 27 and 15, respectively), previously characterized as hemangiomas.  There is a 3rd hypodense lesion with peripheral enhancement measuring 0.7 cm in the left hepatic lobe (series 3, image 19), too small to definitively characterize and may also represent additional hemangioma, not significantly changed since 02/15/2022.  No new suspicious hepatic lesion.     Gallbladder: No calcified gallstones or evidence of pericholecystic inflammation.     Bile Ducts: No evidence of dilated ducts.     Pancreas: No definite mass or peripancreatic fat stranding.     Spleen: Within normal limits.     Adrenals: No significant abnormality.     Kidneys/ Ureters: Multiple right-sided in single left renal cortical hypodensities, some which are too small to characterize, most compatible with.  The largest measures 1.6 cm in the upper pole of the right kidney.  Bilateral extrarenal pelves with mild, right greater than left, hydronephrosis.  No ureterolithiasis.     Bladder: No evidence of wall thickening.     Reproductive organs: Postoperative changes of hysterectomy and bilateral salpingo-oophorectomy.  No evidence of residual recurrent soft tissue mass.     GI tract/Mesentery:   No evidence of bowel obstruction or inflammation. Appendix has been reportedly removed.     Peritoneal Space: No ascites. No free air.     Lymph nodes: No pathologically enlarged lymph nodes.     Soft tissues: No significant abnormality.     Vasculature: No significant atherosclerosis. No aortic aneurysm.     Bones:  Mild age-appropriate degenerative change.  Stable-appearing nonspecific 0.9 cm rounded sclerotic focus in the left iliac bone, unchanged since 02/15/2022.       Assessment:       1. Malignant tumor of unknown origin    2. 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  -Molecular markers were suggestive of intestinal origin  -Patient underwent colonoscopy and EGD without pathology found  -In addition patient underwent complete hysterectomy with right salpingo oophorectomy and extensive abdominal surgery without additional disease found  -Repeat PET/CT 7/05/22 showed no residual disease  -EUS on 8/03/22 showed no disease in the esophagus, stomach, pancreas, liver or vessels  -Liver lesion seen on EUS was discussed with Dr Echols today whom stated the [id number] lesion was a hemangioma  -Pt with presumed malignancy only in the ovary and would be classified as stage 1  -Treatment with FOLFOX discussed given suggestion of GI primary; however, the patient declined chemotherapy  -The patient understood that there is a higher risk of recurrence without adjuvant treatment  -The patient was enrolled in the STRATA [place] Trial   -Analysis done on 8/26/22 of the tumor removed on 3/10/22 showed a TP53 mutation as well as variance of unknown significance with GRM8, MAGEA1, NLRP1, PLAG1, TET1  -Blood collected 8/25/22 and 11/03/22 showed no tumor molecules detected.  -Pt to undergo repeat blood test for SENTINEL trial for circulating DNA 2/01/23  -PT to undergo repeat staging scans in 3 months with return visit    Pulmonary Nodule - pt with 2mm nodule in the right apical segment of the RUL  -Will monitor on CT scans in 3 months    Route Chart for Scheduling    Med Onc Chart Routing      Follow up with physician 3 months. Pt needs a CBC, CMP, CT C/A/P and [place] trial blood test in 3 months with a return appt once the CT Scans are done.   Follow up with YADIRA    Infusion scheduling note    Injection scheduling note    Labs    Imaging    Pharmacy appointment    Other referrals             Jameson Hou MD  [facility]  Hematology and Oncology  [facility]   [address]    [place] [address]   O: [phone]  F: [phone]

## 2022-11-29 NOTE — PROGRESS NOTES
Patient Name: Francis Nagel  [id number] (SENTINEL)  IRB# [id number]  Treating MD: Dr. Jameson Hou  Patient ID # [id number]    Date: November 29th, 2022    Version 6/1/2021 Topmost Re-Consent    The patient, accompanied by her mother in law, arrived at the [facility]'s lab today for a follow up appointment with her treating oncologist, Dr. Hou.The patient was alert and oriented without noted distress with appropriate mood and affect.  At today's visit, the patient was presented with the current, IRB approved, 6/1/21 Topmost consent form. The patient was notified that at her previous informed consent meeting, she was presented with and signed the wrong version of the Topmost trial's consent (Version 6/12/22).     The patient was also given a copy of the IRB approved letter documenting the differences between the 6/12/22 consent and the 6/1/21 consent that she is being asked to sign today.     The following differences in the 6/12/22 consent that are not in the 6/1/21 consent were reviewed in detail with the patient:     Clarification that blood draws must occur >/= 3 weeks after surgery, if applicable  Updated follow-up period to be survival follow-up only  Clarification that the physician may communicate the results to patients as they deem medically necessary  Updated length of participation to 8 years   Updated total amount of subjects enrolled to 100,000  Blood draws will be conducted for up to 5 years until your doctor determines your cancer has come back, whichever comes first    After reviewing the differences between the two consent forms, the patient and her mother in law were given the opportunity to ask any questions. The patient asked whether her participation would still be voluntary and if she would still be allowed to withdraw. This CRC explained to the patient that her participation would continue to be voluntary if she signed the 6/1/21 consent and she would continue to have  the opportunity to withdraw her participation at anytime. The patient and her mother in law then confirmed that all of their questions had been answered to their satisfaction and denied have any additional questions or concerns at present. The patient was then queried on whether she would like to sign the 6/1/21 consent form today or if she would like to take it home to review with family and return to sign later. The patient stated that she did not have any additional questions or concerns and wanted to sign the 6/1/21 informed consent. The patient signed the 6/1/21 consent and a copy of the signed informed consent was provided to the patient. The patient was provided with additional business cards for this CRC and [facility], and was encouraged to call with any questions or concerns regarding the re-consent.      The patient's treating physician, , was notified of the patient's re-consent as well as the differences between the 6/12/22 and 6/1/21 consents.     The following was completed after consent was signed:  A copy of the signed 6/1/21 consent was provided to the patient  A copy of the IRB approved letter was provided to the patient    Next study appt:    3 month MRD blood collection due January 26th, 2023 (+/- 15 days)    -Appt made for 2/1/23 (within 15 days)    Lambert Gracia

## 2023-01-25 ENCOUNTER — TELEPHONE (OUTPATIENT)
Dept: RESEARCH | Facility: HOSPITAL | Age: 33
End: 2023-01-25
Payer: COMMERCIAL

## 2023-01-25 NOTE — TELEPHONE ENCOUNTER
1/25/23  12:10    Called the patient to confirm her upcoming lab appointment on 2/1/23 to have [place] MRD follow up labs drawn. The patient did not answer the phone. A voicemail and call back number were left.    Crys Ross CRC

## 2023-01-27 ENCOUNTER — PATIENT MESSAGE (OUTPATIENT)
Dept: RESEARCH | Facility: HOSPITAL | Age: 33
End: 2023-01-27
Payer: COMMERCIAL

## 2023-02-03 ENCOUNTER — PATIENT MESSAGE (OUTPATIENT)
Dept: HEMATOLOGY/ONCOLOGY | Facility: CLINIC | Age: 33
End: 2023-02-03
Payer: COMMERCIAL

## 2023-02-06 ENCOUNTER — RESEARCH ENCOUNTER (OUTPATIENT)
Dept: RESEARCH | Facility: HOSPITAL | Age: 33
End: 2023-02-06
Payer: COMMERCIAL

## 2023-02-06 ENCOUNTER — PATIENT MESSAGE (OUTPATIENT)
Dept: RESEARCH | Facility: HOSPITAL | Age: 33
End: 2023-02-06
Payer: COMMERCIAL

## 2023-02-06 NOTE — PROGRESS NOTES
Name: Francis Nagel   [id number] (SENTINEL)  IRB# [id number]  PID:[id number]  DATE: February 6th, 2023    [place] Visit 2 (12-week MRD monitoring):  The patient presented at the [facility] for her visit 2, MRD monitoring blood draw.   This CRC met the patient in the phlebotomy lab of the [facility]. Required blood specimens were collected by phlebotomist using two patient identifiers with CRC present. This CRC informed the patient that Dr. Hou would report her MRD results at the next visit.   Blood specimens shipped via [employer].    Adverse Events: No adverse events or serious adverse events related to the blood draw were noted    Next 12-week MRD Blood Collection (Visit 3) due May 1st, 2023 (+/- 15 days)    DOMENIC Gracia

## 2023-03-13 NOTE — PROGRESS NOTES
PATIENT: Francis Nagel  MRN: [id number]  DATE: 3/14/2023      Diagnosis:   1. Malignant tumor of unknown origin    2. Pulmonary nodule    3. Hot flashes            Chief Complaint: Follow-up (Malignant tumor of unknown origin)        Oncologic History:      Oncologic History 2/04/22 US Pelvis  2/15/22 CT Abdomen and Pelvis  3/10/22 robotic salpingo oophorectomy of the left ovary  4/11/22 EGD Colonoscopy  4/21/22 PET/CT  5/13/22 robotic assisted total laparoscopic hysterectomy, right salpingo oophorectomy, bilateral pelvic and periaortic lymph node dissection, omentectomy, peritoneal biopsies, and appendectomy  6/29/22 - PET/CT  8/03/22 - EUS  11/21/22 CT C/A/P    Oncologic Treatment 3/10/22 - robotic salpingo oophorectomy of the left ovary    Pathology 3/10/22 adenocarcinoma intestinal phenotype CK7+, 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  5/13/22  no sign of malignancy and 16 negative lymph nodes      The patient initially presented to see Dr Johnson on 2/03/22 with complaint of pelvic pain.  US of the pelvis on 2/04/22 showed a 15.7 x 7.9 x 11.1 complex multi-septated cystic right ovarian mass replacing the entire right ovary.  CT of the abdomen and pelvis on 02/15/2022 showed a large complex pelvic mass centered above an left lateral to the uterus measuring 16.7 cm in greatest dimension.  The patient then saw gynecologist oncologist Dr. Cintron on 02/16/2022.  She then saw Dr. Delacruz on 02/21/2022.  The patient then underwent robotic salpingo oophorectomy of the left ovary under the care of Dr. 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  The patient then underwent EGD and colonoscopy on 04/11/2022 showing gastritis, antritis, and internal hemorrhoids.  PET-CT performed 04/21/2022 showed mildly increased uptake in the thyroid g gland bilaterally in keeping with thyroiditis; multifocal activity in the right adnexal region with maximum SUV of 6.6 atypical for  physiologic ovarian uptake in a premenopausal woman; punctate area of low-grade FDG activity localized to the left subserosal aspect of the uterus; diffusely increased uptake in the gastric cardia and body without discrete nodularity or mass.  The patient then underwent robotic assisted total laparoscopic hysterectomy, right salpingo oophorectomy, bilateral pelvic and periaortic lymph node dissection, omentectomy, peritoneal biopsies, and appendectomy under the care of Dr. Delacruz on 05/13/2022 with pathology showing no sign of malignancy and 16 negative lymph nodes.  The patient was then admitted from [address] [place] [address] for postoperative pelvic abscess with placement of peritoneal drain.  Cultures grew out Klebsiella with sensitivities to Bactrim.  Patient was discussed at tumor Board on 06/08/2022 with recommendations to check CEA and CA 19 9, obtained MRI abdomen and pelvis with MRCP and hold adjuvant chemotherapy pending further workup.  The patient underwent MRCP on 06/22/2022 showing 11 mm subcapsular mass in segment 3 of the liver concerning for benign cavernous hemangioma; similar lesion in segment 4A measuring 6 mm; stable with respect of prior scan on 02/15/2022.  MRCP sequence a demonstrated normal hepatic biliary structures with normal pancreatic duct.   The patient underwent PET-CT on 06/29/2022 showing 17 x 14 mm fluid density along the right pelvic sidewall likely residual fluid from postop abscess consistent with small seroma or lymphocele and no evidence of recurrence or metastatic disease.   The patient underwent EUS with Dr Echols on 8/03/22 showing a lesion in the left lobe of the liver measuring 27l19te and normal appearance of the pancreas, gallbladder, esophagus, stomach, duodenum and vessels.   The patient underwent initiation into the STRATA SENTINEL trial.  Tumor assessed on 8/26/22 showed a TP53 mutation as well as variance of unknown significance with GRM8, MAGEA1, NLRP1, PLAG1, TET1.  Blood  collected 8/25/22 showed no tumor molecules detected.   The patient underwent CT of the chest, abdomen, and pelvis on 11/21/2022 showing a 2 mm nodule in the apical segment of the right upper lobe; small arterially enhancing lesions within the left hepatic lobe previously characterized as hemangiomas; hypodense lesion in the left hepatic lobe characterized as hemangioma; and stable appearing nonspecific 0.9 cm rounded sclerotic focus in the left iliac bone.  Blood collected on 11/03/2022 for the sentinel trial showed no circulating tumor DNA.  Subjective:    Interval History:  Ms. Nagel is a 32 y.o. female with anemia who presents for work up of metastatic cancer.  Since the last clinic visit the patient had circulating tumor DNA checked via the sentinel trial on 02/06/2023 showing no evidence of cancer.  Currently the patient endorses fatigue, occasional headaches, hot flashes, and occasional bloating.  The patient denies CP, cough, SOB, abdominal pain, N/V, constipation, diarrhea.  The patient denies fever, chills, night sweats, weight loss, new lumps or bumps, easy bruising or bleeding.    Past Medical History:   Past Medical History:   Diagnosis Date    Anemia 03/11/2022    Encounter for blood transfusion 03/11/2022    Mass of left ovary 03/10/2022    JOSE MIGUEL.     LEFT OVARY SHOWS ADENOCARCINOMA, INTESTINAL PHENOTYPE MEASURING 15 CM    Metastatic cancer 4/11/2022    PONV (postoperative nausea and vomiting)     S/P unilateral salpingo-oophorectomy/mass resection 03/10/2022       Past Surgical HIstory:   Past Surgical History:   Procedure Laterality Date    COLONOSCOPY N/A 4/11/2022    Procedure: COLONOSCOPY;  Surgeon: Eric Meléndez Jr., MD;  Location: [facility];  Service: Endoscopy;  Laterality: N/A;    ENDOSCOPIC ULTRASOUND OF UPPER GASTROINTESTINAL TRACT Left 8/3/2022    Procedure: ULTRASOUND, UPPER GI TRACT, ENDOSCOPIC;  Surgeon: Richar Echols MD;  Location: [facility];  Service: Endoscopy;   Laterality: Left;    ESOPHAGOGASTRODUODENOSCOPY N/A 4/11/2022    Procedure: EGD (ESOPHAGOGASTRODUODENOSCOPY);  Surgeon: Eric Meléndez Jr., MD;  Location: [facility];  Service: Endoscopy;  Laterality: N/A;    ESOPHAGOGASTRODUODENOSCOPY N/A 8/3/2022    Procedure: EGD (ESOPHAGOGASTRODUODENOSCOPY);  Surgeon: Richar Echols MD;  Location: [facility];  Service: Endoscopy;  Laterality: N/A;    ROBOT-ASSISTED APPENDECTOMY  5/13/2022    Procedure: ROBOTIC APPENDECTOMY;  Surgeon: Charmaine Delacruz MD;  [facility];  Service: OB/GYN;;    ROBOT-ASSISTED LAPAROSCOPIC ABDOMINAL HYSTERECTOMY USING DA DARVIN XI N/A 5/13/2022    Procedure: XI ROBOTIC HYSTERECTOMY;  Surgeon: Charmaine Delacruz MD;  Location: [facility];  Service: OB/GYN;  Laterality: N/A;    ROBOT-ASSISTED LAPAROSCOPIC OMENTECTOMY USING DA DARVIN XI N/A 5/13/2022    Procedure: XI ROBOTIC OMENTECTOMY;  Surgeon: Charmaine Delacruz MD;  Location: [facility];  Service: OB/GYN;  Laterality: N/A;    ROBOT-ASSISTED LAPAROSCOPIC SALPINGO-OOPHORECTOMY USING DA DARVIN XI Left 3/10/2022    Procedure: XI ROBOTIC SALPINGO-OOPHORECTOMY/ USO;  Surgeon: Charmaine Delacruz MD;  Location: [address];  Service: OB/GYN;  Laterality: Left;    ROBOT-ASSISTED LAPAROSCOPIC SURGICAL REMOVAL OF OVARY USING DA DARVIN XI Right 5/13/2022    Procedure: XI ROBOTIC OOPHORECTOMY;  Surgeon: Charmaine Delacruz MD;  Location: [facility];  Service: OB/GYN;  Laterality: Right;    ROBOT-ASSISTED SURGICAL REMOVAL OF FALLOPIAN TUBE USING DA DARVIN XI Bilateral 5/13/2022    Procedure: XI ROBOTIC SALPINGECTOMY;  Surgeon: Charmaine Delacruz MD;  Location: [facility];  Service: OB/GYN;  Laterality: Bilateral;       Family History:   Family History   Problem Relation Age of Onset    Breast cancer Maternal Aunt     Lung cancer Paternal Uncle     Colon cancer Neg Hx     Ovarian cancer Neg Hx        Social History:  reports that she has never smoked. She has never used smokeless tobacco. She reports current alcohol use. She  "reports that she does not use drugs.    Allergies:  Review of patient's allergies indicates:  No Known Allergies    Medications:  Current Outpatient Medications   Medication Sig Dispense Refill    cetirizine (ZYRTEC) 10 MG tablet Take by mouth once daily.       No current facility-administered medications for this visit.       Review of Systems   Constitutional:  Positive for diaphoresis and fatigue. Negative for appetite change, chills, fever and unexpected weight change.   HENT:  Negative for mouth sores.    Eyes:  Negative for visual disturbance.   Respiratory:  Negative for cough and shortness of breath.    Cardiovascular:  Negative for chest pain and palpitations.   Gastrointestinal:  Negative for abdominal pain, constipation, diarrhea, nausea and vomiting.        Bloating   Genitourinary:  Negative for frequency and pelvic pain.   Musculoskeletal:  Negative for back pain.   Skin:  Negative for rash.   Neurological:  Positive for headaches.   Hematological:  Negative for adenopathy. Does not bruise/bleed easily.   Psychiatric/Behavioral:  The patient is not nervous/anxious.      ECOG Performance Status: 0   Objective:      Vitals:   Vitals:    03/14/23 1541   BP: 106/70   BP Location: Right arm   Patient Position: Sitting   BP Method: Medium (Manual)   Pulse: 89   Temp: 97.5 °F (36.4 °C)   TempSrc: Temporal   SpO2: 95%   Weight: 55 kg (121 lb 4.1 oz)   Height: 5' 1" (1.549 m)         Physical Exam  Constitutional:       General: She is not in acute distress.     Appearance: She is well-developed. She is not diaphoretic.   HENT:      Head: Normocephalic and atraumatic.   Cardiovascular:      Rate and Rhythm: Normal rate and regular rhythm.      Heart sounds: Normal heart sounds. No murmur heard.    No friction rub. No gallop.   Pulmonary:      Effort: Pulmonary effort is normal. No respiratory distress.      Breath sounds: Normal breath sounds. No wheezing or rales.   Chest:      Chest wall: No tenderness. "   Abdominal:      General: Bowel sounds are normal. There is no distension.      Palpations: Abdomen is soft. There is no mass.      Tenderness: There is no abdominal tenderness. There is no guarding or rebound.   Lymphadenopathy:      Cervical: No cervical adenopathy.      Upper Body:      Right upper body: No supraclavicular or axillary adenopathy.      Left upper body: No supraclavicular or axillary adenopathy.   Skin:     Findings: No erythema or rash.   Neurological:      Mental Status: She is alert and oriented to person, place, and time.   Psychiatric:         Behavior: Behavior normal.       Laboratory Data:  No visits with results within 1 Week(s) from this visit.   Latest known visit with results is:   Lab Visit on 11/21/2022   Component Date Value Ref Range Status    WBC 11/21/2022 4.18  3.90 - 12.70 K/uL Final    RBC 11/21/2022 4.14  4.00 - 5.40 M/uL Final    Hemoglobin 11/21/2022 12.8  12.0 - 16.0 g/dL Final    Hematocrit 11/21/2022 38.2  37.0 - 48.5 % Final    MCV 11/21/2022 92  82 - 98 fL Final    MCH 11/21/2022 30.9  27.0 - 31.0 pg Final    MCHC 11/21/2022 33.5  32.0 - 36.0 g/dL Final    RDW 11/21/2022 11.5  11.5 - 14.5 % Final    Platelets 11/21/2022 222  150 - 450 K/uL Final    MPV 11/21/2022 8.9 (L)  9.2 - 12.9 fL Final    Immature Granulocytes 11/21/2022 0.0  0.0 - 0.5 % Final    Gran # (ANC) 11/21/2022 2.0  1.8 - 7.7 K/uL Final    Immature Grans (Abs) 11/21/2022 0.00  0.00 - 0.04 K/uL Final    Comment: Mild elevation in immature granulocytes is non specific and   can be seen in a variety of conditions including stress response,   acute inflammation, trauma and pregnancy. Correlation with other   laboratory and clinical findings is essential.      Lymph # 11/21/2022 1.7  1.0 - 4.8 K/uL Final    Mono # 11/21/2022 0.3  0.3 - 1.0 K/uL Final    Eos # 11/21/2022 0.1  0.0 - 0.5 K/uL Final    Baso # 11/21/2022 0.01  0.00 - 0.20 K/uL Final    nRBC 11/21/2022 0  0 /100 WBC Final    Gran % 11/21/2022 47.4   38.0 - 73.0 % Final    Lymph % 2022 41.4  18.0 - 48.0 % Final    Mono % 2022 8.1  4.0 - 15.0 % Final    Eosinophil % 2022 2.9  0.0 - 8.0 % Final    Basophil % 2022 0.2  0.0 - 1.9 % Final    Differential Method 2022 Automated   Final    Sodium 2022 141  136 - 145 mmol/L Final    Potassium 2022 4.1  3.5 - 5.1 mmol/L Final    Chloride 2022 104  95 - 110 mmol/L Final    CO2 2022 28  23 - 29 mmol/L Final    Glucose 2022 85  70 - 110 mg/dL Final    BUN 2022 7  6 - 20 mg/dL Final    Creatinine 2022 0.6  0.5 - 1.4 mg/dL Final    Calcium 2022 9.8  8.7 - 10.5 mg/dL Final    Total Protein 2022 8.1  6.0 - 8.4 g/dL Final    Albumin 2022 4.4  3.5 - 5.2 g/dL Final    Total Bilirubin 2022 0.7  0.1 - 1.0 mg/dL Final    Comment: For infants and newborns, interpretation of results should be based  on gestational age, weight and in agreement with clinical  observations.    Premature Infant recommended reference ranges:  Up to 24 hours.............<8.0 mg/dL  Up to 48 hours............<12.0 mg/dL  3-5 days..................<15.0 mg/dL  6-29 days.................<15.0 mg/dL      Alkaline Phosphatase 2022 53 (L)  55 - 135 U/L Final    AST 2022 12  10 - 40 U/L Final    ALT 2022 18  10 - 44 U/L Final    Anion Gap 2022 9  8 - 16 mmol/L Final    eGFR 2022 >60  >60 mL/min/1.73 m^2 Final         Imagin/21/22 CT C/A/P  Base of neck/thoracic soft tissues: Thyroid gland is mildly enlarged.  No discrete thyroid nodule.     Thoracic aorta: Left-sided aortic arch. No significant atherosclerosis or aneurysm.     Heart: Normal in size.  No pericardial effusion.     Cornelia/Mediastinum: No pathologically enlarged lymph nodes.     Lungs/Airways: Airways are patent.  Lungs are well aerated, without consolidation or pleural fluid. There is a 2 mm nodule within the apical segment of the right upper lobe (series 6, image 85).   No other suspicious pulmonary nodule or mass identified.     Esophagus: No significant abnormality.     Liver: Normal in size and attenuation.  Small arterially enhancing lesions within the left hepatic lobe, measuring 1.4 cm and 0.7 cm in size (series 3, image 27 and 15, respectively), previously characterized as hemangiomas.  There is a 3rd hypodense lesion with peripheral enhancement measuring 0.7 cm in the left hepatic lobe (series 3, image 19), too small to definitively characterize and may also represent additional hemangioma, not significantly changed since 02/15/2022.  No new suspicious hepatic lesion.     Gallbladder: No calcified gallstones or evidence of pericholecystic inflammation.     Bile Ducts: No evidence of dilated ducts.     Pancreas: No definite mass or peripancreatic fat stranding.     Spleen: Within normal limits.     Adrenals: No significant abnormality.     Kidneys/ Ureters: Multiple right-sided in single left renal cortical hypodensities, some which are too small to characterize, most compatible with.  The largest measures 1.6 cm in the upper pole of the right kidney.  Bilateral extrarenal pelves with mild, right greater than left, hydronephrosis.  No ureterolithiasis.     Bladder: No evidence of wall thickening.     Reproductive organs: Postoperative changes of hysterectomy and bilateral salpingo-oophorectomy.  No evidence of residual recurrent soft tissue mass.     GI tract/Mesentery:   No evidence of bowel obstruction or inflammation. Appendix has been reportedly removed.     Peritoneal Space: No ascites. No free air.     Lymph nodes: No pathologically enlarged lymph nodes.     Soft tissues: No significant abnormality.     Vasculature: No significant atherosclerosis. No aortic aneurysm.     Bones:  Mild age-appropriate degenerative change.  Stable-appearing nonspecific 0.9 cm rounded sclerotic focus in the left iliac bone, unchanged since 02/15/2022.       Assessment:       1. Malignant  tumor of unknown origin    2. Pulmonary nodule    3. 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  -Molecular markers were suggestive of intestinal origin  -Patient underwent colonoscopy and EGD without pathology found  -In addition patient underwent complete hysterectomy with right salpingo oophorectomy and extensive abdominal surgery without additional disease found  -Repeat PET/CT 7/05/22 showed no residual disease  -EUS on 8/03/22 showed no disease in the esophagus, stomach, pancreas, liver or vessels  -Liver lesion seen on EUS was discussed with Dr Echols today whom stated the [id number] lesion was a hemangioma  -Pt with presumed malignancy only in the ovary and would be classified as stage 1  -Treatment with FOLFOX discussed given suggestion of GI primary; however, the patient declined chemotherapy  -The patient understood that there is a higher risk of recurrence without adjuvant treatment  -The patient was enrolled in the STRATA [place] Trial   -Analysis done on 8/26/22 of the tumor removed on 3/10/22 showed a TP53 mutation as well as variance of unknown significance with GRM8, MAGEA1, NLRP1, PLAG1, TET1  -Blood collected 8/25/22, 11/03/22 and 2/06/23 showed no tumor molecules detected.  -Pt to undergo repeat blood test for SENTINEL trial for circulating DNA 5/01/23  -PT to undergo repeat staging scans in 2 months with return visit    Pulmonary Nodule - pt with 2mm nodule in the right apical segment of the RUL  -Will monitor on CT scans in 2 months    Hot Flashes - likely due to prior oopherectomy  -Pt tp see gynecology    Route Chart for Scheduling    Med Onc Chart Routing      Follow up with physician Other. Pt needs a CBC and CMP today.  She needs a CT C/A/P the week fo 5/22/23.  She needs a CBC, CMP the day of [facility] CT Scan.  She will need an appt with me a week after the scan.  The patient needs an appt with an  OBGYN for hot flashes.   Follow up with YADIRA    Infusion scheduling note    Injection scheduling note    Labs    Imaging    Pharmacy appointment    Other referrals                Jameson Hou MD  [facility]  Hematology and Oncology  [facility]   [address] [facility] [place] [address]   O: [phone]  F: [phone]

## 2023-03-14 ENCOUNTER — OFFICE VISIT (OUTPATIENT)
Dept: HEMATOLOGY/ONCOLOGY | Facility: CLINIC | Age: 33
End: 2023-03-14
Payer: COMMERCIAL

## 2023-03-14 VITALS
HEART RATE: 89 BPM | SYSTOLIC BLOOD PRESSURE: 106 MMHG | WEIGHT: 121.25 LBS | OXYGEN SATURATION: 95 % | TEMPERATURE: 98 F | HEIGHT: 61 IN | BODY MASS INDEX: 22.89 KG/M2 | DIASTOLIC BLOOD PRESSURE: 70 MMHG

## 2023-03-14 DIAGNOSIS — C80.1: Primary | ICD-10-CM

## 2023-03-14 DIAGNOSIS — R23.2 HOT FLASHES: ICD-10-CM

## 2023-03-14 DIAGNOSIS — R91.1 PULMONARY NODULE: ICD-10-CM

## 2023-03-14 PROCEDURE — 3078F PR MOST RECENT DIASTOLIC BLOOD PRESSURE < 80 MM HG: ICD-10-PCS | Mod: CPTII,S$GLB,, | Performed by: INTERNAL MEDICINE

## 2023-03-14 PROCEDURE — 99214 PR OFFICE/OUTPT VISIT, EST, LEVL IV, 30-39 MIN: ICD-10-PCS | Mod: S$GLB,,, | Performed by: INTERNAL MEDICINE

## 2023-03-14 PROCEDURE — 3078F DIAST BP <80 MM HG: CPT | Mod: CPTII,S$GLB,, | Performed by: INTERNAL MEDICINE

## 2023-03-14 PROCEDURE — 3074F SYST BP LT 130 MM HG: CPT | Mod: CPTII,S$GLB,, | Performed by: INTERNAL MEDICINE

## 2023-03-14 PROCEDURE — 1159F MED LIST DOCD IN RCRD: CPT | Mod: CPTII,S$GLB,, | Performed by: INTERNAL MEDICINE

## 2023-03-14 PROCEDURE — 1159F PR MEDICATION LIST DOCUMENTED IN MEDICAL RECORD: ICD-10-PCS | Mod: CPTII,S$GLB,, | Performed by: INTERNAL MEDICINE

## 2023-03-14 PROCEDURE — 3008F PR BODY MASS INDEX (BMI) DOCUMENTED: ICD-10-PCS | Mod: CPTII,S$GLB,, | Performed by: INTERNAL MEDICINE

## 2023-03-14 PROCEDURE — 99999 PR PBB SHADOW E&M-EST. PATIENT-LVL III: CPT | Mod: PBBFAC,,, | Performed by: INTERNAL MEDICINE

## 2023-03-14 PROCEDURE — 99214 OFFICE O/P EST MOD 30 MIN: CPT | Mod: S$GLB,,, | Performed by: INTERNAL MEDICINE

## 2023-03-14 PROCEDURE — 99999 PR PBB SHADOW E&M-EST. PATIENT-LVL III: ICD-10-PCS | Mod: PBBFAC,,, | Performed by: INTERNAL MEDICINE

## 2023-03-14 PROCEDURE — 3008F BODY MASS INDEX DOCD: CPT | Mod: CPTII,S$GLB,, | Performed by: INTERNAL MEDICINE

## 2023-03-14 PROCEDURE — 3074F PR MOST RECENT SYSTOLIC BLOOD PRESSURE < 130 MM HG: ICD-10-PCS | Mod: CPTII,S$GLB,, | Performed by: INTERNAL MEDICINE

## 2023-04-14 ENCOUNTER — PATIENT MESSAGE (OUTPATIENT)
Dept: RESEARCH | Facility: HOSPITAL | Age: 33
End: 2023-04-14
Payer: COMMERCIAL

## 2023-05-01 ENCOUNTER — RESEARCH ENCOUNTER (OUTPATIENT)
Dept: RESEARCH | Facility: HOSPITAL | Age: 33
End: 2023-05-01

## 2023-05-01 NOTE — PROGRESS NOTES
Name: Francis Nagel  MRN [id number]  [id number] (SENTINEL)  IRB# [id number]  PID: [id number]  DATE: May 1st, 2023    [place] Visit 3 (12-week MRD monitoring):  The patient presented at the [facility] for her visit 3, MRD monitoring blood draw.   This CRC met the patient in the phlebotomy lab of the [facility]. Required blood specimens were collected by phlebotomist using two patient identifiers with CRC present. This CRC informed the patient that Dr. Hou would report her MRD results at the next visit. During the visit, the patient mentioned that she is going to soon be getting off of her current insurance. The patient questioned if her insurance currently pays for the [place] trial blood draws. This CRC reassured the patient that all procedures conducted for the study that are not part of her usual standard of care, are covered by the study. Explained to the patient that her standard of care labs, procedures, and MD visits are billed to her insurance and are not covered by the trial. The patient expressed understanding and denied having any more questions.  Blood specimens shipped via [employer].    Adverse Events: No adverse events or serious adverse events related to the blood draw were noted    Next 12-week MRD Blood Collection (Visit 3) due July 27th, 2023 (+/- 15 days)    DOMENIC Gracia

## 2023-05-23 ENCOUNTER — HOSPITAL ENCOUNTER (OUTPATIENT)
Dept: RADIOLOGY | Facility: HOSPITAL | Age: 33
Discharge: HOME | End: 2023-05-23
Attending: INTERNAL MEDICINE
Payer: MEDICAID

## 2023-05-23 DIAGNOSIS — C80.1: ICD-10-CM

## 2023-05-23 PROCEDURE — 74177 CT ABD & PELVIS W/CONTRAST: CPT | Mod: 26,,, | Performed by: RADIOLOGY

## 2023-05-23 PROCEDURE — 71260 CT THORAX DX C+: CPT | Mod: 26,,, | Performed by: RADIOLOGY

## 2023-05-23 PROCEDURE — 71260 CT CHEST ABDOMEN PELVIS WITH CONTRAST (XPD): ICD-10-PCS | Mod: 26,,, | Performed by: RADIOLOGY

## 2023-05-23 PROCEDURE — 25500020 PHARM REV CODE 255: Mod: PO | Performed by: INTERNAL MEDICINE

## 2023-05-23 PROCEDURE — A9698 NON-RAD CONTRAST MATERIALNOC: HCPCS | Mod: PO | Performed by: INTERNAL MEDICINE

## 2023-05-23 PROCEDURE — 74177 CT CHEST ABDOMEN PELVIS WITH CONTRAST (XPD): ICD-10-PCS | Mod: 26,,, | Performed by: RADIOLOGY

## 2023-05-23 PROCEDURE — 71260 CT THORAX DX C+: CPT | Mod: TC,PO

## 2023-05-23 PROCEDURE — 74177 CT ABD & PELVIS W/CONTRAST: CPT | Mod: TC,PO

## 2023-05-23 RX ADMIN — IOHEXOL 75 ML: 350 INJECTION, SOLUTION INTRAVENOUS at 09:05

## 2023-05-23 RX ADMIN — BARIUM SULFATE 900 ML: 20 SUSPENSION ORAL at 09:05

## 2023-07-05 ENCOUNTER — OFFICE VISIT (OUTPATIENT)
Dept: HEMATOLOGY/ONCOLOGY | Facility: CLINIC | Age: 33
End: 2023-07-05
Payer: MEDICAID

## 2023-07-05 VITALS
RESPIRATION RATE: 18 BRPM | SYSTOLIC BLOOD PRESSURE: 116 MMHG | HEIGHT: 61 IN | DIASTOLIC BLOOD PRESSURE: 83 MMHG | HEART RATE: 81 BPM | WEIGHT: 124.75 LBS | TEMPERATURE: 98 F | BODY MASS INDEX: 23.55 KG/M2 | OXYGEN SATURATION: 96 %

## 2023-07-05 DIAGNOSIS — R91.1 PULMONARY NODULE: ICD-10-CM

## 2023-07-05 DIAGNOSIS — C80.1: Primary | ICD-10-CM

## 2023-07-05 PROCEDURE — 99213 PR OFFICE/OUTPT VISIT, EST, LEVL III, 20-29 MIN: ICD-10-PCS | Mod: S$PBB,,, | Performed by: INTERNAL MEDICINE

## 2023-07-05 PROCEDURE — 99999 PR PBB SHADOW E&M-EST. PATIENT-LVL III: ICD-10-PCS | Mod: PBBFAC,,, | Performed by: INTERNAL MEDICINE

## 2023-07-05 PROCEDURE — 99213 OFFICE O/P EST LOW 20 MIN: CPT | Mod: PBBFAC,PN | Performed by: INTERNAL MEDICINE

## 2023-07-05 PROCEDURE — 99999 PR PBB SHADOW E&M-EST. PATIENT-LVL III: CPT | Mod: PBBFAC,,, | Performed by: INTERNAL MEDICINE

## 2023-07-05 PROCEDURE — 99213 OFFICE O/P EST LOW 20 MIN: CPT | Mod: S$PBB,,, | Performed by: INTERNAL MEDICINE

## 2023-07-05 NOTE — PROGRESS NOTES
PATIENT: Francis Nagel  MRN: [id number]  DATE: 7/5/2023      Diagnosis:   1. Malignant tumor of unknown origin    2. Pulmonary nodule              Chief Complaint: Follow-up (Malignant tumor of unknown origin)        Oncologic History:      Oncologic History 2/04/22 US Pelvis  2/15/22 CT Abdomen and Pelvis  3/10/22 robotic salpingo oophorectomy of the left ovary  4/11/22 EGD Colonoscopy  4/21/22 PET/CT  5/13/22 robotic assisted total laparoscopic hysterectomy, right salpingo oophorectomy, bilateral pelvic and periaortic lymph node dissection, omentectomy, peritoneal biopsies, and appendectomy  6/29/22 - PET/CT  8/03/22 - EUS  11/21/22 CT C/A/P  5/23/23 CT C/A/P    Oncologic Treatment 3/10/22 - robotic salpingo oophorectomy of the left ovary    Pathology 3/10/22 adenocarcinoma intestinal phenotype CK7+, 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  5/13/22  no sign of malignancy and 16 negative lymph nodes      The patient initially presented to see Dr Johnson on 2/03/22 with complaint of pelvic pain.  US of the pelvis on 2/04/22 showed a 15.7 x 7.9 x 11.1 complex multi-septated cystic right ovarian mass replacing the entire right ovary.  CT of the abdomen and pelvis on 02/15/2022 showed a large complex pelvic mass centered above an left lateral to the uterus measuring 16.7 cm in greatest dimension.  The patient then saw gynecologist oncologist Dr. Cintron on 02/16/2022.  She then saw Dr. Delacruz on 02/21/2022.  The patient then underwent robotic salpingo oophorectomy of the left ovary under the care of Dr. 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  The patient then underwent EGD and colonoscopy on 04/11/2022 showing gastritis, antritis, and internal hemorrhoids.  PET-CT performed 04/21/2022 showed mildly increased uptake in the thyroid g gland bilaterally in keeping with thyroiditis; multifocal activity in the right adnexal region with maximum SUV of 6.6 atypical for  physiologic ovarian uptake in a premenopausal woman; punctate area of low-grade FDG activity localized to the left subserosal aspect of the uterus; diffusely increased uptake in the gastric cardia and body without discrete nodularity or mass.  The patient then underwent robotic assisted total laparoscopic hysterectomy, right salpingo oophorectomy, bilateral pelvic and periaortic lymph node dissection, omentectomy, peritoneal biopsies, and appendectomy under the care of Dr. Delacruz on 05/13/2022 with pathology showing no sign of malignancy and 16 negative lymph nodes.  The patient was then admitted from [address] [place] [address] for postoperative pelvic abscess with placement of peritoneal drain.  Cultures grew out Klebsiella with sensitivities to Bactrim.  Patient was discussed at tumor Board on 06/08/2022 with recommendations to check CEA and CA 19 9, obtained MRI abdomen and pelvis with MRCP and hold adjuvant chemotherapy pending further workup.  The patient underwent MRCP on 06/22/2022 showing 11 mm subcapsular mass in segment 3 of the liver concerning for benign cavernous hemangioma; similar lesion in segment 4A measuring 6 mm; stable with respect of prior scan on 02/15/2022.  MRCP sequence a demonstrated normal hepatic biliary structures with normal pancreatic duct.   The patient underwent PET-CT on 06/29/2022 showing 17 x 14 mm fluid density along the right pelvic sidewall likely residual fluid from postop abscess consistent with small seroma or lymphocele and no evidence of recurrence or metastatic disease.   The patient underwent EUS with Dr Echols on 8/03/22 showing a lesion in the left lobe of the liver measuring [id number] and normal appearance of the pancreas, gallbladder, esophagus, stomach, duodenum and vessels.   The patient underwent initiation into the [employer]ATA SENTINEL trial.  Tumor assessed on 8/26/22 showed a TP53 mutation as well as variance of unknown significance with GRM8, MAGEA1, NLRP1, PLAG1, TET1.  Blood  collected 8/25/22 showed no tumor molecules detected.   The patient underwent CT of the chest, abdomen, and pelvis on 11/21/2022 showing a 2 mm nodule in the apical segment of the right upper lobe; small arterially enhancing lesions within the left hepatic lobe previously characterized as hemangiomas; hypodense lesion in the left hepatic lobe characterized as hemangioma; and stable appearing nonspecific 0.9 cm rounded sclerotic focus in the left iliac bone.  Blood collected on 11/03/2022 for the sentinel trial showed no circulating tumor DNA.  Subjective:    Interval History:  Ms. Nagel is a 32 y.o. female with anemia who presents for work up of metastatic cancer.  Since the last clinic visit the patient had circulating tumor DNA checked via the sentinel trial on 5/01/23 with no circulating tumor DNA found.  PT underwent CT C/A/P on 5/23/23 showing a 2 mm micronodule in the apical aspect of the right upper lobe; 2 arterial enhancing lesions in the left hepatic lobe which are stable and a 0.7 cm hemangioma in the lateral aspect of the left hepatic lobe.  The patient denies CP, cough, SOB, abdominal pain, nausea, vomiting, constipation, diarrhea.  The patient denies fever, chills, night sweats, weight loss, new lumps or bumps, easy bruising or bleeding.    Past Medical History:   Past Medical History:   Diagnosis Date    Anemia 03/11/2022    Encounter for blood transfusion 03/11/2022    Mass of left ovary 03/10/2022    JOSE MIGUEL.     LEFT OVARY SHOWS ADENOCARCINOMA, INTESTINAL PHENOTYPE MEASURING 15 CM    Metastatic cancer 4/11/2022    PONV (postoperative nausea and vomiting)     S/P unilateral salpingo-oophorectomy/mass resection 03/10/2022       Past Surgical HIstory:   Past Surgical History:   Procedure Laterality Date    COLONOSCOPY N/A 4/11/2022    Procedure: COLONOSCOPY;  Surgeon: Eric Meléndez Jr., MD;  Location: [facility];  Service: Endoscopy;  Laterality: N/A;    ENDOSCOPIC ULTRASOUND OF UPPER  GASTROINTESTINAL TRACT Left 8/3/2022    Procedure: ULTRASOUND, UPPER GI TRACT, ENDOSCOPIC;  Surgeon: Richar Echols MD;  Location: [facility];  Service: Endoscopy;  Laterality: Left;    ESOPHAGOGASTRODUODENOSCOPY N/A 4/11/2022    Procedure: EGD (ESOPHAGOGASTRODUODENOSCOPY);  Surgeon: Eric Meléndez Jr., MD;  Location: [facility] ENDO;  Service: Endoscopy;  Laterality: N/A;    ESOPHAGOGASTRODUODENOSCOPY N/A 8/3/2022    Procedure: EGD (ESOPHAGOGASTRODUODENOSCOPY);  Surgeon: Richar Echols MD;  Location: [facility];  Service: Endoscopy;  Laterality: N/A;    ROBOT-ASSISTED APPENDECTOMY  5/13/2022    Procedure: ROBOTIC APPENDECTOMY;  Surgeon: Charmaine Delacruz MD;  Location: [facility];  Service: OB/GYN;;    ROBOT-ASSISTED LAPAROSCOPIC ABDOMINAL HYSTERECTOMY USING DA DARVIN XI N/A 5/13/2022    Procedure: XI ROBOTIC HYSTERECTOMY;  Surgeon: Charmaine Delacruz MD;  Location: [facility];  Service: OB/GYN;  Laterality: N/A;    ROBOT-ASSISTED LAPAROSCOPIC OMENTECTOMY USING DA DARVIN XI N/A 5/13/2022    Procedure: XI ROBOTIC OMENTECTOMY;  Surgeon: Charmaine Delacruz MD;  Location: [facility];  Service: OB/GYN;  Laterality: N/A;    ROBOT-ASSISTED LAPAROSCOPIC SALPINGO-OOPHORECTOMY USING DA DARVIN XI Left 3/10/2022    Procedure: XI ROBOTIC SALPINGO-OOPHORECTOMY/ USO;  Surgeon: Charmaine Delacruz MD;  Location: [address];  Service: OB/GYN;  Laterality: Left;    ROBOT-ASSISTED LAPAROSCOPIC SURGICAL REMOVAL OF OVARY USING DA DARVIN XI Right 5/13/2022    Procedure: XI ROBOTIC OOPHORECTOMY;  Surgeon: Charmaine Delacruz MD;  Location: [facility];  Service: OB/GYN;  Laterality: Right;    ROBOT-ASSISTED SURGICAL REMOVAL OF FALLOPIAN TUBE USING DA DARVIN XI Bilateral 5/13/2022    Procedure: XI ROBOTIC SALPINGECTOMY;  Surgeon: Charmaine Delacruz MD;  Location: [facility];  Service: OB/GYN;  Laterality: Bilateral;       Family History:   Family History   Problem Relation Age of Onset    Breast cancer Maternal Aunt     Lung cancer Paternal Uncle     Colon  "cancer Neg Hx     Ovarian cancer Neg Hx        Social History:  reports that she has never smoked. She has never used smokeless tobacco. She reports current alcohol use. She reports that she does not use drugs.    Allergies:  Review of patient's allergies indicates:  No Known Allergies    Medications:  Current Outpatient Medications   Medication Sig Dispense Refill    cetirizine (ZYRTEC) 10 MG tablet Take by mouth once daily.       No current facility-administered medications for this visit.       Review of Systems   Constitutional:  Negative for chills, fatigue, fever and unexpected weight change.   Respiratory:  Negative for cough and shortness of breath.    Cardiovascular:  Negative for chest pain and palpitations.   Gastrointestinal:  Negative for abdominal pain, constipation, diarrhea, nausea and vomiting.   Skin:  Negative for rash.   Neurological:  Negative for headaches.   Hematological:  Negative for adenopathy. Does not bruise/bleed easily.     ECOG Performance Status: 0   Objective:      Vitals:   Vitals:    07/05/23 1421   BP: 116/83   BP Location: Right arm   Patient Position: Sitting   BP Method: Medium (Automatic)   Pulse: 81   Resp: 18   Temp: 97.5 °F (36.4 °C)   TempSrc: Temporal   SpO2: 96%   Weight: 56.6 kg (124 lb 12.5 oz)   Height: 5' 1" (1.549 m)           Physical Exam  Constitutional:       General: She is not in acute distress.     Appearance: She is well-developed. She is not diaphoretic.   HENT:      Head: Normocephalic and atraumatic.   Cardiovascular:      Rate and Rhythm: Normal rate and regular rhythm.      Heart sounds: Normal heart sounds. No murmur heard.    No friction rub. No gallop.   Pulmonary:      Effort: Pulmonary effort is normal. No respiratory distress.      Breath sounds: Normal breath sounds. No wheezing or rales.   Chest:      Chest wall: No tenderness.   Abdominal:      General: Bowel sounds are normal. There is no distension.      Palpations: Abdomen is soft. There is " no mass.      Tenderness: There is no abdominal tenderness. There is no guarding or rebound.   Lymphadenopathy:      Cervical: No cervical adenopathy.      Upper Body:      Right upper body: No supraclavicular or axillary adenopathy.      Left upper body: No supraclavicular or axillary adenopathy.   Skin:     Findings: No erythema or rash.   Neurological:      Mental Status: She is alert and oriented to person, place, and time.   Psychiatric:         Behavior: Behavior normal.       Laboratory Data:  No visits with results within 1 Week(s) from this visit.   Latest known visit with results is:   Lab Visit on 05/23/2023   Component Date Value Ref Range Status    WBC 05/23/2023 4.43  3.90 - 12.70 K/uL Final    RBC 05/23/2023 3.93 (L)  4.00 - 5.40 M/uL Final    Hemoglobin 05/23/2023 12.2  12.0 - 16.0 g/dL Final    Hematocrit 05/23/2023 36.1 (L)  37.0 - 48.5 % Final    MCV 05/23/2023 92  82 - 98 fL Final    MCH 05/23/2023 31.0  27.0 - 31.0 pg Final    MCHC 05/23/2023 33.8  32.0 - 36.0 g/dL Final    RDW 05/23/2023 11.2 (L)  11.5 - 14.5 % Final    Platelets 05/23/2023 241  150 - 450 K/uL Final    MPV 05/23/2023 9.0 (L)  9.2 - 12.9 fL Final    Immature Granulocytes 05/23/2023 0.2  0.0 - 0.5 % Final    Gran # (ANC) 05/23/2023 2.0  1.8 - 7.7 K/uL Final    Immature Grans (Abs) 05/23/2023 0.01  0.00 - 0.04 K/uL Final    Comment: Mild elevation in immature granulocytes is non specific and   can be seen in a variety of conditions including stress response,   acute inflammation, trauma and pregnancy. Correlation with other   laboratory and clinical findings is essential.      Lymph # 05/23/2023 1.9  1.0 - 4.8 K/uL Final    Mono # 05/23/2023 0.4  0.3 - 1.0 K/uL Final    Eos # 05/23/2023 0.1  0.0 - 0.5 K/uL Final    Baso # 05/23/2023 0.02  0.00 - 0.20 K/uL Final    nRBC 05/23/2023 0  0 /100 WBC Final    Gran % 05/23/2023 44.5  38.0 - 73.0 % Final    Lymph % 05/23/2023 43.1  18.0 - 48.0 % Final    Mono % 05/23/2023 8.8  4.0 - 15.0 %  Final    Eosinophil % 05/23/2023 2.9  0.0 - 8.0 % Final    Basophil % 05/23/2023 0.5  0.0 - 1.9 % Final    Differential Method 05/23/2023 Automated   Final    Sodium 05/23/2023 141  136 - 145 mmol/L Final    Potassium 05/23/2023 3.9  3.5 - 5.1 mmol/L Final    Chloride 05/23/2023 105  95 - 110 mmol/L Final    CO2 05/23/2023 27  23 - 29 mmol/L Final    Glucose 05/23/2023 82  70 - 110 mg/dL Final    BUN 05/23/2023 8  6 - 20 mg/dL Final    Creatinine 05/23/2023 0.6  0.5 - 1.4 mg/dL Final    Calcium 05/23/2023 9.5  8.7 - 10.5 mg/dL Final    Total Protein 05/23/2023 8.1  6.0 - 8.4 g/dL Final    Albumin 05/23/2023 4.1  3.5 - 5.2 g/dL Final    Total Bilirubin 05/23/2023 0.5  0.1 - 1.0 mg/dL Final    Comment: For infants and newborns, interpretation of results should be based  on gestational age, weight and in agreement with clinical  observations.    Premature Infant recommended reference ranges:  Up to 24 hours.............<8.0 mg/dL  Up to 48 hours............<12.0 mg/dL  3-5 days..................<15.0 mg/dL  6-29 days.................<15.0 mg/dL      Alkaline Phosphatase 05/23/2023 57  55 - 135 U/L Final    AST 05/23/2023 13  10 - 40 U/L Final    ALT 05/23/2023 17  10 - 44 U/L Final    Anion Gap 05/23/2023 9  8 - 16 mmol/L Final    eGFR 05/23/2023 >60  >60 mL/min/1.73 m^2 Final         Imaging:     CT C/A/P 5/23/23  Chest:     A 2 mm micronodule in the apical aspect of the right upper lobe is not visualized on today's examination.  No new nodules have developed.  The lungs are well aerated and clear.  No lymphadenopathy, pleural effusion, or pericardial effusion are present.  Heart size is normal.  The thoracic inlet and axillary regions are unremarkable.  Chest wall structures appear normal.     Abdomen/pelvis:     Two arterially enhancing nodules in the left hepatic lobe remain unchanged from the previous study.  On series 3 there is a 0.7 cm nodule in the medial segment of the left hepatic lobe on image 15 and a 1.4  cm nodule in the lateral segment of the left hepatic lobe on image 27.  A presumed 0.7 cm hemangioma in the lateral segment of the left hepatic lobe is unchanged on image 21.  No new hepatic nodules have developed.  The gallbladder and bile ducts are normal.  The spleen, pancreas, and adrenal glands are normal in size and appearance.  Multiple bilateral renal cysts are unchanged in size and appearance.  The aorta tapers normally.  No lymph node enlargement, ascites, or inflammatory changes are evident in the abdomen or pelvis.  There are postoperative changes of hysterectomy and bilateral oophorectomy.  The bladder is unremarkable.  A benign 0.9 cm left iliac bone island is unchanged.  Abdominal wall structures appear normal.       Assessment:       1. Malignant tumor of unknown origin    2. 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  -Molecular markers were suggestive of intestinal origin  -Patient underwent colonoscopy and EGD without pathology found  -In addition patient underwent complete hysterectomy with right salpingo oophorectomy and extensive abdominal surgery without additional disease found  -Repeat PET/CT 7/05/22 showed no residual disease  -EUS on 8/03/22 showed no disease in the esophagus, stomach, pancreas, liver or vessels  -Liver lesion seen on EUS was discussed with Dr Echols today whom stated the [id number] lesion was a hemangioma  -Pt with presumed malignancy only in the ovary and would be classified as stage 1  -Treatment with FOLFOX discussed given suggestion of GI primary; however, the patient declined chemotherapy  -The patient understood that there is a higher risk of recurrence without adjuvant treatment  -The patient was enrolled in the [facility] [place] Trial   -Analysis done on 8/26/22 of the tumor removed on 3/10/22 showed a TP53 mutation as well as variance of unknown significance with  GRM8, MAGEA1, NLRP1, PLAG1, TET1  -Blood collected 8/25/22, 11/03/22,  2/06/23, and 5/021/23 showed no tumor molecules detected.  -Scans 5/23/23 showed no evidence of residual cancer  -Will monitor [facility] SENTINEL trial circulating tumor DNA 8/2023    Pulmonary Nodule - stable on scnas  -Will monitor    Route Chart for Scheduling    Med Onc Chart Routing      Follow up with physician 3 months. The patient needs blood work for the SENTINEL trial 8/2023.  Whe needs an appt with me 3 weeks after [facility] SENTINEL trial blood work is done with CBC adn CMP prior to the appt.   Follow up with YADIRA    Infusion scheduling note    Injection scheduling note    Labs    Imaging    Pharmacy appointment    Other referrals                 Jameson Hou MD  [facility]  Hematology and Oncology  [facility]   [address] [facility]   [place] [address]   O: [phone]  F: [phone]

## 2023-07-06 ENCOUNTER — PATIENT MESSAGE (OUTPATIENT)
Dept: RESEARCH | Facility: HOSPITAL | Age: 33
End: 2023-07-06
Payer: MEDICAID

## 2023-08-04 ENCOUNTER — RESEARCH ENCOUNTER (OUTPATIENT)
Dept: RESEARCH | Facility: HOSPITAL | Age: 33
End: 2023-08-04
Payer: MEDICAID

## 2023-08-04 NOTE — PROGRESS NOTES
"Name: Francis Nagel  [id number] (SENTINEL)  IRB# [id number]  PID: [id number]  8/4/2023    Sweet Visit 4 (12-week MRD monitoring):  The patient presented at the [facility] for her visit 4 , MRD monitoring blood draw.   This CRC met the patient in the phlebotomy lab of the [facility]. Required blood specimens were collected by phlebotomist using two patient identifiers with CRC present. This CRC informed the patient that Dr. Hou would report her MRD results at the next visit.   [facility]    Adverse Events: No adverse events or serious adverse events related to the blood draw were noted.    The patient Has NOT received any Adjuvant/Systemic Therapy or Radiation therapy since her last Sweet visit    The patient Has received SOC Imaging.  5/23/23 CT Chest Abd Pel reported,    "1. Status post hysterectomy and bilateral oophorectomy with no evidence of residual or recurrent neoplasm and no evidence of metastatic disease.  2. Interval disappearance of the 2 mm micronodule in the right upper lobe.  No new nodules have developed.  3. Hepatic nodules which are unchanged in size and appearance.  No new hepatic nodules have developed.  4. Bilateral renal cysts.  5. Left iliac bone island which is unchanged"     The patient Has NOT received any Laboratory Recurrence Assessments since her last Sweet visit.    Next 12-week MRD Blood Collection (Visit 5) due October 19th, 2023 (+/- 15 days)    - Planning to collect at the patient's pre-existing 10/11/23 lab appointment.    Crys Ross, CRC     "

## 2023-09-21 ENCOUNTER — TELEPHONE (OUTPATIENT)
Dept: RESEARCH | Facility: HOSPITAL | Age: 33
End: 2023-09-21
Payer: MEDICAID

## 2023-09-21 NOTE — TELEPHONE ENCOUNTER
9/21/23  15:25    Called the patient to inform her of the [place] trial's impending closure. The patient did not answer the phone. A voicemail was left with instructions to call back or send me a [employer] message at her convenience.      Crys Cevallos, CRC

## 2023-09-26 ENCOUNTER — TELEPHONE (OUTPATIENT)
Dept: RESEARCH | Facility: HOSPITAL | Age: 33
End: 2023-09-26
Payer: MEDICAID

## 2023-09-26 ENCOUNTER — PATIENT MESSAGE (OUTPATIENT)
Dept: RESEARCH | Facility: HOSPITAL | Age: 33
End: 2023-09-26
Payer: MEDICAID

## 2023-09-26 NOTE — TELEPHONE ENCOUNTER
"Name: Francis Nagel  [id number] (SENTINEL)  IRB# [id number]  PID: [id number]  9/26/2023  09:00    Notification of [place] Trial Study Closure:    Over the phone, the patient was informed that [facility] was recently notified by [employer] representatives that the [place] trial will be permanently closing on October 2nd, 2023.  CRC's were instructed by [facility] to complete all study procedures, including any participant blood draws due on or before 10/2/23, by 10/2/23.    Explained to the patient that [employer] said the reason for closure was due to " Statistical Analysis of [place] MRD results indicates that the test offers no additional or increased benefits to patients in comparison to current marketed alternatives"     The patient was presented with the option of moving her next sentinel trial lab appointment to 9/29/23 or 10/2/23 before the study closes (within her study window). The patient reported that she will be out of town at this time and therefore denied moving the appointment. This CRC offered to attend the patient's 10/11/23 lab appointment to answer any trial closure questions that the patient may have. The patient agreed to this plan.    The patient was informed that future correspondence about the study closing would be forthcoming in either a [employer] message or physical letter. Encouraged the patient to talk to her doctor about other clinical options for ctDNA/MRD testing, such as [employer] test, if she is interested in continuing MRD testing with another company. Explained to the patient that she should continue to follow up with her doctor per SOC methods.     The patient was queried on if she had any questions regarding the study closing, to which the patient denied. This CRC apologized to the patient for any inconvenience the study closure may have caused and thanked the patient for her participation in the trial.The patient was encouraged to contact her doctor, the PI on the study consent form, or " this CRC with any questions or concerns about study closure.    Crys Cevallos, CRC

## 2023-12-18 NOTE — PROGRESS NOTES
PATIENT: Francis Nagel  MRN: [id number]  DATE: 12/19/2023      Diagnosis:   1. Malignant tumor of unknown origin    2. Abdominal pain, unspecified abdominal location    3. Pulmonary nodule                Chief Complaint: Malignant tumor of unknown origin        Oncologic History:      Oncologic History 2/04/22 US Pelvis  2/15/22 CT Abdomen and Pelvis  3/10/22 robotic salpingo oophorectomy of the left ovary  4/11/22 EGD Colonoscopy  4/21/22 PET/CT  5/13/22 robotic assisted total laparoscopic hysterectomy, right salpingo oophorectomy, bilateral pelvic and periaortic lymph node dissection, omentectomy, peritoneal biopsies, and appendectomy  6/29/22 - PET/CT  8/03/22 - EUS  11/21/22 CT C/A/P  5/23/23 CT C/A/P    Oncologic Treatment 3/10/22 - robotic salpingo oophorectomy of the left ovary    Pathology 3/10/22 adenocarcinoma intestinal phenotype CK7+, 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  5/13/22  no sign of malignancy and 16 negative lymph nodes      The patient initially presented to see Dr Johnson on 2/03/22 with complaint of pelvic pain.  US of the pelvis on 2/04/22 showed a 15.7 x 7.9 x 11.1 complex multi-septated cystic right ovarian mass replacing the entire right ovary.  CT of the abdomen and pelvis on 02/15/2022 showed a large complex pelvic mass centered above an left lateral to the uterus measuring 16.7 cm in greatest dimension.  The patient then saw gynecologist oncologist Dr. Cintron on 02/16/2022.  She then saw Dr. Delacruz on 02/21/2022.  The patient then underwent robotic salpingo oophorectomy of the left ovary under the care of Dr. 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  The patient then underwent EGD and colonoscopy on 04/11/2022 showing gastritis, antritis, and internal hemorrhoids.  PET-CT performed 04/21/2022 showed mildly increased uptake in the thyroid g gland bilaterally in keeping with thyroiditis; multifocal activity in the right adnexal  region with maximum SUV of 6.6 atypical for physiologic ovarian uptake in a premenopausal woman; punctate area of low-grade FDG activity localized to the left subserosal aspect of the uterus; diffusely increased uptake in the gastric cardia and body without discrete nodularity or mass.  The patient then underwent robotic assisted total laparoscopic hysterectomy, right salpingo oophorectomy, bilateral pelvic and periaortic lymph node dissection, omentectomy, peritoneal biopsies, and appendectomy under the care of Dr. Delacruz on 05/13/2022 with pathology showing no sign of malignancy and 16 negative lymph nodes.  The patient was then admitted from [address] [place] [address] for postoperative pelvic abscess with placement of peritoneal drain.  Cultures grew out Klebsiella with sensitivities to Bactrim.  Patient was discussed at tumor Board on 06/08/2022 with recommendations to check CEA and CA 19 9, obtained MRI abdomen and pelvis with MRCP and hold adjuvant chemotherapy pending further workup.  The patient underwent MRCP on 06/22/2022 showing 11 mm subcapsular mass in segment 3 of the liver concerning for benign cavernous hemangioma; similar lesion in segment 4A measuring 6 mm; stable with respect of prior scan on 02/15/2022.  MRCP sequence a demonstrated normal hepatic biliary structures with normal pancreatic duct.   The patient underwent PET-CT on 06/29/2022 showing 17 x 14 mm fluid density along the right pelvic sidewall likely residual fluid from postop abscess consistent with small seroma or lymphocele and no evidence of recurrence or metastatic disease.   The patient underwent EUS with Dr Echols on 8/03/22 showing a lesion in the left lobe of the liver measuring [id number] and normal appearance of the pancreas, gallbladder, esophagus, stomach, duodenum and vessels.   The patient underwent initiation into the STRATA [place] trial.  Tumor assessed on 8/26/22 showed a TP53 mutation as well as variance of unknown significance with  GRM8, MAGEA1, NLRP1, PLAG1, TET1.  Blood collected 8/25/22 showed no tumor molecules detected.   The patient underwent CT of the chest, abdomen, and pelvis on 11/21/2022 showing a 2 mm nodule in the apical segment of the right upper lobe; small arterially enhancing lesions within the left hepatic lobe previously characterized as hemangiomas; hypodense lesion in the left hepatic lobe characterized as hemangioma; and stable appearing nonspecific 0.9 cm rounded sclerotic focus in the left iliac bone.  Blood collected on 11/03/2022 for the sentinel trial showed no circulating tumor DNA.   The patient had circulating tumor DNA checked via the sentinel trial on 5/01/23 with no circulating tumor DNA found.  PT underwent CT C/A/P on 5/23/23 showing a 2 mm micronodule in the apical aspect of the right upper lobe; 2 arterial enhancing lesions in the left hepatic lobe which are stable and a 0.7 cm hemangioma in the lateral aspect of the left hepatic lobe.  Subjective:    Interval History:  Ms. Nagel is a 33 y.o. female with anemia who presents for work up of metastatic cancer.  Since the last clinic visit the patient had blood work done on 08/04/2023 with a sentinel trial which was negative for circulating tumor DNA.  The patient endorses occasional lower abdominal pain but states that her symptoms usually occur after episodes of stress and associated with increased gas.  The patient denies CP, cough, SOB, nausea, vomiting, constipation, diarrhea.  The patient denies fever, chills, night sweats, weight loss, new lumps or bumps, easy bruising or bleeding.    Past Medical History:   Past Medical History:   Diagnosis Date    Anemia 03/11/2022    Encounter for blood transfusion 03/11/2022    Mass of left ovary 03/10/2022    JOSE MIGUEL.     LEFT OVARY SHOWS ADENOCARCINOMA, INTESTINAL PHENOTYPE MEASURING 15 CM    Metastatic cancer 4/11/2022    PONV (postoperative nausea and vomiting)     S/P unilateral salpingo-oophorectomy/mass  resection 03/10/2022       Past Surgical HIstory:   Past Surgical History:   Procedure Laterality Date    COLONOSCOPY N/A 4/11/2022    Procedure: COLONOSCOPY;  Surgeon: Eric Meléndez Jr., MD;  [facility];  Service: Endoscopy;  Laterality: N/A;    ENDOSCOPIC ULTRASOUND OF UPPER GASTROINTESTINAL TRACT Left 8/3/2022    Procedure: ULTRASOUND, UPPER GI TRACT, ENDOSCOPIC;  Surgeon: Richar Echols MD;  Location: [facility];  Service: Endoscopy;  Laterality: Left;    ESOPHAGOGASTRODUODENOSCOPY N/A 4/11/2022    Procedure: EGD (ESOPHAGOGASTRODUODENOSCOPY);  Surgeon: Eric Meléndez Jr., MD;  Location: [facility];  Service: Endoscopy;  Laterality: N/A;    ESOPHAGOGASTRODUODENOSCOPY N/A 8/3/2022    Procedure: EGD (ESOPHAGOGASTRODUODENOSCOPY);  Surgeon: Richar Echols MD;  Location: [facility];  Service: Endoscopy;  Laterality: N/A;    ROBOT-ASSISTED APPENDECTOMY  5/13/2022    Procedure: ROBOTIC APPENDECTOMY;  Surgeon: Charmaine Delacruz MD;  Location: [facility];  Service: OB/GYN;;    ROBOT-ASSISTED LAPAROSCOPIC ABDOMINAL HYSTERECTOMY USING DA DARVIN XI N/A 5/13/2022    Procedure: XI ROBOTIC HYSTERECTOMY;  Surgeon: Charmaine Delacruz MD;  Location: [facility];  Service: OB/GYN;  Laterality: N/A;    ROBOT-ASSISTED LAPAROSCOPIC OMENTECTOMY USING DA DARVIN XI N/A 5/13/2022    Procedure: XI ROBOTIC OMENTECTOMY;  Surgeon: Charmaine Delacruz MD;  Location: [facility];  Service: OB/GYN;  Laterality: N/A;    ROBOT-ASSISTED LAPAROSCOPIC SALPINGO-OOPHORECTOMY USING DA DARVIN XI Left 3/10/2022    Procedure: XI ROBOTIC SALPINGO-OOPHORECTOMY/ USO;  Surgeon: Charmaine Delacruz MD;  Location: [address];  Service: OB/GYN;  Laterality: Left;    ROBOT-ASSISTED LAPAROSCOPIC SURGICAL REMOVAL OF OVARY USING DA DARVIN XI Right 5/13/2022    Procedure: XI ROBOTIC OOPHORECTOMY;  Surgeon: Charmaine Delacruz MD;  Location: [facility];  Service: OB/GYN;  Laterality: Right;    ROBOT-ASSISTED SURGICAL REMOVAL OF FALLOPIAN TUBE USING DA DARVIN XI Bilateral  "5/13/2022    Procedure: XI ROBOTIC SALPINGECTOMY;  Surgeon: Charmaine Delacruz MD;  Location: [facility];  Service: OB/GYN;  Laterality: Bilateral;       Family History:   Family History   Problem Relation Age of Onset    Breast cancer Maternal Aunt     Lung cancer Paternal Uncle     Colon cancer Neg Hx     Ovarian cancer Neg Hx        Social History:  reports that she has never smoked. She has never used smokeless tobacco. She reports current alcohol use. She reports that she does not use drugs.    Allergies:  Review of patient's allergies indicates:  No Known Allergies    Medications:  Current Outpatient Medications   Medication Sig Dispense Refill    cetirizine (ZYRTEC) 10 MG tablet Take by mouth once daily.       No current facility-administered medications for this visit.       Review of Systems   Constitutional:  Negative for appetite change, chills, fatigue, fever and unexpected weight change.   HENT:  Negative for mouth sores.    Eyes:  Negative for visual disturbance.   Respiratory:  Negative for cough and shortness of breath.    Cardiovascular:  Negative for chest pain and palpitations.   Gastrointestinal:  Positive for abdominal pain. Negative for constipation, diarrhea, nausea and vomiting.   Genitourinary:  Negative for frequency.   Musculoskeletal:  Negative for back pain.   Skin:  Negative for rash.   Neurological:  Negative for headaches.   Hematological:  Negative for adenopathy. Does not bruise/bleed easily.   Psychiatric/Behavioral:  The patient is not nervous/anxious.        ECOG Performance Status: 0   Objective:      Vitals:   Vitals:    12/19/23 1539   BP: (!) 134/94   Pulse: 89   Resp: 16   Temp: 97.7 °F (36.5 °C)   TempSrc: Temporal   SpO2: 98%   Weight: 55.6 kg (122 lb 9.2 oz)   Height: 5' 1" (1.549 m)             Physical Exam  Constitutional:       General: She is not in acute distress.     Appearance: She is well-developed. She is not diaphoretic.   HENT:      Head: Normocephalic and " atraumatic.   Cardiovascular:      Rate and Rhythm: Normal rate and regular rhythm.      Heart sounds: Normal heart sounds. No murmur heard.     No friction rub. No gallop.   Pulmonary:      Effort: Pulmonary effort is normal. No respiratory distress.      Breath sounds: Normal breath sounds. No wheezing or rales.   Chest:      Chest wall: No tenderness.   Abdominal:      General: Bowel sounds are normal. There is no distension.      Palpations: Abdomen is soft. There is no mass.      Tenderness: There is no abdominal tenderness. There is no guarding or rebound.   Lymphadenopathy:      Cervical: No cervical adenopathy.      Upper Body:      Right upper body: No supraclavicular or axillary adenopathy.      Left upper body: No supraclavicular or axillary adenopathy.   Skin:     Findings: No erythema or rash.   Neurological:      Mental Status: She is alert and oriented to person, place, and time.   Psychiatric:         Behavior: Behavior normal.         Laboratory Data:  Lab Visit on 12/19/2023   Component Date Value Ref Range Status    WBC 12/19/2023 5.59  3.90 - 12.70 K/uL Final    RBC 12/19/2023 4.25  4.00 - 5.40 M/uL Final    Hemoglobin 12/19/2023 12.7  12.0 - 16.0 g/dL Final    Hematocrit 12/19/2023 37.8  37.0 - 48.5 % Final    MCV 12/19/2023 89  82 - 98 fL Final    MCH 12/19/2023 29.9  27.0 - 31.0 pg Final    MCHC 12/19/2023 33.6  32.0 - 36.0 g/dL Final    RDW 12/19/2023 11.5  11.5 - 14.5 % Final    Platelets 12/19/2023 271  150 - 450 K/uL Final    MPV 12/19/2023 9.1 (L)  9.2 - 12.9 fL Final    Immature Granulocytes 12/19/2023 0.2  0.0 - 0.5 % Final    Gran # (ANC) 12/19/2023 2.6  1.8 - 7.7 K/uL Final    Immature Grans (Abs) 12/19/2023 0.01  0.00 - 0.04 K/uL Final    Comment: Mild elevation in immature granulocytes is non specific and   can be seen in a variety of conditions including stress response,   acute inflammation, trauma and pregnancy. Correlation with other   laboratory and clinical findings is  essential.      Lymph # 12/19/2023 2.4  1.0 - 4.8 K/uL Final    Mono # 12/19/2023 0.4  0.3 - 1.0 K/uL Final    Eos # 12/19/2023 0.2  0.0 - 0.5 K/uL Final    Baso # 12/19/2023 0.01  0.00 - 0.20 K/uL Final    nRBC 12/19/2023 0  0 /100 WBC Final    Gran % 12/19/2023 46.3  38.0 - 73.0 % Final    Lymph % 12/19/2023 43.1  18.0 - 48.0 % Final    Mono % 12/19/2023 7.5  4.0 - 15.0 % Final    Eosinophil % 12/19/2023 2.7  0.0 - 8.0 % Final    Basophil % 12/19/2023 0.2  0.0 - 1.9 % Final    Differential Method 12/19/2023 Automated   Final    Sodium 12/19/2023 141  136 - 145 mmol/L Final    Potassium 12/19/2023 3.4 (L)  3.5 - 5.1 mmol/L Final    Chloride 12/19/2023 106  95 - 110 mmol/L Final    CO2 12/19/2023 27  23 - 29 mmol/L Final    Glucose 12/19/2023 89  70 - 110 mg/dL Final    BUN 12/19/2023 7  6 - 20 mg/dL Final    Creatinine 12/19/2023 0.6  0.5 - 1.4 mg/dL Final    Calcium 12/19/2023 9.6  8.7 - 10.5 mg/dL Final    Total Protein 12/19/2023 8.6 (H)  6.0 - 8.4 g/dL Final    Albumin 12/19/2023 4.6  3.5 - 5.2 g/dL Final    Total Bilirubin 12/19/2023 0.6  0.1 - 1.0 mg/dL Final    Comment: For infants and newborns, interpretation of results should be based  on gestational age, weight and in agreement with clinical  observations.    Premature Infant recommended reference ranges:  Up to 24 hours.............<8.0 mg/dL  Up to 48 hours............<12.0 mg/dL  3-5 days..................<15.0 mg/dL  6-29 days.................<15.0 mg/dL      Alkaline Phosphatase 12/19/2023 63  55 - 135 U/L Final    AST 12/19/2023 14  10 - 40 U/L Final    ALT 12/19/2023 19  10 - 44 U/L Final    eGFR 12/19/2023 >60.0  >60 mL/min/1.73 m^2 Final    Anion Gap 12/19/2023 8  8 - 16 mmol/L Final   Lab Visit on 12/19/2023   Component Date Value Ref Range Status    WBC 12/19/2023 5.03  3.90 - 12.70 K/uL Final    RBC 12/19/2023 4.22  4.00 - 5.40 M/uL Final    Hemoglobin 12/19/2023 12.7  12.0 - 16.0 g/dL Final    Hematocrit 12/19/2023 37.5  37.0 - 48.5 % Final     MCV 12/19/2023 89  82 - 98 fL Final    MCH 12/19/2023 30.1  27.0 - 31.0 pg Final    MCHC 12/19/2023 33.9  32.0 - 36.0 g/dL Final    RDW 12/19/2023 11.4 (L)  11.5 - 14.5 % Final    Platelets 12/19/2023 261  150 - 450 K/uL Final    MPV 12/19/2023 9.1 (L)  9.2 - 12.9 fL Final    Immature Granulocytes 12/19/2023 0.2  0.0 - 0.5 % Final    Gran # (ANC) 12/19/2023 2.4  1.8 - 7.7 K/uL Final    Immature Grans (Abs) 12/19/2023 0.01  0.00 - 0.04 K/uL Final    Comment: Mild elevation in immature granulocytes is non specific and   can be seen in a variety of conditions including stress response,   acute inflammation, trauma and pregnancy. Correlation with other   laboratory and clinical findings is essential.      Lymph # 12/19/2023 2.1  1.0 - 4.8 K/uL Final    Mono # 12/19/2023 0.4  0.3 - 1.0 K/uL Final    Eos # 12/19/2023 0.1  0.0 - 0.5 K/uL Final    Baso # 12/19/2023 0.02  0.00 - 0.20 K/uL Final    nRBC 12/19/2023 0  0 /100 WBC Final    Gran % 12/19/2023 47.3  38.0 - 73.0 % Final    Lymph % 12/19/2023 42.5  18.0 - 48.0 % Final    Mono % 12/19/2023 7.6  4.0 - 15.0 % Final    Eosinophil % 12/19/2023 2.0  0.0 - 8.0 % Final    Basophil % 12/19/2023 0.4  0.0 - 1.9 % Final    Differential Method 12/19/2023 Automated   Final    Sodium 12/19/2023 141  136 - 145 mmol/L Final    Potassium 12/19/2023 3.6  3.5 - 5.1 mmol/L Final    Chloride 12/19/2023 106  95 - 110 mmol/L Final    CO2 12/19/2023 27  23 - 29 mmol/L Final    Glucose 12/19/2023 87  70 - 110 mg/dL Final    BUN 12/19/2023 6  6 - 20 mg/dL Final    Creatinine 12/19/2023 0.6  0.5 - 1.4 mg/dL Final    Calcium 12/19/2023 9.5  8.7 - 10.5 mg/dL Final    Total Protein 12/19/2023 8.4  6.0 - 8.4 g/dL Final    Albumin 12/19/2023 4.6  3.5 - 5.2 g/dL Final    Total Bilirubin 12/19/2023 0.5  0.1 - 1.0 mg/dL Final    Comment: For infants and newborns, interpretation of results should be based  on gestational age, weight and in agreement with clinical  observations.    Premature Infant  recommended reference ranges:  Up to 24 hours.............<8.0 mg/dL  Up to 48 hours............<12.0 mg/dL  3-5 days..................<15.0 mg/dL  6-29 days.................<15.0 mg/dL      Alkaline Phosphatase 12/19/2023 62  55 - 135 U/L Final    AST 12/19/2023 14  10 - 40 U/L Final    ALT 12/19/2023 19  10 - 44 U/L Final    eGFR 12/19/2023 >60.0  >60 mL/min/1.73 m^2 Final    Anion Gap 12/19/2023 8  8 - 16 mmol/L Final         Imaging:     CT C/A/P 5/23/23  Chest:     A 2 mm micronodule in the apical aspect of the right upper lobe is not visualized on today's examination.  No new nodules have developed.  The lungs are well aerated and clear.  No lymphadenopathy, pleural effusion, or pericardial effusion are present.  Heart size is normal.  The thoracic inlet and axillary regions are unremarkable.  Chest wall structures appear normal.     Abdomen/pelvis:     Two arterially enhancing nodules in the left hepatic lobe remain unchanged from the previous study.  On series 3 there is a 0.7 cm nodule in the medial segment of the left hepatic lobe on image 15 and a 1.4 cm nodule in the lateral segment of the left hepatic lobe on image 27.  A presumed 0.7 cm hemangioma in the lateral segment of the left hepatic lobe is unchanged on image 21.  No new hepatic nodules have developed.  The gallbladder and bile ducts are normal.  The spleen, pancreas, and adrenal glands are normal in size and appearance.  Multiple bilateral renal cysts are unchanged in size and appearance.  The aorta tapers normally.  No lymph node enlargement, ascites, or inflammatory changes are evident in the abdomen or pelvis.  There are postoperative changes of hysterectomy and bilateral oophorectomy.  The bladder is unremarkable.  A benign 0.9 cm left iliac bone island is unchanged.  Abdominal wall structures appear normal.       Assessment:       1. Malignant tumor of unknown origin    2. Abdominal pain, unspecified abdominal location    3. 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  -Molecular markers were suggestive of intestinal origin  -Patient underwent colonoscopy and EGD without pathology found  -In addition patient underwent complete hysterectomy with right salpingo oophorectomy and extensive abdominal surgery without additional disease found  -Repeat PET/CT 7/05/22 showed no residual disease  -EUS on 8/03/22 showed no disease in the esophagus, stomach, pancreas, liver or vessels  -Liver lesion seen on EUS was discussed with Dr Echols today whom stated the [id number] lesion was a hemangioma  -Pt with presumed malignancy only in the ovary and would be classified as stage 1  -Treatment with FOLFOX discussed given suggestion of GI primary; however, the patient declined chemotherapy  -The patient understood that there is a higher risk of recurrence without adjuvant treatment  -The patient was enrolled in the STRATA [place] Trial   -Analysis done on 8/26/22 of the tumor removed on 3/10/22 showed a TP53 mutation as well as variance of unknown significance with GRM8, MAGEA1, NLRP1, PLAG1, TET1  -Blood collected 8/25/22, 11/03/22,  2/06/23, 5/021/23 and 8/04/23 showing no tumor molecules detected  -Scans 5/23/23 showed no evidence of residual cancer  -Will repeat CT C/A/P     Abdominal Pain - unclear Etiology   -Will assess on CT scans     Pulmonary Nodule - stable on scans  -Will reassess  -Will monitor    Route Chart for Scheduling    Med Onc Chart Routing      Follow up with physician Other. Pt needs Ca125 added to her labs today.  Pt needs a CT C/A/P in the next 2 weeks with a return appt with me once completed.   Follow up with YADIRA    Infusion scheduling note    Injection scheduling note    Labs    Imaging    Pharmacy appointment    Other referrals                     Jameson Hou MD  [facility]  Hematology and Oncology  [facility]  [facility] [address] [facility] [place] [address]   O: [phone]  F: [phone]

## 2023-12-19 ENCOUNTER — LAB VISIT (OUTPATIENT)
Dept: LAB | Facility: HOSPITAL | Age: 33
End: 2023-12-19
Attending: INTERNAL MEDICINE
Payer: MEDICAID

## 2023-12-19 ENCOUNTER — OFFICE VISIT (OUTPATIENT)
Dept: HEMATOLOGY/ONCOLOGY | Facility: CLINIC | Age: 33
End: 2023-12-19
Payer: MEDICAID

## 2023-12-19 VITALS
RESPIRATION RATE: 16 BRPM | OXYGEN SATURATION: 98 % | BODY MASS INDEX: 23.14 KG/M2 | WEIGHT: 122.56 LBS | HEART RATE: 89 BPM | HEIGHT: 61 IN | SYSTOLIC BLOOD PRESSURE: 134 MMHG | DIASTOLIC BLOOD PRESSURE: 94 MMHG | TEMPERATURE: 98 F

## 2023-12-19 DIAGNOSIS — R10.9 ABDOMINAL PAIN, UNSPECIFIED ABDOMINAL LOCATION: ICD-10-CM

## 2023-12-19 DIAGNOSIS — C80.1: Primary | ICD-10-CM

## 2023-12-19 DIAGNOSIS — R91.1 PULMONARY NODULE: ICD-10-CM

## 2023-12-19 DIAGNOSIS — C80.1: ICD-10-CM

## 2023-12-19 LAB
ALBUMIN SERPL BCP-MCNC: 4.6 G/DL (ref 3.5–5.2)
ALP SERPL-CCNC: 62 U/L (ref 55–135)
ALT SERPL W/O P-5'-P-CCNC: 19 U/L (ref 10–44)
ANION GAP SERPL CALC-SCNC: 8 MMOL/L (ref 8–16)
AST SERPL-CCNC: 14 U/L (ref 10–40)
BASOPHILS # BLD AUTO: 0.02 K/UL (ref 0–0.2)
BASOPHILS NFR BLD: 0.4 % (ref 0–1.9)
BILIRUB SERPL-MCNC: 0.5 MG/DL (ref 0.1–1)
BUN SERPL-MCNC: 6 MG/DL (ref 6–20)
CALCIUM SERPL-MCNC: 9.5 MG/DL (ref 8.7–10.5)
CANCER AG125 SERPL-ACNC: 10 U/ML (ref 0–30)
CHLORIDE SERPL-SCNC: 106 MMOL/L (ref 95–110)
CO2 SERPL-SCNC: 27 MMOL/L (ref 23–29)
CREAT SERPL-MCNC: 0.6 MG/DL (ref 0.5–1.4)
DIFFERENTIAL METHOD: ABNORMAL
EOSINOPHIL # BLD AUTO: 0.1 K/UL (ref 0–0.5)
EOSINOPHIL NFR BLD: 2 % (ref 0–8)
ERYTHROCYTE [DISTWIDTH] IN BLOOD BY AUTOMATED COUNT: 11.4 % (ref 11.5–14.5)
EST. GFR  (NO RACE VARIABLE): >60 ML/MIN/1.73 M^2
GLUCOSE SERPL-MCNC: 87 MG/DL (ref 70–110)
HCT VFR BLD AUTO: 37.5 % (ref 37–48.5)
HGB BLD-MCNC: 12.7 G/DL (ref 12–16)
IMM GRANULOCYTES # BLD AUTO: 0.01 K/UL (ref 0–0.04)
IMM GRANULOCYTES NFR BLD AUTO: 0.2 % (ref 0–0.5)
LYMPHOCYTES # BLD AUTO: 2.1 K/UL (ref 1–4.8)
LYMPHOCYTES NFR BLD: 42.5 % (ref 18–48)
MCH RBC QN AUTO: 30.1 PG (ref 27–31)
MCHC RBC AUTO-ENTMCNC: 33.9 G/DL (ref 32–36)
MCV RBC AUTO: 89 FL (ref 82–98)
MONOCYTES # BLD AUTO: 0.4 K/UL (ref 0.3–1)
MONOCYTES NFR BLD: 7.6 % (ref 4–15)
NEUTROPHILS # BLD AUTO: 2.4 K/UL (ref 1.8–7.7)
NEUTROPHILS NFR BLD: 47.3 % (ref 38–73)
NRBC BLD-RTO: 0 /100 WBC
PLATELET # BLD AUTO: 261 K/UL (ref 150–450)
PMV BLD AUTO: 9.1 FL (ref 9.2–12.9)
POTASSIUM SERPL-SCNC: 3.6 MMOL/L (ref 3.5–5.1)
PROT SERPL-MCNC: 8.4 G/DL (ref 6–8.4)
RBC # BLD AUTO: 4.22 M/UL (ref 4–5.4)
SODIUM SERPL-SCNC: 141 MMOL/L (ref 136–145)
WBC # BLD AUTO: 5.03 K/UL (ref 3.9–12.7)

## 2023-12-19 PROCEDURE — 1159F PR MEDICATION LIST DOCUMENTED IN MEDICAL RECORD: ICD-10-PCS | Mod: CPTII,,, | Performed by: INTERNAL MEDICINE

## 2023-12-19 PROCEDURE — 99999 PR PBB SHADOW E&M-EST. PATIENT-LVL III: CPT | Mod: PBBFAC,,, | Performed by: INTERNAL MEDICINE

## 2023-12-19 PROCEDURE — 99213 OFFICE O/P EST LOW 20 MIN: CPT | Mod: PBBFAC,PN | Performed by: INTERNAL MEDICINE

## 2023-12-19 PROCEDURE — 86304 IMMUNOASSAY TUMOR CA 125: CPT | Performed by: INTERNAL MEDICINE

## 2023-12-19 PROCEDURE — 3080F DIAST BP >= 90 MM HG: CPT | Mod: CPTII,,, | Performed by: INTERNAL MEDICINE

## 2023-12-19 PROCEDURE — 99213 PR OFFICE/OUTPT VISIT, EST, LEVL III, 20-29 MIN: ICD-10-PCS | Mod: S$PBB,,, | Performed by: INTERNAL MEDICINE

## 2023-12-19 PROCEDURE — 36415 COLL VENOUS BLD VENIPUNCTURE: CPT | Mod: PN | Performed by: INTERNAL MEDICINE

## 2023-12-19 PROCEDURE — 3075F SYST BP GE 130 - 139MM HG: CPT | Mod: CPTII,,, | Performed by: INTERNAL MEDICINE

## 2023-12-19 PROCEDURE — 85025 COMPLETE CBC W/AUTO DIFF WBC: CPT | Mod: PN | Performed by: INTERNAL MEDICINE

## 2023-12-19 PROCEDURE — 80053 COMPREHEN METABOLIC PANEL: CPT | Mod: PN | Performed by: INTERNAL MEDICINE

## 2023-12-19 PROCEDURE — 1159F MED LIST DOCD IN RCRD: CPT | Mod: CPTII,,, | Performed by: INTERNAL MEDICINE

## 2023-12-19 PROCEDURE — 3075F PR MOST RECENT SYSTOLIC BLOOD PRESS GE 130-139MM HG: ICD-10-PCS | Mod: CPTII,,, | Performed by: INTERNAL MEDICINE

## 2023-12-19 PROCEDURE — 3008F PR BODY MASS INDEX (BMI) DOCUMENTED: ICD-10-PCS | Mod: CPTII,,, | Performed by: INTERNAL MEDICINE

## 2023-12-19 PROCEDURE — 99213 OFFICE O/P EST LOW 20 MIN: CPT | Mod: S$PBB,,, | Performed by: INTERNAL MEDICINE

## 2023-12-19 PROCEDURE — 3080F PR MOST RECENT DIASTOLIC BLOOD PRESSURE >= 90 MM HG: ICD-10-PCS | Mod: CPTII,,, | Performed by: INTERNAL MEDICINE

## 2023-12-19 PROCEDURE — 3008F BODY MASS INDEX DOCD: CPT | Mod: CPTII,,, | Performed by: INTERNAL MEDICINE

## 2023-12-19 PROCEDURE — 99999 PR PBB SHADOW E&M-EST. PATIENT-LVL III: ICD-10-PCS | Mod: PBBFAC,,, | Performed by: INTERNAL MEDICINE

## 2024-01-09 ENCOUNTER — HOSPITAL ENCOUNTER (OUTPATIENT)
Dept: RADIOLOGY | Facility: HOSPITAL | Age: 34
Discharge: HOME | End: 2024-01-09
Attending: INTERNAL MEDICINE
Payer: MEDICAID

## 2024-01-09 DIAGNOSIS — C80.1: ICD-10-CM

## 2024-01-09 DIAGNOSIS — R10.9 ABDOMINAL PAIN, UNSPECIFIED ABDOMINAL LOCATION: ICD-10-CM

## 2024-01-09 PROCEDURE — 25500020 PHARM REV CODE 255: Mod: PO | Performed by: INTERNAL MEDICINE

## 2024-01-09 PROCEDURE — A9698 NON-RAD CONTRAST MATERIALNOC: HCPCS | Mod: PO | Performed by: INTERNAL MEDICINE

## 2024-01-09 PROCEDURE — 74177 CT ABD & PELVIS W/CONTRAST: CPT | Mod: 26,,, | Performed by: RADIOLOGY

## 2024-01-09 PROCEDURE — 74177 CT ABD & PELVIS W/CONTRAST: CPT | Mod: TC,PO

## 2024-01-09 PROCEDURE — 71260 CT THORAX DX C+: CPT | Mod: 26,,, | Performed by: RADIOLOGY

## 2024-01-09 RX ADMIN — IOHEXOL 75 ML: 350 INJECTION, SOLUTION INTRAVENOUS at 11:01

## 2024-01-09 RX ADMIN — IOHEXOL 1000 ML: 9 SOLUTION ORAL at 11:01

## 2024-01-18 NOTE — PROGRESS NOTES
PATIENT: Francis Nagel  MRN: [id number]  DATE: 1/19/2024      Diagnosis:   1. Malignant tumor of unknown origin                  Chief Complaint: Malignant tumor of unknown origin (1 month follow up for CT results )        Oncologic History:      Oncologic History 2/04/22 US Pelvis  2/15/22 CT Abdomen and Pelvis  3/10/22 robotic salpingo oophorectomy of the left ovary  4/11/22 EGD Colonoscopy  4/21/22 PET/CT  5/13/22 robotic assisted total laparoscopic hysterectomy, right salpingo oophorectomy, bilateral pelvic and periaortic lymph node dissection, omentectomy, peritoneal biopsies, and appendectomy  6/29/22 - PET/CT  8/03/22 - EUS  11/21/22 CT C/A/P  5/23/23 CT C/A/P  1/09/24 CT C/A/P     Oncologic Treatment 3/10/22 - robotic salpingo oophorectomy of the left ovary    Pathology 3/10/22 adenocarcinoma intestinal phenotype CK7+, 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  5/13/22  no sign of malignancy and 16 negative lymph nodes      The patient initially presented to see Dr Johnson on 2/03/22 with complaint of pelvic pain.  US of the pelvis on 2/04/22 showed a 15.7 x 7.9 x 11.1 complex multi-septated cystic right ovarian mass replacing the entire right ovary.  CT of the abdomen and pelvis on 02/15/2022 showed a large complex pelvic mass centered above an left lateral to the uterus measuring 16.7 cm in greatest dimension.  The patient then saw gynecologist oncologist Dr. Cintron on 02/16/2022.  She then saw Dr. Delacruz on 02/21/2022.  The patient then underwent robotic salpingo oophorectomy of the left ovary under the care of Dr. 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  The patient then underwent EGD and colonoscopy on 04/11/2022 showing gastritis, antritis, and internal hemorrhoids.  PET-CT performed 04/21/2022 showed mildly increased uptake in the thyroid g gland bilaterally in keeping with thyroiditis; multifocal activity in the right adnexal region with maximum SUV  of 6.6 atypical for physiologic ovarian uptake in a premenopausal woman; punctate area of low-grade FDG activity localized to the left subserosal aspect of the uterus; diffusely increased uptake in the gastric cardia and body without discrete nodularity or mass.  The patient then underwent robotic assisted total laparoscopic hysterectomy, right salpingo oophorectomy, bilateral pelvic and periaortic lymph node dissection, omentectomy, peritoneal biopsies, and appendectomy under the care of Dr. Delacruz on 05/13/2022 with pathology showing no sign of malignancy and 16 negative lymph nodes.  The patient was then admitted from [address] [place] [address] for postoperative pelvic abscess with placement of peritoneal drain.  Cultures grew out Klebsiella with sensitivities to Bactrim.  Patient was discussed at tumor Board on 06/08/2022 with recommendations to check CEA and CA 19 9, obtained MRI abdomen and pelvis with MRCP and hold adjuvant chemotherapy pending further workup.  The patient underwent MRCP on 06/22/2022 showing 11 mm subcapsular mass in segment 3 of the liver concerning for benign cavernous hemangioma; similar lesion in segment 4A measuring 6 mm; stable with respect of prior scan on 02/15/2022.  MRCP sequence a demonstrated normal hepatic biliary structures with normal pancreatic duct.   The patient underwent PET-CT on 06/29/2022 showing 17 x 14 mm fluid density along the right pelvic sidewall likely residual fluid from postop abscess consistent with small seroma or lymphocele and no evidence of recurrence or metastatic disease.   The patient underwent EUS with Dr Echols on 8/03/22 showing a lesion in the left lobe of the liver measuring 61m72ql and normal appearance of the pancreas, gallbladder, esophagus, stomach, duodenum and vessels.   The patient underwent initiation into the STRATA [place] trial.  Tumor assessed on 8/26/22 showed a TP53 mutation as well as variance of unknown significance with GRM8, MAGEA1, NLRP1, PLAG1,  TET1.  Blood collected 8/25/22 showed no tumor molecules detected.   The patient underwent CT of the chest, abdomen, and pelvis on 11/21/2022 showing a 2 mm nodule in the apical segment of the right upper lobe; small arterially enhancing lesions within the left hepatic lobe previously characterized as hemangiomas; hypodense lesion in the left hepatic lobe characterized as hemangioma; and stable appearing nonspecific 0.9 cm rounded sclerotic focus in the left iliac bone.  Blood collected on 11/03/2022 for the sentinel trial showed no circulating tumor DNA.   The patient had circulating tumor DNA checked via the sentinel trial on 5/01/23 with no circulating tumor DNA found.  PT underwent CT C/A/P on 5/23/23 showing a 2 mm micronodule in the apical aspect of the right upper lobe; 2 arterial enhancing lesions in the left hepatic lobe which are stable and a 0.7 cm hemangioma in the lateral aspect of the left hepatic lobe.   The patient had blood work done on 08/04/2023 with a sentinel trial which was negative for circulating tumor DNA  Subjective:    Interval History:  Ms. Nagel is a 33 y.o. female with anemia who presents for work up of metastatic cancer.  Since the last clinic visit the patient underwent CT of the chest, abdomen, and pelvis on 01/09/2024 showing no evidence of disease.  The patient denies CP, cough, SOB, abdominal pain, nausea, vomiting, constipation, diarrhea.  The patient denies fever, chills, night sweats, weight loss, new lumps or bumps, easy bruising or bleeding.    Past Medical History:   Past Medical History:   Diagnosis Date    Anemia 03/11/2022    Encounter for blood transfusion 03/11/2022    Mass of left ovary 03/10/2022    JOSE MIGUEL.     LEFT OVARY SHOWS ADENOCARCINOMA, INTESTINAL PHENOTYPE MEASURING 15 CM    Metastatic cancer 4/11/2022    PONV (postoperative nausea and vomiting)     S/P unilateral salpingo-oophorectomy/mass resection 03/10/2022       Past Surgical HIstory:   Past Surgical  History:   Procedure Laterality Date    COLONOSCOPY N/A 4/11/2022    Procedure: COLONOSCOPY;  Surgeon: Eric Meléndez Jr., MD;  Location: [facility];  Service: Endoscopy;  Laterality: N/A;    ENDOSCOPIC ULTRASOUND OF UPPER GASTROINTESTINAL TRACT Left 8/3/2022    Procedure: ULTRASOUND, UPPER GI TRACT, ENDOSCOPIC;  Surgeon: Richar Echols MD;  Location: [facility];  Service: Endoscopy;  Laterality: Left;    ESOPHAGOGASTRODUODENOSCOPY N/A 4/11/2022    Procedure: EGD (ESOPHAGOGASTRODUODENOSCOPY);  Surgeon: Eric Meléndez Jr., MD;  Location: [facility];  Service: Endoscopy;  Laterality: N/A;    ESOPHAGOGASTRODUODENOSCOPY N/A 8/3/2022    Procedure: EGD (ESOPHAGOGASTRODUODENOSCOPY);  Surgeon: Richar Echols MD;  Location: [facility];  Service: Endoscopy;  Laterality: N/A;    ROBOT-ASSISTED APPENDECTOMY  5/13/2022    Procedure: ROBOTIC APPENDECTOMY;  Surgeon: Charmaine Delacruz MD;  Location: [facility];  Service: OB/GYN;;    ROBOT-ASSISTED LAPAROSCOPIC ABDOMINAL HYSTERECTOMY USING DA DARVIN XI N/A 5/13/2022    Procedure: XI ROBOTIC HYSTERECTOMY;  Surgeon: Charmaine Delacruz MD;  Location: [facility];  Service: OB/GYN;  Laterality: N/A;    ROBOT-ASSISTED LAPAROSCOPIC OMENTECTOMY USING DA DARVIN XI N/A 5/13/2022    Procedure: XI ROBOTIC OMENTECTOMY;  Surgeon: Charmaine Delacruz MD;  Location: [facility];  Service: OB/GYN;  Laterality: N/A;    ROBOT-ASSISTED LAPAROSCOPIC SALPINGO-OOPHORECTOMY USING DA DARVIN XI Left 3/10/2022    Procedure: XI ROBOTIC SALPINGO-OOPHORECTOMY/ USO;  Surgeon: Charmaine Delacruz MD;  Location: [address];  Service: OB/GYN;  Laterality: Left;    ROBOT-ASSISTED LAPAROSCOPIC SURGICAL REMOVAL OF OVARY USING DA DARVIN XI Right 5/13/2022    Procedure: XI ROBOTIC OOPHORECTOMY;  Surgeon: Charmaine Delacruz MD;  Location: [facility];  Service: OB/GYN;  Laterality: Right;    ROBOT-ASSISTED SURGICAL REMOVAL OF FALLOPIAN TUBE USING DA DARVIN XI Bilateral 5/13/2022    Procedure: XI ROBOTIC SALPINGECTOMY;  Surgeon:  "Charmaine Delacruz MD;  Location: [facility];  Service: OB/GYN;  Laterality: Bilateral;       Family History:   Family History   Problem Relation Age of Onset    Breast cancer Maternal Aunt     Lung cancer Paternal Uncle     Colon cancer Neg Hx     Ovarian cancer Neg Hx        Social History:  reports that she has never smoked. She has never used smokeless tobacco. She reports current alcohol use. She reports that she does not use drugs.    Allergies:  Review of patient's allergies indicates:  No Known Allergies    Medications:  Current Outpatient Medications   Medication Sig Dispense Refill    cetirizine (ZYRTEC) 10 MG tablet Take by mouth once daily.       No current facility-administered medications for this visit.       Review of Systems   Constitutional:  Negative for chills, fatigue, fever and unexpected weight change.   Respiratory:  Negative for cough and shortness of breath.    Cardiovascular:  Negative for chest pain and palpitations.   Gastrointestinal:  Negative for abdominal pain, constipation, diarrhea, nausea and vomiting.   Skin:  Negative for rash.   Neurological:  Negative for headaches.   Hematological:  Negative for adenopathy. Does not bruise/bleed easily.       ECOG Performance Status: 0   Objective:      Vitals:   Vitals:    01/19/24 1135   BP: 110/68   BP Location: Left arm   Patient Position: Sitting   BP Method: Medium (Manual)   Pulse: 84   Resp: 12   Temp: 97.6 °F (36.4 °C)   TempSrc: Temporal   SpO2: 98%   Weight: 56 kg (123 lb 7.3 oz)   Height: 5' 1" (1.549 m)               Physical Exam  Constitutional:       General: She is not in acute distress.     Appearance: She is well-developed. She is not diaphoretic.   HENT:      Head: Normocephalic and atraumatic.   Cardiovascular:      Rate and Rhythm: Normal rate and regular rhythm.      Heart sounds: Normal heart sounds. No murmur heard.     No friction rub. No gallop.   Pulmonary:      Effort: Pulmonary effort is normal. No respiratory " distress.      Breath sounds: Normal breath sounds. No wheezing or rales.   Chest:      Chest wall: No tenderness.   Abdominal:      General: Bowel sounds are normal. There is no distension.      Palpations: Abdomen is soft. There is no mass.      Tenderness: There is no abdominal tenderness. There is no guarding or rebound.   Lymphadenopathy:      Cervical: No cervical adenopathy.      Upper Body:      Right upper body: No supraclavicular or axillary adenopathy.      Left upper body: No supraclavicular or axillary adenopathy.   Skin:     Findings: No erythema or rash.   Neurological:      Mental Status: She is alert and oriented to person, place, and time.   Psychiatric:         Behavior: Behavior normal.         Laboratory Data:  No visits with results within 1 Week(s) from this visit.   Latest known visit with results is:   Lab Visit on 12/19/2023   Component Date Value Ref Range Status    WBC 12/19/2023 5.59  3.90 - 12.70 K/uL Final    RBC 12/19/2023 4.25  4.00 - 5.40 M/uL Final    Hemoglobin 12/19/2023 12.7  12.0 - 16.0 g/dL Final    Hematocrit 12/19/2023 37.8  37.0 - 48.5 % Final    MCV 12/19/2023 89  82 - 98 fL Final    MCH 12/19/2023 29.9  27.0 - 31.0 pg Final    MCHC 12/19/2023 33.6  32.0 - 36.0 g/dL Final    RDW 12/19/2023 11.5  11.5 - 14.5 % Final    Platelets 12/19/2023 271  150 - 450 K/uL Final    MPV 12/19/2023 9.1 (L)  9.2 - 12.9 fL Final    Immature Granulocytes 12/19/2023 0.2  0.0 - 0.5 % Final    Gran # (ANC) 12/19/2023 2.6  1.8 - 7.7 K/uL Final    Immature Grans (Abs) 12/19/2023 0.01  0.00 - 0.04 K/uL Final    Comment: Mild elevation in immature granulocytes is non specific and   can be seen in a variety of conditions including stress response,   acute inflammation, trauma and pregnancy. Correlation with other   laboratory and clinical findings is essential.      Lymph # 12/19/2023 2.4  1.0 - 4.8 K/uL Final    Mono # 12/19/2023 0.4  0.3 - 1.0 K/uL Final    Eos # 12/19/2023 0.2  0.0 - 0.5 K/uL  Final    Baso # 12/19/2023 0.01  0.00 - 0.20 K/uL Final    nRBC 12/19/2023 0  0 /100 WBC Final    Gran % 12/19/2023 46.3  38.0 - 73.0 % Final    Lymph % 12/19/2023 43.1  18.0 - 48.0 % Final    Mono % 12/19/2023 7.5  4.0 - 15.0 % Final    Eosinophil % 12/19/2023 2.7  0.0 - 8.0 % Final    Basophil % 12/19/2023 0.2  0.0 - 1.9 % Final    Differential Method 12/19/2023 Automated   Final    Sodium 12/19/2023 141  136 - 145 mmol/L Final    Potassium 12/19/2023 3.4 (L)  3.5 - 5.1 mmol/L Final    Chloride 12/19/2023 106  95 - 110 mmol/L Final    CO2 12/19/2023 27  23 - 29 mmol/L Final    Glucose 12/19/2023 89  70 - 110 mg/dL Final    BUN 12/19/2023 7  6 - 20 mg/dL Final    Creatinine 12/19/2023 0.6  0.5 - 1.4 mg/dL Final    Calcium 12/19/2023 9.6  8.7 - 10.5 mg/dL Final    Total Protein 12/19/2023 8.6 (H)  6.0 - 8.4 g/dL Final    Albumin 12/19/2023 4.6  3.5 - 5.2 g/dL Final    Total Bilirubin 12/19/2023 0.6  0.1 - 1.0 mg/dL Final    Comment: For infants and newborns, interpretation of results should be based  on gestational age, weight and in agreement with clinical  observations.    Premature Infant recommended reference ranges:  Up to 24 hours.............<8.0 mg/dL  Up to 48 hours............<12.0 mg/dL  3-5 days..................<15.0 mg/dL  6-29 days.................<15.0 mg/dL      Alkaline Phosphatase 12/19/2023 63  55 - 135 U/L Final    AST 12/19/2023 14  10 - 40 U/L Final    ALT 12/19/2023 19  10 - 44 U/L Final    eGFR 12/19/2023 >60.0  >60 mL/min/1.73 m^2 Final    Anion Gap 12/19/2023 8  8 - 16 mmol/L Final         Imaging:     CT C/A/P 1/09/24  Chest:     Soft tissues of the base of the neck:Unremarkable.     Heart and great vessels: The heart is normal in size without a significant volume of pericardial fluid. No thoracic aortic aneurysm.     Lymph nodes: No pathologically enlarged lymph nodes in the chest or axilla.     Airways: Unremarkable.     Lungs: No evidence of airspace consolidation or pulmonary mass. No  emphysematous lung architecture. No bronchiectasis. No new or concerning pulmonary nodules.     Pleura: No significant volume of pleural fluid or pneumothorax.     Chest wall: No significant abnormalities appreciated.     Bones: There is degenerative change of the thoracic spine. No acute thoracic compression fracture.     Abdomen and pelvis:     Liver: The liver is normal in size and attenuation. Redemonstrated probable flash filling hemangioma within the medial aspect of the left hepatic lobe near the dome which is subcentimeter in size as well as a more conventional appearing likely hemangioma within the lateral aspect of the left hepatic lobe measuring 1.8 cm (series 3, image 26). No new liver lesions identified. The portal vein is patent. No biliary dilatation.     Gallbladder: No significant abnormalities appreciated     Pancreas: The pancreas shows no evidence of abnormal inflammation, pancreatic ductal dilatation, or solid/cystic mass.     Stomach and duodenum: No significant abnormalities appreciated.     Spleen: Normal in size and attenuation without focal mass.     Adrenals: No significant abnormality appreciated.     Aorta: No abdominal aortic aneurysm.     Mesentery and abdominal lymph nodes: No pathologically enlarged periaortic or retroperitoneal lymph nodes. No mesenteric lymphadenopathy.     Kidneys and ureters: The kidneys show no evidence of stones, hydronephrosis, or solid enhancing mass.Similar scattered hypodensities within each kidney most compatible with small cysts grossly unchanged. The ureters are normal in caliber and course without definite stones appreciated.     Bowel: No bowel inflammatory change or high-grade bowel obstruction. No intra-abdominal abscess.     Appendix: No evidence of appendicitis.     Peritoneum: No ascites. No peritoneal soft tissue nodule. No pneumoperitoneum.     Bladder: No significant abnormalities appreciated.     Pelvic lymph nodes: No pathologically enlarged  iliac chain, inguinal, or pelvic sidewall lymph nodes.     Reproductive: Status post total hysterectomy. Stable appearance of the surgical bed without evidence of any local recurrent disease on the basis of CT imaging.     Miscellaneous soft tissue findings:None.     Bones: No acute process or bony destructive changes. No significant degenerative changes incidental left iliac bone island redemonstrated.       Assessment:       1. 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  -Molecular markers were suggestive of intestinal origin  -Patient underwent colonoscopy and EGD without pathology found  -In addition patient underwent complete hysterectomy with right salpingo oophorectomy and extensive abdominal surgery without additional disease found  -Repeat PET/CT 7/05/22 showed no residual disease  -EUS on 8/03/22 showed no disease in the esophagus, stomach, pancreas, liver or vessels  -Liver lesion seen on EUS was discussed with Dr Echols today whom stated the [id number] lesion was a hemangioma  -Pt with presumed malignancy only in the ovary and would be classified as stage 1  -Treatment with FOLFOX discussed given suggestion of GI primary; however, the patient declined chemotherapy  -The patient understood that there is a higher risk of recurrence without adjuvant treatment  -The patient was enrolled in the STRATA [place] Trial   -Analysis done on 8/26/22 of the tumor removed on 3/10/22 showed a TP53 mutation as well as variance of unknown significance with GRM8, MAGEA1, NLRP1, PLAG1, TET1  -Blood collected 8/25/22, 11/03/22,  2/06/23, 5/021/23 and 8/04/23 showing no tumor molecules detected  -Scans 5/23/23 and 1/09/24 showed no evidence of residual cancer  -Pt to return in 3 months with labs and exam  -Will repeat scans in 6 months    Route Chart for Scheduling    Med Onc Chart Routing      Follow up  with physician 3 months. The patient needs a CBC and CMP in 3 months with a return appt.   Follow up with YADIRA    Infusion scheduling note    Injection scheduling note    Labs    Imaging    Pharmacy appointment    Other referrals                   Jameson Hou MD  [facility]  Hematology and Oncology  [facility]   [address]   [place] [address]   O: [phone]  F: [phone]

## 2024-01-19 ENCOUNTER — OFFICE VISIT (OUTPATIENT)
Dept: HEMATOLOGY/ONCOLOGY | Facility: CLINIC | Age: 34
End: 2024-01-19
Payer: MEDICAID

## 2024-01-19 VITALS
HEIGHT: 61 IN | DIASTOLIC BLOOD PRESSURE: 68 MMHG | OXYGEN SATURATION: 98 % | RESPIRATION RATE: 12 BRPM | BODY MASS INDEX: 23.3 KG/M2 | HEART RATE: 84 BPM | SYSTOLIC BLOOD PRESSURE: 110 MMHG | TEMPERATURE: 98 F | WEIGHT: 123.44 LBS

## 2024-01-19 DIAGNOSIS — C80.1: Primary | ICD-10-CM

## 2024-01-19 PROCEDURE — 3008F BODY MASS INDEX DOCD: CPT | Mod: CPTII,,, | Performed by: INTERNAL MEDICINE

## 2024-01-19 PROCEDURE — 1159F MED LIST DOCD IN RCRD: CPT | Mod: CPTII,,, | Performed by: INTERNAL MEDICINE

## 2024-01-19 PROCEDURE — 99999 PR PBB SHADOW E&M-EST. PATIENT-LVL III: CPT | Mod: PBBFAC,,, | Performed by: INTERNAL MEDICINE

## 2024-01-19 PROCEDURE — 3074F SYST BP LT 130 MM HG: CPT | Mod: CPTII,,, | Performed by: INTERNAL MEDICINE

## 2024-01-19 PROCEDURE — 99213 OFFICE O/P EST LOW 20 MIN: CPT | Mod: S$PBB,,, | Performed by: INTERNAL MEDICINE

## 2024-01-19 PROCEDURE — 3078F DIAST BP <80 MM HG: CPT | Mod: CPTII,,, | Performed by: INTERNAL MEDICINE

## 2024-01-19 PROCEDURE — 99213 OFFICE O/P EST LOW 20 MIN: CPT | Mod: PBBFAC,PN | Performed by: INTERNAL MEDICINE

## (undated) DEVICE — SUT PROLENE 0 MO6 30IN BLUE

## (undated) DEVICE — PAD PINK TRENDELENBURG POS XL

## (undated) DEVICE — SUT PROLENE 0 CT1 30IN BLUE

## (undated) DEVICE — SET TRI-LUMEN FILTERED TUBE

## (undated) DEVICE — COVER TIP CURVED SCISSORS XI

## (undated) DEVICE — NDL INSUF ULTRA VERESS 120MM

## (undated) DEVICE — NDL INSUFFLATION VERRES 120MM

## (undated) DEVICE — LUBRICANT SURGILUBE 2 OZ

## (undated) DEVICE — IRRIGATOR ENDOSCOPY DISP.

## (undated) DEVICE — JELLY SURGILUBE 5GR

## (undated) DEVICE — MANIPULATOR VCARE PLUS 34MM

## (undated) DEVICE — ELECTRODE REM PLYHSV RETURN 9

## (undated) DEVICE — DEVICE ANC SW STAT FOLEY 6-24

## (undated) DEVICE — LEGGINGS 48X31 INCH

## (undated) DEVICE — SEE MEDLINE ITEM 157148

## (undated) DEVICE — GLOVE BIOGEL SKINSENSE PI 6.5

## (undated) DEVICE — SEAL UNIVERSAL 5MM-8MM XI

## (undated) DEVICE — DRAPE SCOPE PILLOW WARMER

## (undated) DEVICE — SOL BETADINE 5%

## (undated) DEVICE — COVER LIGHT HANDLE

## (undated) DEVICE — SOL NS 1000CC

## (undated) DEVICE — KIT WING PAD POSITIONING

## (undated) DEVICE — SUT V-LOC ABSRB WOUND CLSR

## (undated) DEVICE — INSERT CUSHIONPRONE VIEW LARGE

## (undated) DEVICE — PORT ACCESS 8MM W/120MM LOW

## (undated) DEVICE — DRAPE COLUMN DAVINCI XI

## (undated) DEVICE — SEE MEDLINE ITEM 157181

## (undated) DEVICE — PORT ACCESS 5MM W/100MM

## (undated) DEVICE — SOL PVP-I SCRUB 7.5% 4OZ

## (undated) DEVICE — SOL ELECTROLUBE ANTI-STIC

## (undated) DEVICE — BLADE SURG CARBON STEEL SZ11

## (undated) DEVICE — SOL WATER STRL IRR 1000ML

## (undated) DEVICE — SEE MEDLINE ITEM 154981

## (undated) DEVICE — TRAY FOLEY 16FR INFECTION CONT

## (undated) DEVICE — SUT MCRYL PLUS 4-0 PS2 27IN

## (undated) DEVICE — SEE MEDLINE ITEM 156923

## (undated) DEVICE — DRAPE ARM DAVINCI XI

## (undated) DEVICE — BAG INZII TISS RETRV 12/15MM

## (undated) DEVICE — OBTURATOR BLADELESS 8MM XI

## (undated) DEVICE — GOWN SURGICAL X-LARGE

## (undated) DEVICE — ADHESIVE DERMABOND ADVANCED

## (undated) DEVICE — KIT ANTIFOG W/SPONG & FLUID

## (undated) DEVICE — COVER LIGHT HANDLE 80/CA

## (undated) DEVICE — SYR 50CC LL

## (undated) DEVICE — OBTURATOR BLADELESS 8MM XI CLR

## (undated) DEVICE — SCISSOR 5MMX35CM DIRECT DRIVE

## (undated) DEVICE — TRAY MINOR GEN SURG

## (undated) DEVICE — SEALER VESSEL EXTEND

## (undated) DEVICE — SYS SEE SHARP SCOPE ANTIFOG

## (undated) DEVICE — SYR 10CC LUER LOCK